# Patient Record
Sex: MALE | Race: WHITE | NOT HISPANIC OR LATINO | Employment: OTHER | URBAN - METROPOLITAN AREA
[De-identification: names, ages, dates, MRNs, and addresses within clinical notes are randomized per-mention and may not be internally consistent; named-entity substitution may affect disease eponyms.]

---

## 2021-05-25 ENCOUNTER — RECORDS - HEALTHEAST (OUTPATIENT)
Dept: LAB | Facility: CLINIC | Age: 81
End: 2021-05-25

## 2021-05-25 LAB
CHOLEST SERPL-MCNC: 146 MG/DL
FASTING STATUS PATIENT QL REPORTED: ABNORMAL
HDLC SERPL-MCNC: 38 MG/DL
LDLC SERPL CALC-MCNC: 77 MG/DL
TRIGL SERPL-MCNC: 157 MG/DL

## 2021-06-10 ENCOUNTER — RECORDS - HEALTHEAST (OUTPATIENT)
Dept: ADMINISTRATIVE | Facility: OTHER | Age: 81
End: 2021-06-10

## 2021-06-10 LAB
LAB AP CHARGES (HE HISTORICAL CONVERSION): NORMAL
PATH REPORT.COMMENTS IMP SPEC: NORMAL
PATH REPORT.FINAL DX SPEC: NORMAL
PATH REPORT.GROSS SPEC: NORMAL
PATH REPORT.MICROSCOPIC SPEC OTHER STN: NORMAL
PATH REPORT.RELEVANT HX SPEC: NORMAL
RESULT FLAG (HE HISTORICAL CONVERSION): NORMAL

## 2022-07-28 ENCOUNTER — LAB REQUISITION (OUTPATIENT)
Dept: LAB | Facility: CLINIC | Age: 82
End: 2022-07-28
Payer: MEDICARE

## 2022-07-28 DIAGNOSIS — U07.1 COVID-19: ICD-10-CM

## 2022-07-28 LAB
ANION GAP SERPL CALCULATED.3IONS-SCNC: 3 MMOL/L (ref 7–15)
BUN SERPL-MCNC: 16.9 MG/DL (ref 8–23)
CALCIUM SERPL-MCNC: 8.4 MG/DL (ref 8.8–10.2)
CHLORIDE SERPL-SCNC: 104 MMOL/L (ref 98–107)
CREAT SERPL-MCNC: 1.01 MG/DL (ref 0.67–1.17)
DEPRECATED HCO3 PLAS-SCNC: 28 MMOL/L (ref 22–29)
GFR SERPL CREATININE-BSD FRML MDRD: 74 ML/MIN/1.73M2
GLUCOSE SERPL-MCNC: 98 MG/DL (ref 70–99)
POTASSIUM SERPL-SCNC: 3.6 MMOL/L (ref 3.4–5.3)
SODIUM SERPL-SCNC: 135 MMOL/L (ref 136–145)

## 2022-07-28 PROCEDURE — 80048 BASIC METABOLIC PNL TOTAL CA: CPT | Mod: ORL | Performed by: FAMILY MEDICINE

## 2024-01-03 ENCOUNTER — LAB REQUISITION (OUTPATIENT)
Dept: LAB | Facility: CLINIC | Age: 84
End: 2024-01-03

## 2024-01-03 ENCOUNTER — TRANSFERRED RECORDS (OUTPATIENT)
Dept: HEALTH INFORMATION MANAGEMENT | Facility: CLINIC | Age: 84
End: 2024-01-03
Payer: COMMERCIAL

## 2024-01-03 DIAGNOSIS — Z00.00 ENCOUNTER FOR GENERAL ADULT MEDICAL EXAMINATION WITHOUT ABNORMAL FINDINGS: ICD-10-CM

## 2024-01-03 LAB
ALBUMIN SERPL BCG-MCNC: 3.9 G/DL (ref 3.5–5.2)
ALP SERPL-CCNC: 89 U/L (ref 40–150)
ALT SERPL W P-5'-P-CCNC: 11 U/L (ref 0–70)
ANION GAP SERPL CALCULATED.3IONS-SCNC: 8 MMOL/L (ref 7–15)
AST SERPL W P-5'-P-CCNC: 15 U/L (ref 0–45)
BILIRUB SERPL-MCNC: 1 MG/DL
BUN SERPL-MCNC: 13.6 MG/DL (ref 8–23)
CALCIUM SERPL-MCNC: 9.1 MG/DL (ref 8.8–10.2)
CHLORIDE SERPL-SCNC: 104 MMOL/L (ref 98–107)
CHOLEST SERPL-MCNC: 124 MG/DL
CREAT SERPL-MCNC: 1.08 MG/DL (ref 0.67–1.17)
DEPRECATED HCO3 PLAS-SCNC: 27 MMOL/L (ref 22–29)
EGFRCR SERPLBLD CKD-EPI 2021: 68 ML/MIN/1.73M2
FASTING STATUS PATIENT QL REPORTED: NORMAL
GLUCOSE SERPL-MCNC: 101 MG/DL (ref 70–99)
HDLC SERPL-MCNC: 44 MG/DL
LDLC SERPL CALC-MCNC: 67 MG/DL
NONHDLC SERPL-MCNC: 80 MG/DL
POTASSIUM SERPL-SCNC: 4.3 MMOL/L (ref 3.4–5.3)
PROT SERPL-MCNC: 6.8 G/DL (ref 6.4–8.3)
SODIUM SERPL-SCNC: 139 MMOL/L (ref 135–145)
TRIGL SERPL-MCNC: 66 MG/DL

## 2024-01-03 PROCEDURE — 80061 LIPID PANEL: CPT | Performed by: FAMILY MEDICINE

## 2024-01-03 PROCEDURE — 80053 COMPREHEN METABOLIC PANEL: CPT | Performed by: FAMILY MEDICINE

## 2024-01-08 ENCOUNTER — HOSPITAL ENCOUNTER (OUTPATIENT)
Dept: CARDIOLOGY | Facility: CLINIC | Age: 84
Discharge: HOME | End: 2024-01-08
Attending: FAMILY MEDICINE | Admitting: FAMILY MEDICINE
Payer: COMMERCIAL

## 2024-01-08 VITALS — HEART RATE: 110 BPM | SYSTOLIC BLOOD PRESSURE: 146 MMHG | DIASTOLIC BLOOD PRESSURE: 92 MMHG

## 2024-01-08 DIAGNOSIS — R06.09 DYSPNEA ON EXERTION: ICD-10-CM

## 2024-01-08 LAB — LVEF ECHO: NORMAL

## 2024-01-08 PROCEDURE — 93306 TTE W/DOPPLER COMPLETE: CPT | Mod: 26 | Performed by: INTERNAL MEDICINE

## 2024-01-08 PROCEDURE — 93306 TTE W/DOPPLER COMPLETE: CPT

## 2024-01-29 ENCOUNTER — TRANSFERRED RECORDS (OUTPATIENT)
Dept: HEALTH INFORMATION MANAGEMENT | Facility: CLINIC | Age: 84
End: 2024-01-29
Payer: COMMERCIAL

## 2024-01-29 ENCOUNTER — MEDICAL CORRESPONDENCE (OUTPATIENT)
Dept: HEALTH INFORMATION MANAGEMENT | Facility: CLINIC | Age: 84
End: 2024-01-29
Payer: COMMERCIAL

## 2024-03-03 ENCOUNTER — HEALTH MAINTENANCE LETTER (OUTPATIENT)
Age: 84
End: 2024-03-03

## 2024-03-06 ENCOUNTER — OFFICE VISIT (OUTPATIENT)
Dept: CARDIOLOGY | Facility: CLINIC | Age: 84
End: 2024-03-06
Payer: COMMERCIAL

## 2024-03-06 VITALS
DIASTOLIC BLOOD PRESSURE: 78 MMHG | HEART RATE: 68 BPM | HEIGHT: 72 IN | BODY MASS INDEX: 36.01 KG/M2 | SYSTOLIC BLOOD PRESSURE: 130 MMHG | RESPIRATION RATE: 16 BRPM | WEIGHT: 265.9 LBS | OXYGEN SATURATION: 98 %

## 2024-03-06 DIAGNOSIS — I50.21 ACUTE SYSTOLIC CONGESTIVE HEART FAILURE (H): ICD-10-CM

## 2024-03-06 DIAGNOSIS — R00.0 TACHYCARDIA INDUCED CARDIOMYOPATHY (H): ICD-10-CM

## 2024-03-06 DIAGNOSIS — E66.01 MORBID OBESITY (H): ICD-10-CM

## 2024-03-06 DIAGNOSIS — I43 TACHYCARDIA INDUCED CARDIOMYOPATHY (H): ICD-10-CM

## 2024-03-06 DIAGNOSIS — I48.19 PERSISTENT ATRIAL FIBRILLATION (H): Primary | ICD-10-CM

## 2024-03-06 PROBLEM — I42.8 TACHYCARDIA INDUCED CARDIOMYOPATHY (H): Status: ACTIVE | Noted: 2024-03-06

## 2024-03-06 PROCEDURE — 93000 ELECTROCARDIOGRAM COMPLETE: CPT | Performed by: INTERNAL MEDICINE

## 2024-03-06 PROCEDURE — 99417 PROLNG OP E/M EACH 15 MIN: CPT | Performed by: INTERNAL MEDICINE

## 2024-03-06 PROCEDURE — 99205 OFFICE O/P NEW HI 60 MIN: CPT | Performed by: INTERNAL MEDICINE

## 2024-03-06 RX ORDER — FUROSEMIDE 20 MG
10 TABLET ORAL DAILY
Qty: 30 TABLET | Refills: 11 | Status: SHIPPED | OUTPATIENT
Start: 2024-03-06 | End: 2024-05-08

## 2024-03-06 RX ORDER — SPIRONOLACTONE 25 MG/1
12.5 TABLET ORAL DAILY
Qty: 30 TABLET | Refills: 5 | Status: SHIPPED | OUTPATIENT
Start: 2024-03-06 | End: 2024-05-08

## 2024-03-06 RX ORDER — METOPROLOL SUCCINATE 25 MG/1
25 TABLET, EXTENDED RELEASE ORAL DAILY
Qty: 30 TABLET | Refills: 11 | Status: SHIPPED | OUTPATIENT
Start: 2024-03-06 | End: 2024-03-29

## 2024-03-06 RX ORDER — LISINOPRIL 5 MG/1
5 TABLET ORAL DAILY
Qty: 30 TABLET | Refills: 11 | Status: SHIPPED | OUTPATIENT
Start: 2024-03-06 | End: 2024-05-08

## 2024-03-06 NOTE — LETTER
3/6/2024    Ravin Dockery MD  [address] [place] [address]    RE: Ravin Swanson       Dear Colleague,     I had the pleasure of seeing Ravin Swanson in the [facility].      Click to link to [facility] HEART CARE NOTE    Thank you, Dr. Dockery, for asking me to see Ravin Castrot in consultation at [facility] to evaluate abnormal echo.      Assessment/Plan:   Newly diagnosed atrial fibrillation with RVR: Patient has no obvious palpitations.  He developed a shortness of breath on exertion since November after he had COVID infection.  The patient is overweight.  He never had a sleep study.  Most likely he has obstructive with sleep apnea.  His ECG showed atrial fibrillation with RVR.  Echocardiogram was reported normal left ventricular size with moderate LVH, LVEF 40 to 45%, global hypokinesis, moderate right ventricular systolic dysfunction, severely dilated left atrium.  Based on echo ECG findings, most likely the patient had atrial fibrillation for a while.  We discussed the evaluation and management.  After discussion, start metoprolol succinate 50 mg daily for rate control. His [id number] score 3 due to age, CHF.  We discussed anticoagulation.  After discussion Xarelto 20 mg is started at suppertime for prevention of stroke.  Cardiomyopathy, LVEF of 40 to 45%, acute systolic congestive heart failure: Most likely, his cardiomyopathy was induced by tachycardia.  Discussed the management of atrial fibrillation and CHF.  Started metoprolol succinate 50 mg daily as mentioned above, start lisinopril 5 mg daily, spironolactone 12.5 mg daily, start Lasix 10 mg daily.  Follow-up with heart failure clinic in 2 weeks with routine labs.  Morbid obesity: He has lost 20 pounds over last a year.  Continue lifestyle modification.  Refer the patient to sleep medicine for evaluation of obstructive sleep apnea.    The patient is scheduled to  follow-up with the heart failure clinic in 2 weeks to reevaluate the management of atrial fibrillation, congestive heart failure and have routine labs including BMP.      Total 75 minutes were spent in this visit for face to face visit, physical exam, review of current labs/imaging studies, plan for ongoing treatment with >50% spent on counseling and coordination of care as documented in the above mentioned note.      Thank you for the opportunity to be involved in the care of Ravin Swanson. If you have any questions, please feel free to contact me.  I will see the patient again in 3 months and as needed    Much or all of the text in this note was generated through the use of Dragon Dictate voice-to-text software. Errors in spelling or words which seem out of context are unintentional.   Sound alike errors, in particular, may have escaped editing.       History of Present Illness:   It is my pleasure to see Ravin Swanson at the [facility] clinic for evaluation of abnormal ECHO. Ravin Swanson is a 83 year old male with a medical history of morbid obesity, history of prostate cancer status post surgical treatment.    The patient states that he was doing well until November 2023.  She got the COVID infection at that time.  After COVID infection, he developed shortness of breath on exertion.  He developed very mild leg edema.  He denies chest pain, shortness of breath at rest.  No exertional chest pain.  He has occasional rapid heartbeat.  He had no dizziness, lightheadedness, orthopnea, PND.  He lost 20 pounds over last a year.  His blood pressure and heart rate in normal range this morning.    Twelve-lead ECG in clinic today showed    Echocardiogram on January 8, 2024 which was reported normal left ventricular size, moderate LVH, mild to moderate global hypokinesis with estimated left ventricle ejection fraction 40 to 45%, moderately decreased right ventricular systolic function, left atrium  severely enlarged, mildly elevated pulmonary artery systolic pressure.  Ascending aorta 4.0 cm.  The patient was in atrial fibrillation during echo scan.    Past Medical History:     Patient Active Problem List   Diagnosis    Herpes Zoster (Shingles)       Past Surgical History:   No past surgical history on file.    Family History:   Reviewed: His brother has some kind of heart disease.    Social History:    reports that he quit smoking about 38 years ago. His smoking use included cigarettes. He has never used smokeless tobacco. He reports current alcohol use. He reports that he does not use drugs.    Review of Systems:   12 systems are reviewed negative except for in HPI.    Meds:   No current outpatient medications on file.     Allergies:   Patient has no known allergies.    Objective:      Physical Exam  120.6 kg (265 lb 14.4 oz)  1.829 m (6')  Body mass index is 36.06 kg/m .  /78 (BP Location: Right arm, Patient Position: Sitting, Cuff Size: Adult Large)   Pulse 68   Resp 16   Ht 1.829 m (6')   Wt 120.6 kg (265 lb 14.4 oz)   SpO2 98%   BMI 36.06 kg/m      General Appearance:   Awake, Alert, No acute distress.   HEENT:  Pupil equal, reactive to light. No scleral icterus; the mucous membranes were moist. No oral ulcers or thrush.    Neck: No cervical bruits. No JVD. No thyromegaly. No lymph node enlargement or tenderness.   Chest: The spine was straight. The chest was symmetric.   Lungs:   Respirations unlabored. Lungs are clear to auscultation. No crackles. No wheezing.   Cardiovascular:   Irregularly irregular, tachycardiac, normal first and second heart sounds with no murmurs. No rubs or gallops.    Abdomen:  Obese. Soft. No tenderness. Non-distended. Bowels sounds are present   Extremities: Equal posterior tibial pulses. Trace leg edema.   Skin: No rashes or ulcers. Warm, Dry.   Musculoskeletal: No tenderness. No deformity.   Neurologic: Mood and affect are appropriate. No focal deficits.      "    EKG:  Personally reivewed  Atrial fibrillation with RVR  No previous ECG for comparison    Cardiac Imaging Studies  ECHO on 1-8-2024:  The left ventricle is normal in size.  There is moderate concentric left ventricular hypertrophy.  The visual ejection fraction is 40-45%.  There is mild-moderate global hypokinesia of the left ventricle.  The right ventricle is mildly dilated.  Moderately decreased right ventricular systolic function  The left atrium is severely dilated.  The right ventricular systolic pressure is approximated at 30mmHg plus the  right atrial pressure.  Ascending Aorta dilatation is present.  The rhythm was rapid atrial fibrillation.  There is no comparison study available.    Lab Review   Lab Results   Component Value Date     01/03/2024    CO2 27 01/03/2024    BUN 13.6 01/03/2024     No results found for: \"WBC\", \"HGB\", \"HCT\", \"MCV\", \"PLT\"  Lab Results   Component Value Date    CHOL 124 01/03/2024    TRIG 66 01/03/2024    HDL 44 01/03/2024    LDL 67 01/03/2024     LDL Cholesterol Calculated   Date Value Ref Range Status   01/03/2024 67 <=100 mg/dL Final             Thank you for allowing me to participate in the care of your patient.      Sincerely,     Hedy Landrum MD     [facility]  cc:   No referring provider defined for this encounter.      "

## 2024-03-06 NOTE — PATIENT INSTRUCTIONS
Ravin Swanson,    It is my pleasure to see you today at the [facility].    My recommendations for this visit are:    You have persistent atrial fibrillation, reduced heart systolic function and mild congestive heart failure.  Plan is:  Holter to evaluate atrial fibrillation   Start metoprolol XL 25 mg daily for atrial fibrillation.  Start Xarelto 20 mg at suppertime to prevent stroke.  Lisinopril 5 mg daily, spironolactone 12.5 mg daily and lasix 10 mg daily for treat congestive heart failure  Refer you to sleep medicine for evaluation of obstructive sleep apnea.  See nurse practitioner in 2 weeks for reevaluate your atrial fib and heart failure, also perform labs  Will see you again in 3 months        Hedy Landrum MD, PhD

## 2024-03-06 NOTE — PROGRESS NOTES
Click to link to [facility] HEART CARE NOTE    Thank you, Dr. Dockery, for asking me to see Ravin Swanson in consultation at [facility] to evaluate abnormal echo.      Assessment/Plan:   Newly diagnosed atrial fibrillation with RVR: Patient has no obvious palpitations.  He developed a shortness of breath on exertion since November after he had COVID infection.  The patient is overweight.  He never had a sleep study.  Most likely he has obstructive with sleep apnea.  His ECG showed atrial fibrillation with RVR.  Echocardiogram was reported normal left ventricular size with moderate LVH, LVEF 40 to 45%, global hypokinesis, moderate right ventricular systolic dysfunction, severely dilated left atrium.  Based on echo ECG findings, most likely the patient had atrial fibrillation for a while.  We discussed the evaluation and management.  After discussion, start metoprolol succinate 50 mg daily for rate control. His [id number] score 3 due to age, CHF.  We discussed anticoagulation.  After discussion Xarelto 20 mg is started at suppertime for prevention of stroke.  Cardiomyopathy, LVEF of 40 to 45%, acute systolic congestive heart failure: Most likely, his cardiomyopathy was induced by tachycardia.  Discussed the management of atrial fibrillation and CHF.  Started metoprolol succinate 50 mg daily as mentioned above, start lisinopril 5 mg daily, spironolactone 12.5 mg daily, start Lasix 10 mg daily.  Follow-up with heart failure clinic in 2 weeks with routine labs.  Morbid obesity: He has lost 20 pounds over last a year.  Continue lifestyle modification.  Refer the patient to sleep medicine for evaluation of obstructive sleep apnea.    The patient is scheduled to follow-up with the heart failure clinic in 2 weeks to reevaluate the management of atrial fibrillation, congestive heart failure and have routine labs including BMP.      Total 75 minutes were spent in this visit  for face to face visit, physical exam, review of current labs/imaging studies, plan for ongoing treatment with >50% spent on counseling and coordination of care as documented in the above mentioned note.      Thank you for the opportunity to be involved in the care of Ravin Swanson. If you have any questions, please feel free to contact me.  I will see the patient again in 3 months and as needed    Much or all of the text in this note was generated through the use of Dragon Dictate voice-to-text software. Errors in spelling or words which seem out of context are unintentional.   Sound alike errors, in particular, may have escaped editing.       History of Present Illness:   It is my pleasure to see Ravin Swanson at the [facility] clinic for evaluation of abnormal ECHO. Ravin Swanson is a 83 year old male with a medical history of morbid obesity, history of prostate cancer status post surgical treatment.    The patient states that he was doing well until November 2023.  She got the COVID infection at that time.  After COVID infection, he developed shortness of breath on exertion.  He developed very mild leg edema.  He denies chest pain, shortness of breath at rest.  No exertional chest pain.  He has occasional rapid heartbeat.  He had no dizziness, lightheadedness, orthopnea, PND.  He lost 20 pounds over last a year.  His blood pressure and heart rate in normal range this morning.    Twelve-lead ECG in clinic today showed    Echocardiogram on January 8, 2024 which was reported normal left ventricular size, moderate LVH, mild to moderate global hypokinesis with estimated left ventricle ejection fraction 40 to 45%, moderately decreased right ventricular systolic function, left atrium severely enlarged, mildly elevated pulmonary artery systolic pressure.  Ascending aorta 4.0 cm.  The patient was in atrial fibrillation during echo scan.    Past Medical History:     Patient Active Problem List    Diagnosis    Herpes Zoster (Shingles)       Past Surgical History:   No past surgical history on file.    Family History:   Reviewed: His brother has some kind of heart disease.    Social History:    reports that he quit smoking about 38 years ago. His smoking use included cigarettes. He has never used smokeless tobacco. He reports current alcohol use. He reports that he does not use drugs.    Review of Systems:   12 systems are reviewed negative except for in HPI.    Meds:   No current outpatient medications on file.     Allergies:   Patient has no known allergies.    Objective:      Physical Exam  120.6 kg (265 lb 14.4 oz)  1.829 m (6')  Body mass index is 36.06 kg/m .  /78 (BP Location: Right arm, Patient Position: Sitting, Cuff Size: Adult Large)   Pulse 68   Resp 16   Ht 1.829 m (6')   Wt 120.6 kg (265 lb 14.4 oz)   SpO2 98%   BMI 36.06 kg/m      General Appearance:   Awake, Alert, No acute distress.   HEENT:  Pupil equal, reactive to light. No scleral icterus; the mucous membranes were moist. No oral ulcers or thrush.    Neck: No cervical bruits. No JVD. No thyromegaly. No lymph node enlargement or tenderness.   Chest: The spine was straight. The chest was symmetric.   Lungs:   Respirations unlabored. Lungs are clear to auscultation. No crackles. No wheezing.   Cardiovascular:   Irregularly irregular, tachycardiac, normal first and second heart sounds with no murmurs. No rubs or gallops.    Abdomen:  Obese. Soft. No tenderness. Non-distended. Bowels sounds are present   Extremities: Equal posterior tibial pulses. Trace leg edema.   Skin: No rashes or ulcers. Warm, Dry.   Musculoskeletal: No tenderness. No deformity.   Neurologic: Mood and affect are appropriate. No focal deficits.         EKG:  Personally reivewed  Atrial fibrillation with RVR  No previous ECG for comparison    Cardiac Imaging Studies  ECHO on 1-8-2024:  The left ventricle is normal in size.  There is moderate concentric left  "ventricular hypertrophy.  The visual ejection fraction is 40-45%.  There is mild-moderate global hypokinesia of the left ventricle.  The right ventricle is mildly dilated.  Moderately decreased right ventricular systolic function  The left atrium is severely dilated.  The right ventricular systolic pressure is approximated at 30mmHg plus the  right atrial pressure.  Ascending Aorta dilatation is present.  The rhythm was rapid atrial fibrillation.  There is no comparison study available.    Lab Review   Lab Results   Component Value Date     01/03/2024    CO2 27 01/03/2024    BUN 13.6 01/03/2024     No results found for: \"WBC\", \"HGB\", \"HCT\", \"MCV\", \"PLT\"  Lab Results   Component Value Date    CHOL 124 01/03/2024    TRIG 66 01/03/2024    HDL 44 01/03/2024    LDL 67 01/03/2024     LDL Cholesterol Calculated   Date Value Ref Range Status   01/03/2024 67 <=100 mg/dL Final               "

## 2024-03-07 ENCOUNTER — HOSPITAL ENCOUNTER (OUTPATIENT)
Dept: CARDIOLOGY | Facility: CLINIC | Age: 84
Discharge: HOME | End: 2024-03-07
Attending: INTERNAL MEDICINE | Admitting: INTERNAL MEDICINE
Payer: COMMERCIAL

## 2024-03-07 DIAGNOSIS — I48.19 PERSISTENT ATRIAL FIBRILLATION (H): ICD-10-CM

## 2024-03-07 LAB
ATRIAL RATE - MUSE: 120 BPM
DIASTOLIC BLOOD PRESSURE - MUSE: NORMAL MMHG
INTERPRETATION ECG - MUSE: NORMAL
P AXIS - MUSE: NORMAL DEGREES
PR INTERVAL - MUSE: NORMAL MS
QRS DURATION - MUSE: 108 MS
QT - MUSE: 352 MS
QTC - MUSE: 469 MS
R AXIS - MUSE: -25 DEGREES
SYSTOLIC BLOOD PRESSURE - MUSE: NORMAL MMHG
T AXIS - MUSE: 8 DEGREES
VENTRICULAR RATE- MUSE: 107 BPM

## 2024-03-07 PROCEDURE — 93225 XTRNL ECG REC<48 HRS REC: CPT

## 2024-03-18 ENCOUNTER — TRANSFERRED RECORDS (OUTPATIENT)
Dept: HEALTH INFORMATION MANAGEMENT | Facility: CLINIC | Age: 84
End: 2024-03-18

## 2024-03-18 PROCEDURE — 93227 XTRNL ECG REC<48 HR R&I: CPT | Performed by: INTERNAL MEDICINE

## 2024-03-26 NOTE — PROGRESS NOTES
Assessment/Recommendations   Assessment:    1.  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  He reports his home weight was 268 pounds yesterday.    Noted crackles in right lower lobe.  Continues to have shortness of breath with exertion which is unchanged.  Denies shortness of breath at rest, PND orthopnea.      Heart failure regimen includes:    -Beta-blocker therapy with metoprolol succinate 25 mg daily    -ACE inhibitor with lisinopril 5 mg daily    -Aldosterone blocker therapy with spironolactone 12.5 mg daily    -Not on SGLT2 Inhibitor     -Diuretic therapy with furosemide 10 mg daily    We discussed and reviewed about heart failure, medication management, and lifestyle management including low sodium diet <2 g/day, daily weight, and staying physically active as tolerated. Patient met [facility] nurse clinician for heart failure education.    2.  Newly diagnosis atrial fibrillation with RVR: Heart rate today is 100.  Recent Holter study showed continuous A-fib with average heart rate at 100.  Reports drinking 5 to 6 cups of coffee a day.  Suspected more chronic based on echo result.  On metoprolol succinate for rate control.    On Xarelto for stroke prophylaxis.  He is asymptomatic except reports shortness of breath on exertion specially walking up incline    3.  Morbid obesity: Patient reported weight loss of 20 lbs in the last 1 year.    4.  Possible obstructive sleep apnea: Referred to sleep clinic for evaluation.    Plan/Recommendation:  -BMP/NT proBNP/magnesium pending.  -Will consider increasing metoprolol from 25 mg to 50 mg daily for cardiomyopathy and rate control.  -Low-sodium diet, daily weight, and adequate fluid intake per heart failure guideline  -Limit coffee to 2 cups a day.  - Follow up with sleep clinic as scheduled     Patient is anticipating colonoscopy in May.  He will contact Dr. Landrum's  "office regarding question for holding anticoagulation therapy.     Follow up with Dr. Landrum in July. Follow up with me in 4 weeks in HF clinic     The longitudinal plan of care for cardiomyopathy likely tachycardia mediated, acute systolic heart failure, atrial fibrillation with RVR, and morbid obesity was addressed during this visit.?Due to the added   complexity in care, I will continue to support Ravin Swanson in the subsequent management of this   condition(s) and with the ongoing continuity of care of this condition(s)\".     History of Present Illness/Subjective    Mr. Ravin Swanson is a 83 year old male with a past medical history of morbid obesity, COVID infection in November 2023, dyspnea on exertion, and recent diagnosis of tachycardia mediated cardiomyopathy, acute systolic heart failure, and persistent atrial fibrillation with RVR who is seen at [facility] for heart failure follow-up recommendation from Dr. Landrum.    Patient saw Dr. Landrum early this month for consultation.  Note reviewed.    Today, Ravin reports that he feels about the same since he saw Dr. Landrum.  He denies any adverse effect from heart failure medication that was started.  He continues to have shortness of breath with exertion especially walking up incline.  This is unchanged since he had a COVID infection in November 2023.He denies fatigue, lightheadedness, shortness of breath, orthopnea, PND, palpitations, chest pain, abdominal fullness/bloating, and lower extremity edema.  He denies any bleeding complications.    He is not on low-sodium diet.  He is not on fluid restriction.      ECHO from 1/8/24-Reviewed:   Interpretation Summary   The left ventricle is normal in size.  There is moderate concentric left ventricular hypertrophy.  The visual ejection fraction is 40-45%.  There is mild-moderate global hypokinesia of the left ventricle.  The right ventricle is mildly dilated.  Moderately decreased right " ventricular systolic function  The left atrium is severely dilated.  The right ventricular systolic pressure is approximated at 30mmHg plus the  right atrial pressure.  Ascending Aorta dilatation is present.  The rhythm was rapid atrial fibrillation.  There is no comparison study available.     Physical Examination Review of Systems   /67 (BP Location: Left arm, Patient Position: Sitting, Cuff Size: Adult Large)   Pulse 100   Resp 20   Ht 1.829 m (6')   Wt 118.9 kg (262 lb 1.6 oz)   BMI 35.55 kg/m    Body mass index is 35.55 kg/m .  Wt Readings from Last 3 Encounters:   03/27/24 118.9 kg (262 lb 1.6 oz)   03/06/24 120.6 kg (265 lb 14.4 oz)     General Appearance:   no distress, normal body habitus   ENT/Mouth: membranes moist, no oral lesions or bleeding gums.      EYES:  no scleral icterus, normal conjunctivae   Neck: no carotid bruits or thyromegaly   Chest/Lungs:   lungs are clear to auscultation, no rales or wheezing, equal chest wall expansion    Cardiovascular:   Irregularly irregular/tachycardic. Normal first and second heart sounds with no murmurs, rubs, or gallops; the carotid, radial and posterior tibial pulses are intact, JVP is difficult to assess due to the patient's obesity and body habitus   , no edema bilaterally    Abdomen:  no organomegaly, masses, bruits, or tenderness; bowel sounds are present   Extremities   no cyanosis or clubbing; CMS intact.   Skin: no xanthelasma, warm.    Neurologic: normal  bilateral, no tremors   Psychiatric: alert and oriented x3, calm                                                    Negative unless noted in HPI     Medical History  Surgical History Family History Social History   No past medical history on file. No past surgical history on file. No family history on file. Social History     Socioeconomic History    Marital status:      Spouse name: Not on file    Number of children: Not on file    Years of education: Not on file    Highest  "education level: Not on file   Occupational History    Not on file   Tobacco Use    Smoking status: Former     Types: Cigarettes     Quit date:      Years since quittin.2    Smokeless tobacco: Never   Vaping Use    Vaping Use: Never used   Substance and Sexual Activity    Alcohol use: Yes     Comment: minimal    Drug use: Never    Sexual activity: Not on file   Other Topics Concern    Not on file   Social History Narrative    Not on file     Social Determinants of Health     Financial Resource Strain: Not on file   Food Insecurity: Not on file   Transportation Needs: Not on file   Physical Activity: Not on file   Stress: Not on file   Social Connections: Not on file   Interpersonal Safety: Not on file   Housing Stability: Not on file          Medications  Allergies   Current Outpatient Medications   Medication Sig Dispense Refill    furosemide (LASIX) 20 MG tablet Take 0.5 tablets (10 mg) by mouth daily 30 tablet 11    lisinopril (ZESTRIL) 5 MG tablet Take 1 tablet (5 mg) by mouth daily 30 tablet 11    metoprolol succinate ER (TOPROL XL) 25 MG 24 hr tablet Take 1 tablet (25 mg) by mouth daily 30 tablet 11    rivaroxaban ANTICOAGULANT (XARELTO) 20 MG TABS tablet Take 1 tablet (20 mg) by mouth daily (with dinner) 30 tablet 11    spironolactone (ALDACTONE) 25 MG tablet Take 0.5 tablets (12.5 mg) by mouth daily 30 tablet 5    XHANCE 93 MCG/ACT nasal spray Spray into both nostrils 2 times daily      No Known Allergies      Lab Results    Chemistry/lipid CBC Cardiac Enzymes/BNP/TSH/INR   Lab Results   Component Value Date    CHOL 124 2024    HDL 44 2024    TRIG 66 2024    BUN 13.6 2024     2024    CO2 27 2024    No results found for: \"WBC\", \"HGB\", \"HCT\", \"MCV\", \"PLT\" No results found for: \"CKTOTAL\", \"CKMB\", \"TROPONINI\", \"BNP\", \"TSH\", \"INR\"     40  minutes spent on the date of encounter doing chart review, review of test results, interpretation with above tests, patient " visit, and documentation.        This note has been dictated using voice recognition software. Any grammatical, typographical, or context distortions are unintentional and inherent to the software

## 2024-03-27 ENCOUNTER — OFFICE VISIT (OUTPATIENT)
Dept: CARDIOLOGY | Facility: CLINIC | Age: 84
End: 2024-03-27
Attending: INTERNAL MEDICINE
Payer: COMMERCIAL

## 2024-03-27 VITALS
RESPIRATION RATE: 20 BRPM | SYSTOLIC BLOOD PRESSURE: 101 MMHG | DIASTOLIC BLOOD PRESSURE: 67 MMHG | HEART RATE: 100 BPM | BODY MASS INDEX: 35.5 KG/M2 | WEIGHT: 262.1 LBS | HEIGHT: 72 IN

## 2024-03-27 DIAGNOSIS — R00.0 TACHYCARDIA INDUCED CARDIOMYOPATHY (H): Primary | ICD-10-CM

## 2024-03-27 DIAGNOSIS — I50.21 ACUTE SYSTOLIC CONGESTIVE HEART FAILURE (H): ICD-10-CM

## 2024-03-27 DIAGNOSIS — G47.33 OSA (OBSTRUCTIVE SLEEP APNEA): ICD-10-CM

## 2024-03-27 DIAGNOSIS — E66.01 MORBID OBESITY (H): ICD-10-CM

## 2024-03-27 DIAGNOSIS — I48.19 PERSISTENT ATRIAL FIBRILLATION (H): ICD-10-CM

## 2024-03-27 DIAGNOSIS — I43 TACHYCARDIA INDUCED CARDIOMYOPATHY (H): Primary | ICD-10-CM

## 2024-03-27 LAB
ANION GAP SERPL CALCULATED.3IONS-SCNC: 9 MMOL/L (ref 7–15)
BUN SERPL-MCNC: 17.5 MG/DL (ref 8–23)
CALCIUM SERPL-MCNC: 9.7 MG/DL (ref 8.8–10.2)
CHLORIDE SERPL-SCNC: 100 MMOL/L (ref 98–107)
CREAT SERPL-MCNC: 1.06 MG/DL (ref 0.67–1.17)
DEPRECATED HCO3 PLAS-SCNC: 30 MMOL/L (ref 22–29)
EGFRCR SERPLBLD CKD-EPI 2021: 70 ML/MIN/1.73M2
GLUCOSE SERPL-MCNC: 81 MG/DL (ref 70–99)
MAGNESIUM SERPL-MCNC: 2.3 MG/DL (ref 1.7–2.3)
NT-PROBNP SERPL-MCNC: 742 PG/ML (ref 0–1800)
POTASSIUM SERPL-SCNC: 4.1 MMOL/L (ref 3.4–5.3)
SODIUM SERPL-SCNC: 139 MMOL/L (ref 135–145)

## 2024-03-27 PROCEDURE — 36415 COLL VENOUS BLD VENIPUNCTURE: CPT | Performed by: NURSE PRACTITIONER

## 2024-03-27 PROCEDURE — 99215 OFFICE O/P EST HI 40 MIN: CPT | Performed by: NURSE PRACTITIONER

## 2024-03-27 PROCEDURE — 80048 BASIC METABOLIC PNL TOTAL CA: CPT | Performed by: NURSE PRACTITIONER

## 2024-03-27 PROCEDURE — 83735 ASSAY OF MAGNESIUM: CPT | Performed by: NURSE PRACTITIONER

## 2024-03-27 PROCEDURE — G2211 COMPLEX E/M VISIT ADD ON: HCPCS | Performed by: NURSE PRACTITIONER

## 2024-03-27 PROCEDURE — 83880 ASSAY OF NATRIURETIC PEPTIDE: CPT | Performed by: NURSE PRACTITIONER

## 2024-03-27 RX ORDER — METOPROLOL SUCCINATE 25 MG/1
25 TABLET, EXTENDED RELEASE ORAL DAILY
Qty: 30 TABLET | Refills: 11 | Status: CANCELLED | OUTPATIENT
Start: 2024-03-27

## 2024-03-27 RX ORDER — LISINOPRIL 5 MG/1
5 TABLET ORAL DAILY
Qty: 30 TABLET | Refills: 11 | Status: CANCELLED | OUTPATIENT
Start: 2024-03-27

## 2024-03-27 RX ORDER — FLUTICASONE PROPIONATE 93 UG/1
SPRAY, METERED NASAL 2 TIMES DAILY
COMMUNITY
Start: 2024-03-18

## 2024-03-27 NOTE — LETTER
3/27/2024    Ravin Dockery MD  [address] [place] [address]    RE: Ravin Swanson       Dear Colleague,     I had the pleasure of seeing Ravin Swanson in the [facility].          Assessment/Recommendations   Assessment:    1.  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  He reports his home weight was 268 pounds yesterday.    Noted crackles in right lower lobe.  Continues to have shortness of breath with exertion which is unchanged.  Denies shortness of breath at rest, PND orthopnea.      Heart failure regimen includes:    -Beta-blocker therapy with metoprolol succinate 25 mg daily    -ACE inhibitor with lisinopril 5 mg daily    -Aldosterone blocker therapy with spironolactone 12.5 mg daily    -Not on SGLT2 Inhibitor     -Diuretic therapy with furosemide 10 mg daily    We discussed and reviewed about heart failure, medication management, and lifestyle management including low sodium diet <2 g/day, daily weight, and staying physically active as tolerated. Patient met Ma[facility] nurse clinician for heart failure education.    2.  Newly diagnosis atrial fibrillation with RVR: Heart rate today is 100.  Recent Holter study showed continuous A-fib with average heart rate at 100.  Reports drinking 5 to 6 cups of coffee a day.  Suspected more chronic based on echo result.  On metoprolol succinate for rate control.    On Xarelto for stroke prophylaxis.  He is asymptomatic except reports shortness of breath on exertion specially walking up incline    3.  Morbid obesity: Patient reported weight loss of 20 years in the last 1 year.    4.  Possible obstructive sleep apnea: Referred to sleep clinic for evaluation.    Plan/Recommendation:  -BMP/NT proBNP/magnesium pending.  -Will consider increasing metoprolol from 25 mg to 50 mg daily for cardiomyopathy and rate control.  -Low-sodium diet, daily  "weight, and adequate fluid intake per heart failure guideline  -Limit coffee to 2 cups a day.  - Follow up with sleep clinic as scheduled     Patient is anticipating colonoscopy in May.  He will contact Dr. Landrum's office regarding question for holding anticoagulation therapy.     Follow up with Dr. Landrum in July. Follow up with me in 4 weeks in HF clinic     The longitudinal plan of care for cardiomyopathy likely tachycardia mediated, acute systolic heart failure, atrial fibrillation with RVR, and morbid obesity was addressed during this visit.?Due to the added   complexity in care, I will continue to support Ravin Swanson in the subsequent management of this   condition(s) and with the ongoing continuity of care of this condition(s)\".     History of Present Illness/Subjective    Mr. Ravin Swanson is a 83 year old male with a past medical history of morbid obesity, COVID infection in November 2023, dyspnea on exertion, and recent diagnosis of tachycardia mediated cardiomyopathy, acute systolic heart failure, and persistent atrial fibrillation with RVR who is seen at [facility] for heart failure follow-up recommendation from Dr. Landrum.    Patient saw Dr Landrum early this month for consultation.  Note reviewed.    Today, Ravin reports that he feels about the same since he saw Dr. Landrum.  He denies any adverse effect from heart failure medication that was started.  He continues to have shortness of breath with exertion especially walking up incline.  This is unchanged since he had a COVID infection in November 2023.He denies fatigue, lightheadedness, shortness of breath, orthopnea, PND, palpitations, chest pain, abdominal fullness/bloating, and lower extremity edema.  He denies any bleeding complications.    He is not on low-sodium diet.  He is not on fluid restriction.      ECHO from 1/8/24-Reviewed:   Interpretation Summary   The left ventricle is normal in size.  There is moderate " concentric left ventricular hypertrophy.  The visual ejection fraction is 40-45%.  There is mild-moderate global hypokinesia of the left ventricle.  The right ventricle is mildly dilated.  Moderately decreased right ventricular systolic function  The left atrium is severely dilated.  The right ventricular systolic pressure is approximated at 30mmHg plus the  right atrial pressure.  Ascending Aorta dilatation is present.  The rhythm was rapid atrial fibrillation.  There is no comparison study available.     Physical Examination Review of Systems   /67 (BP Location: Left arm, Patient Position: Sitting, Cuff Size: Adult Large)   Pulse 100   Resp 20   Ht 1.829 m (6')   Wt 118.9 kg (262 lb 1.6 oz)   BMI 35.55 kg/m    Body mass index is 35.55 kg/m .  Wt Readings from Last 3 Encounters:   03/27/24 118.9 kg (262 lb 1.6 oz)   03/06/24 120.6 kg (265 lb 14.4 oz)     General Appearance:   no distress, normal body habitus   ENT/Mouth: membranes moist, no oral lesions or bleeding gums.      EYES:  no scleral icterus, normal conjunctivae   Neck: no carotid bruits or thyromegaly   Chest/Lungs:   lungs are clear to auscultation, no rales or wheezing, equal chest wall expansion    Cardiovascular:   Irregularly irregular/tachycardic. Normal first and second heart sounds with no murmurs, rubs, or gallops; the carotid, radial and posterior tibial pulses are intact, JVP is difficult to assess due to the patient's obesity and body habitus   , no edema bilaterally    Abdomen:  no organomegaly, masses, bruits, or tenderness; bowel sounds are present   Extremities   no cyanosis or clubbing; CMS intact.   Skin: no xanthelasma, warm.    Neurologic: normal  bilateral, no tremors   Psychiatric: alert and oriented x3, calm                                                    Negative unless noted in HPI     Medical History  Surgical History Family History Social History   No past medical history on file. No past surgical history on  "file. No family history on file. Social History     Socioeconomic History    Marital status:      Spouse name: Not on file    Number of children: Not on file    Years of education: Not on file    Highest education level: Not on file   Occupational History    Not on file   Tobacco Use    Smoking status: Former     Types: Cigarettes     Quit date:      Years since quittin.2    Smokeless tobacco: Never   Vaping Use    Vaping Use: Never used   Substance and Sexual Activity    Alcohol use: Yes     Comment: minimal    Drug use: Never    Sexual activity: Not on file   Other Topics Concern    Not on file   Social History Narrative    Not on file     Social Determinants of Health     Financial Resource Strain: Not on file   Food Insecurity: Not on file   Transportation Needs: Not on file   Physical Activity: Not on file   Stress: Not on file   Social Connections: Not on file   Interpersonal Safety: Not on file   Housing Stability: Not on file          Medications  Allergies   Current Outpatient Medications   Medication Sig Dispense Refill    furosemide (LASIX) 20 MG tablet Take 0.5 tablets (10 mg) by mouth daily 30 tablet 11    lisinopril (ZESTRIL) 5 MG tablet Take 1 tablet (5 mg) by mouth daily 30 tablet 11    metoprolol succinate ER (TOPROL XL) 25 MG 24 hr tablet Take 1 tablet (25 mg) by mouth daily 30 tablet 11    rivaroxaban ANTICOAGULANT (XARELTO) 20 MG TABS tablet Take 1 tablet (20 mg) by mouth daily (with dinner) 30 tablet 11    spironolactone (ALDACTONE) 25 MG tablet Take 0.5 tablets (12.5 mg) by mouth daily 30 tablet 5    XHANCE 93 MCG/ACT nasal spray Spray into both nostrils 2 times daily      No Known Allergies      Lab Results    Chemistry/lipid CBC Cardiac Enzymes/BNP/TSH/INR   Lab Results   Component Value Date    CHOL 124 2024    HDL 44 2024    TRIG 66 2024    BUN 13.6 2024     2024    CO2 27 2024    No results found for: \"WBC\", \"HGB\", \"HCT\", \"MCV\", " "\"PLT\" No results found for: \"CKTOTAL\", \"CKMB\", \"TROPONINI\", \"BNP\", \"TSH\", \"INR\"     40  minutes spent on the date of encounter doing chart review, review of test results, interpretation with above tests, patient visit, and documentation.        This note has been dictated using voice recognition software. Any grammatical, typographical, or context distortions are unintentional and inherent to the software          [facility]: Heart [facility] Care Coordination   Heart Failure Education    Situation/Background:      RN CC provided heart failure education to pt during clinic visit.    Assessment:      Living situation: home with family (son & dtr in law live with him)    Barriers to Heart Failure follow-up: none identified at this time     Medication management: independent    Pt has a scale at home, but doesn't weigh himself every day d/t the scale being in the bathroom that his son & dtr in law use in the morning when they are all getting ready. Discussed possibly bringing the scale to a hard, flat surface in the house where he could more easily access it. Also reviewed doing wt consistently w/ similar clothing, similar timing, & writing it down in the wt log to track and bring to clinic visits for provider review. Pt verbalized understanding. Sodium and fluid restrictions are new to pt, reviewed HF recommendations and rationale. Pt eats fairly small meals for breakfast and lunch, his dtr in law cooks dinner meals. Encouraged pt to share low sodium resources w/ her & start reviewing at nutrition labels.     Intervention/Plan:      CM/HF education topics reviewed:  Low sodium: 2000 mg or less daily, meal choices and label reading   Fluid Restriction: 50-60oz daily   Daily weight monitoring and logging   Overview of C.O.R.E. clinic   Overview of heart failure appointments and testing   Symptoms of HF to be reported to Core Team      Education materials provided:  Low sodium food and drink handout  Low sodium food " product examples  HF stoplight tool  C.O.R.E. information brochure     HF resources reviewed:  HRS      Patient to follow up as scheduled.  RN CC reviewed and reinforced fluid/dietary sodium restrictions; patient stated understanding.  Instructed patient to call RN line with new or worsening heart failure symptoms and/or rapid weight gain.     Patient expressed understanding of above education/instructions and denied further questions at this time.     Mauro Garcia C.O.R.E RN     Thank you for allowing me to participate in the care of your patient.      Sincerely,     DARLENE Vaughn [facility]  cc:   Hedy Landrum MD  [address]  [place] [address]

## 2024-03-27 NOTE — PROGRESS NOTES
[facility]: Heart Failure Care Coordination   Heart Failure Education    Situation/Background:      RN CC provided heart failure education to pt during clinic visit.    Assessment:      Living situation: home with family (son & dtr in law live with him)    Barriers to Heart Failure follow-up: none identified at this time     Medication management: independent    Pt has a scale at home, but doesn't weigh himself every day d/t the scale being in the bathroom that his son & dtr in law use in the morning when they are all getting ready. Discussed possibly bringing the scale to a hard, flat surface in the house where he could more easily access it. Also reviewed doing wt consistently w/ similar clothing, similar timing, & writing it down in the wt log to track and bring to clinic visits for provider review. Pt verbalized understanding. Sodium and fluid restrictions are new to pt, reviewed HF recommendations and rationale. Pt eats fairly small meals for breakfast and lunch, his dtr in law cooks dinner meals. Encouraged pt to share low sodium resources w/ her & start reviewing at nutrition labels.     Intervention/Plan:      CM/HF education topics reviewed:  Low sodium: 2000 mg or less daily, meal choices and label reading   Fluid Restriction: 50-60oz daily   Daily weight monitoring and logging   Overview of C.O.R.E. clinic   Overview of heart failure appointments and testing   Symptoms of HF to be reported to Core Team      Education materials provided:  Low sodium food and drink handout  Low sodium food product examples  HF stoplight tool  C.O.R.E. information brochure     HF resources reviewed:  HRS      Patient to follow up as scheduled.  RN CC reviewed and reinforced fluid/dietary sodium restrictions; patient stated understanding.  Instructed patient to call RN line with new or worsening heart failure symptoms and/or rapid weight gain.     Patient expressed understanding of above education/instructions and  denied further questions at this time.     SATNAM Acevedo RN

## 2024-03-27 NOTE — PATIENT INSTRUCTIONS
Ravin Swanson,    It was a pleasure to see you today at the [facility].     My recommendations after this visit include:    - No medications changes made today    - We will follow up with your lab result    -Limit caffeine intake to 2 cups a day    - Follow up with Shalom in 4 weeks    - Please call Heart Failure Nurse Line at [phone], if you have any questions or concerns    Shalom Chi CNP       What is the [facility] Failure [facility]?     The [facility] Failure Program is aheart failure specialty clinic within [facility].  You will work with your cardiologist, nurse practitioner, and nurses to carefully adjust medications and learn how to live with heart failure.  The Heart FailureProgram will help you:    Better understand your chronic heart condition  Feel better and avoid hospital stays    Monitoring for Symptoms      Call the Heart Failure Phone Line ([phone]) if you have any of these symptoms:   Increased shortness of breath/shortness ofbreath at rest  Waking up at night with difficulty breathing  Unable to lie down for sleep due to symptoms or needing to sit uprightfor sleep  Weight gain of 2 pounds a day for 2 days in a row OR 5 pounds in 1 week  Increased swelling in your ankles or legs  Dizziness or lightheadedness    Medications     Take your medications as prescribed  Bring all your medications in their original bottles to every appointment  Avoid non-steroidal anti-inflammatory medications (Advil,Aleve, Ibuprofen, Naprosyn, Naproxen, Celebrex)  Do not stop taking your medications or begin taking over-the-counter or herbal medications without first talking to your doctor ornurse practitioner    Diet and Lifestyle     Limit sodium/salt to 2000 mg daily   Read food labels for sodium content  Do not add salt when cooking or add salt at the table  Weigh yourself every day and record in your daily weight log   Call if you gain 2 pounds a day for 2  days in a row OR 5 pounds in 1 week  Bring daily weight log to every appointment  Stay active, pace yourself, listen to yourbody, and rest when tired  Elevate your legs if they are swollen. Ask about using compression/support stockings  Stop smoking  Lose weight if you are overweight  Avoid drinking alcohol or limit amount  Stay updated on your immunizations including flu and pneumonia vaccines

## 2024-03-29 ENCOUNTER — TELEPHONE (OUTPATIENT)
Dept: CARDIOLOGY | Facility: CLINIC | Age: 84
End: 2024-03-29
Payer: COMMERCIAL

## 2024-03-29 DIAGNOSIS — I50.21 ACUTE SYSTOLIC CONGESTIVE HEART FAILURE (H): ICD-10-CM

## 2024-03-29 DIAGNOSIS — I48.19 PERSISTENT ATRIAL FIBRILLATION (H): ICD-10-CM

## 2024-03-29 RX ORDER — METOPROLOL SUCCINATE 50 MG/1
50 TABLET, EXTENDED RELEASE ORAL DAILY
Qty: 90 TABLET | Refills: 3 | Status: SHIPPED | OUTPATIENT
Start: 2024-03-29

## 2024-03-29 NOTE — TELEPHONE ENCOUNTER
*Note below copied from lab results notes from 3/27/24.     Noted. Updated pt w/ recommendations from DARLENE Vaughn CNP. He verbalized understanding. Pt has [facility] phone number and will call w/ lightheadedness, dizziness, increased fatigue, wt gain, HF symptoms. Will send updated script for metoprolol succinate 50mg daily.    SATNAM Acevedo RN

## 2024-03-29 NOTE — TELEPHONE ENCOUNTER
----- Message from DARLENE Vaughn CNP sent at 3/29/2024 10:31 AM CDT -----  Please increase his metoprolol succinate from 25 mg to 50 mg daily.  Call us with any side effects or persistent weight gain with worsening HF symptoms.  Thank you!  CY

## 2024-04-26 ENCOUNTER — LAB REQUISITION (OUTPATIENT)
Dept: LAB | Facility: CLINIC | Age: 84
End: 2024-04-26

## 2024-04-26 ENCOUNTER — TRANSFERRED RECORDS (OUTPATIENT)
Dept: HEALTH INFORMATION MANAGEMENT | Facility: CLINIC | Age: 84
End: 2024-04-26
Payer: COMMERCIAL

## 2024-04-26 PROCEDURE — 80048 BASIC METABOLIC PNL TOTAL CA: CPT | Performed by: FAMILY MEDICINE

## 2024-04-27 LAB
ANION GAP SERPL CALCULATED.3IONS-SCNC: 11 MMOL/L (ref 7–15)
BUN SERPL-MCNC: 15.4 MG/DL (ref 8–23)
CALCIUM SERPL-MCNC: 8.8 MG/DL (ref 8.8–10.2)
CHLORIDE SERPL-SCNC: 103 MMOL/L (ref 98–107)
CREAT SERPL-MCNC: 1.07 MG/DL (ref 0.67–1.17)
DEPRECATED HCO3 PLAS-SCNC: 27 MMOL/L (ref 22–29)
EGFRCR SERPLBLD CKD-EPI 2021: 69 ML/MIN/1.73M2
GLUCOSE SERPL-MCNC: 73 MG/DL (ref 70–99)
POTASSIUM SERPL-SCNC: 4.3 MMOL/L (ref 3.4–5.3)
SODIUM SERPL-SCNC: 141 MMOL/L (ref 135–145)

## 2024-05-02 NOTE — OR NURSING
Pt stated that he called [facility] yesterday and was told my arrival time and procedure time were different. He appeared very upset and frustrated asking why it would change and that he made plans and now everything is different. He was reminded that [facility] doesn't give times until the day before and an apology was given.

## 2024-05-03 ENCOUNTER — ANESTHESIA EVENT (OUTPATIENT)
Dept: SURGERY | Facility: CLINIC | Age: 84
End: 2024-05-03
Payer: COMMERCIAL

## 2024-05-03 ENCOUNTER — HOSPITAL ENCOUNTER (OUTPATIENT)
Facility: CLINIC | Age: 84
Discharge: HOME | End: 2024-05-03
Attending: INTERNAL MEDICINE | Admitting: INTERNAL MEDICINE
Payer: COMMERCIAL

## 2024-05-03 ENCOUNTER — ANESTHESIA (OUTPATIENT)
Dept: SURGERY | Facility: CLINIC | Age: 84
End: 2024-05-03
Payer: COMMERCIAL

## 2024-05-03 VITALS
HEIGHT: 72 IN | TEMPERATURE: 97.3 F | RESPIRATION RATE: 16 BRPM | WEIGHT: 259 LBS | HEART RATE: 98 BPM | DIASTOLIC BLOOD PRESSURE: 83 MMHG | BODY MASS INDEX: 35.08 KG/M2 | OXYGEN SATURATION: 96 % | SYSTOLIC BLOOD PRESSURE: 134 MMHG

## 2024-05-03 LAB — COLONOSCOPY: NORMAL

## 2024-05-03 PROCEDURE — 370N000017 HC ANESTHESIA TECHNICAL FEE, PER MIN: Performed by: INTERNAL MEDICINE

## 2024-05-03 PROCEDURE — 710N000012 HC RECOVERY PHASE 2, PER MINUTE: Performed by: INTERNAL MEDICINE

## 2024-05-03 PROCEDURE — 360N000075 HC SURGERY LEVEL 2, PER MIN: Performed by: INTERNAL MEDICINE

## 2024-05-03 PROCEDURE — 258N000003 HC RX IP 258 OP 636: Performed by: ANESTHESIOLOGY

## 2024-05-03 PROCEDURE — 88305 TISSUE EXAM BY PATHOLOGIST: CPT | Mod: TC | Performed by: INTERNAL MEDICINE

## 2024-05-03 PROCEDURE — 272N000001 HC OR GENERAL SUPPLY STERILE: Performed by: INTERNAL MEDICINE

## 2024-05-03 PROCEDURE — 250N000009 HC RX 250: Performed by: NURSE ANESTHETIST, CERTIFIED REGISTERED

## 2024-05-03 PROCEDURE — 999N000141 HC STATISTIC PRE-PROCEDURE NURSING ASSESSMENT: Performed by: INTERNAL MEDICINE

## 2024-05-03 PROCEDURE — 250N000011 HC RX IP 250 OP 636: Performed by: NURSE ANESTHETIST, CERTIFIED REGISTERED

## 2024-05-03 RX ORDER — ONDANSETRON 4 MG/1
4 TABLET, ORALLY DISINTEGRATING ORAL EVERY 6 HOURS PRN
Status: DISCONTINUED | OUTPATIENT
Start: 2024-05-03 | End: 2024-05-03 | Stop reason: HOSPADM

## 2024-05-03 RX ORDER — LIDOCAINE HYDROCHLORIDE 10 MG/ML
INJECTION, SOLUTION INFILTRATION; PERINEURAL PRN
Status: DISCONTINUED | OUTPATIENT
Start: 2024-05-03 | End: 2024-05-03

## 2024-05-03 RX ORDER — NALOXONE HYDROCHLORIDE 0.4 MG/ML
0.4 INJECTION, SOLUTION INTRAMUSCULAR; INTRAVENOUS; SUBCUTANEOUS
Status: DISCONTINUED | OUTPATIENT
Start: 2024-05-03 | End: 2024-05-03 | Stop reason: HOSPADM

## 2024-05-03 RX ORDER — PROCHLORPERAZINE MALEATE 5 MG
5 TABLET ORAL EVERY 6 HOURS PRN
Status: DISCONTINUED | OUTPATIENT
Start: 2024-05-03 | End: 2024-05-03 | Stop reason: HOSPADM

## 2024-05-03 RX ORDER — LIDOCAINE 40 MG/G
CREAM TOPICAL
Status: DISCONTINUED | OUTPATIENT
Start: 2024-05-03 | End: 2024-05-03 | Stop reason: HOSPADM

## 2024-05-03 RX ORDER — NALOXONE HYDROCHLORIDE 0.4 MG/ML
0.2 INJECTION, SOLUTION INTRAMUSCULAR; INTRAVENOUS; SUBCUTANEOUS
Status: DISCONTINUED | OUTPATIENT
Start: 2024-05-03 | End: 2024-05-03 | Stop reason: HOSPADM

## 2024-05-03 RX ORDER — OXYCODONE HYDROCHLORIDE 5 MG/1
5 TABLET ORAL
Status: DISCONTINUED | OUTPATIENT
Start: 2024-05-03 | End: 2024-05-03 | Stop reason: HOSPADM

## 2024-05-03 RX ORDER — NALOXONE HYDROCHLORIDE 0.4 MG/ML
0.1 INJECTION, SOLUTION INTRAMUSCULAR; INTRAVENOUS; SUBCUTANEOUS
Status: DISCONTINUED | OUTPATIENT
Start: 2024-05-03 | End: 2024-05-03 | Stop reason: HOSPADM

## 2024-05-03 RX ORDER — DEXAMETHASONE SODIUM PHOSPHATE 10 MG/ML
4 INJECTION, SOLUTION INTRAMUSCULAR; INTRAVENOUS
Status: DISCONTINUED | OUTPATIENT
Start: 2024-05-03 | End: 2024-05-03 | Stop reason: HOSPADM

## 2024-05-03 RX ORDER — ONDANSETRON 2 MG/ML
4 INJECTION INTRAMUSCULAR; INTRAVENOUS EVERY 6 HOURS PRN
Status: DISCONTINUED | OUTPATIENT
Start: 2024-05-03 | End: 2024-05-03 | Stop reason: HOSPADM

## 2024-05-03 RX ORDER — OXYCODONE HYDROCHLORIDE 5 MG/1
10 TABLET ORAL
Status: DISCONTINUED | OUTPATIENT
Start: 2024-05-03 | End: 2024-05-03 | Stop reason: HOSPADM

## 2024-05-03 RX ORDER — ONDANSETRON 2 MG/ML
4 INJECTION INTRAMUSCULAR; INTRAVENOUS EVERY 30 MIN PRN
Status: DISCONTINUED | OUTPATIENT
Start: 2024-05-03 | End: 2024-05-03 | Stop reason: HOSPADM

## 2024-05-03 RX ORDER — SODIUM CHLORIDE, SODIUM LACTATE, POTASSIUM CHLORIDE, CALCIUM CHLORIDE 600; 310; 30; 20 MG/100ML; MG/100ML; MG/100ML; MG/100ML
INJECTION, SOLUTION INTRAVENOUS CONTINUOUS
Status: DISCONTINUED | OUTPATIENT
Start: 2024-05-03 | End: 2024-05-03 | Stop reason: HOSPADM

## 2024-05-03 RX ORDER — ONDANSETRON 4 MG/1
4 TABLET, ORALLY DISINTEGRATING ORAL EVERY 30 MIN PRN
Status: DISCONTINUED | OUTPATIENT
Start: 2024-05-03 | End: 2024-05-03 | Stop reason: HOSPADM

## 2024-05-03 RX ORDER — FLUMAZENIL 0.1 MG/ML
0.2 INJECTION, SOLUTION INTRAVENOUS
Status: DISCONTINUED | OUTPATIENT
Start: 2024-05-03 | End: 2024-05-03 | Stop reason: HOSPADM

## 2024-05-03 RX ORDER — PROPOFOL 10 MG/ML
INJECTION, EMULSION INTRAVENOUS PRN
Status: DISCONTINUED | OUTPATIENT
Start: 2024-05-03 | End: 2024-05-03

## 2024-05-03 RX ORDER — ONDANSETRON 2 MG/ML
4 INJECTION INTRAMUSCULAR; INTRAVENOUS
Status: DISCONTINUED | OUTPATIENT
Start: 2024-05-03 | End: 2024-05-03 | Stop reason: HOSPADM

## 2024-05-03 RX ORDER — PROPOFOL 10 MG/ML
INJECTION, EMULSION INTRAVENOUS CONTINUOUS PRN
Status: DISCONTINUED | OUTPATIENT
Start: 2024-05-03 | End: 2024-05-03

## 2024-05-03 RX ADMIN — PROPOFOL 20 MG: 10 INJECTION, EMULSION INTRAVENOUS at 08:08

## 2024-05-03 RX ADMIN — LIDOCAINE HYDROCHLORIDE 5 ML: 10 INJECTION, SOLUTION INFILTRATION; PERINEURAL at 08:05

## 2024-05-03 RX ADMIN — PROPOFOL 40 MG: 10 INJECTION, EMULSION INTRAVENOUS at 08:06

## 2024-05-03 RX ADMIN — PROPOFOL 150 MCG/KG/MIN: 10 INJECTION, EMULSION INTRAVENOUS at 08:05

## 2024-05-03 RX ADMIN — SODIUM CHLORIDE, POTASSIUM CHLORIDE, SODIUM LACTATE AND CALCIUM CHLORIDE: 600; 310; 30; 20 INJECTION, SOLUTION INTRAVENOUS at 07:22

## 2024-05-03 ASSESSMENT — ACTIVITIES OF DAILY LIVING (ADL)
ADLS_ACUITY_SCORE: 35

## 2024-05-03 ASSESSMENT — ENCOUNTER SYMPTOMS: DYSRHYTHMIAS: 1

## 2024-05-03 NOTE — INTERVAL H&P NOTE
I have reviewed the surgical (or preoperative) H&P that is linked to this encounter, and examined the patient. There are no significant changes    Clinical Conditions Present on Arrival:  Clinically Significant Risk Factors Present on Admission                # Drug Induced Coagulation Defect: home medication list includes an anticoagulant medication   # Obesity: Estimated body mass index is 35.13 kg/m  as calculated from the following:    Height as of this encounter: 1.829 m (6').    Weight as of this encounter: 117.5 kg (259 lb).        Oral temp 92.6 - angelia camacho initiated at bedside at this time

## 2024-05-03 NOTE — ANESTHESIA POSTPROCEDURE EVALUATION
Patient: Ravin Swanson    Procedure: Procedure(s):  COLONOSCOPY with POLYPECTOMY       Anesthesia Type:  MAC    Note:     Postop Pain Control: Uneventful            Sign Out: Well controlled pain   PONV: No   Neuro/Psych: Uneventful            Sign Out: Acceptable/Baseline neuro status   Airway/Respiratory: Uneventful            Sign Out: Acceptable/Baseline resp. status   CV/Hemodynamics: Uneventful            Sign Out: Acceptable CV status; No obvious hypovolemia; No obvious fluid overload   Other NRE: NONE   DID A NON-ROUTINE EVENT OCCUR? No           Last vitals:  Vitals Value Taken Time   /83 05/03/24 0850   Temp 36.3  C (97.3  F) 05/03/24 0850   Pulse 95 05/03/24 0851   Resp 16 05/03/24 0850   SpO2 96 % 05/03/24 0852   Vitals shown include unfiled device data.    Electronically Signed By: Asher Fang MD

## 2024-05-06 LAB
PATH REPORT.COMMENTS IMP SPEC: NORMAL
PATH REPORT.COMMENTS IMP SPEC: NORMAL
PATH REPORT.FINAL DX SPEC: NORMAL
PATH REPORT.GROSS SPEC: NORMAL
PATH REPORT.MICROSCOPIC SPEC OTHER STN: NORMAL
PATH REPORT.RELEVANT HX SPEC: NORMAL
PHOTO IMAGE: NORMAL

## 2024-05-06 PROCEDURE — 88305 TISSUE EXAM BY PATHOLOGIST: CPT | Mod: 26 | Performed by: PATHOLOGY

## 2024-05-07 NOTE — PROGRESS NOTES
"        Assessment/Recommendations   Assessment:    1.  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    Current home weight is stable at 257 to 262 pounds.    Patient is mild hypervolemic on exam.    PÉREZ and fatigue walking up incline.     Heart failure regimen includes:    -Beta-blocker therapy with metoprolol succinate 50 mg daily    -ACE inhibitor with lisinopril 5 mg daily    -Aldosterone blocker therapy with spironolactone 12.5 mg daily    -Not on SGLT2 Inhibitor     -Diuretic therapy with furosemide 10 mg daily    -BMP on 4/26/2024 showed sodium 141, potassium 4.3 and creatinine 1.17.    2.  Persistent Atrial fibrillation: Heart rate in 80's- improved HR since increasing metoprolol.  Limits his coffee to 2-3 cups a day  On Xarelto for stroke prophylaxis.    3.  Morbid obesity: Patient reported weight loss of 20 lbs in the last 1 year.    4.  Possible obstructive sleep apnea: Referred to sleep clinic for evaluation.    Plan/Recommendation:  -We discussed about increasing his diuretic therapy.  Patient is agreeable.  -Increase furosemide from 10 mg daily to 20 mg daily  -Increase spironolactone from 12.5 mg daily to 25 mg daily.  -Encouraged to call with any adverse effect  -BMP in 2 weeks    Patient is traveling out of town for a couple weeks in early June.  He will follow-up with Dr. Landrum in early July as scheduled. Follow up with me in 3 months in heart failure clinic or sooner if needed     The longitudinal plan of care for cardiomyopathy likely tachycardia mediated, acute systolic heart failure, persistent atrial fibrillation, and morbid obesity was addressed during this visit.?Due to the added   complexity in care, I will continue to support Ravin Sky in the subsequent management of this   condition(s) and with the ongoing continuity of care of this condition(s)\".     History of Present Illness/Subjective    Mr. Ravin WICK " Sky is a 83 year old male with a past medical history of morbid obesity, COVID infection in November 2023, dyspnea on exertion, and recent diagnosis of tachycardia mediated cardiomyopathy, acute systolic heart failure, and persistent atrial fibrillation with RVR who is seen at [facility] for continued heart failure follow-up.    Patient saw Dr Landrum early this month for consultation.  Note reviewed.    During last heart failure clinic visit, patient reported that he felt about the same since his Dr. Landrum.  He is tolerating his heart failure medications without any adverse effect.  He continues to have shortness of breath walking up incline which is unchanged.  He states this is unchanged since his COVID-19 infection in November 2023.    Today, Ravin reports that he has been stable from heart failure standpoint since last heart failure clinic visit.  He is tolerating increased dose of metoprolol with no adverse effect.  He continues to have some fatigue and shortness of breath walking up incline.  He denies lightheadedness, shortness of breath, orthopnea, PND, palpitations, chest pain, abdominal fullness/bloating, and lower extremity edema.  He denies bleeding complications.    He goes to the gym and exercises regularly for about 45 minutes at a time.    He is trying to watch his sodium intake.  He reports adequate fluid intake.  He has cut back on his caffeine.    ECHO from 1/8/24-Reviewed:   Interpretation Summary   The left ventricle is normal in size.  There is moderate concentric left ventricular hypertrophy.  The visual ejection fraction is 40-45%.  There is mild-moderate global hypokinesia of the left ventricle.  The right ventricle is mildly dilated.  Moderately decreased right ventricular systolic function  The left atrium is severely dilated.  The right ventricular systolic pressure is approximated at 30mmHg plus the  right atrial pressure.  Ascending Aorta dilatation is  present.  The rhythm was rapid atrial fibrillation.  There is no comparison study available.     Physical Examination Review of Systems   /72 (BP Location: Right arm, Patient Position: Sitting, Cuff Size: Adult Large)   Pulse 88   Resp 16   Ht 1.829 m (6')   Wt 120.2 kg (265 lb)   BMI 35.94 kg/m    Body mass index is 35.94 kg/m .  Wt Readings from Last 3 Encounters:   05/08/24 120.2 kg (265 lb)   05/03/24 117.5 kg (259 lb)   03/27/24 118.9 kg (262 lb 1.6 oz)     General Appearance:   no distress, normal body habitus   ENT/Mouth: membranes moist, no oral lesions or bleeding gums.      EYES:  no scleral icterus, normal conjunctivae   Neck: no carotid bruits or thyromegaly   Chest/Lungs:   lungs are clear to auscultation, no rales or wheezing, equal chest wall expansion    Cardiovascular:   Irregularly irregular; Normal first and second heart sounds with no murmurs, rubs, or gallops; the carotid, radial and posterior tibial pulses are intact, JVP is difficult to assess due to the patient's obesity and body habitus   , mild edema bilaterally    Abdomen:  Large but soft; organomegaly, masses, bruits, or tenderness; bowel sounds are present   Extremities   no cyanosis or clubbing; CMS intact   Skin: no xanthelasma, warm.    Neurologic: normal  bilateral, no tremors   Psychiatric: alert and oriented x3, calm                                                    Negative unless noted in HPI     Medical History  Surgical History Family History Social History   Past Medical History:   Diagnosis Date    Hypertension     Past Surgical History:   Procedure Laterality Date    COLONOSCOPY      COLONOSCOPY N/A 5/3/2024    Procedure: COLONOSCOPY with POLYPECTOMY;  Surgeon: Barry Hayes MD;  Location: [facility] OR    GENITOURINARY SURGERY      No family history on file. Social History     Socioeconomic History    Marital status:      Spouse name: Not on file    Number of children: Not on file     "Years of education: Not on file    Highest education level: Not on file   Occupational History    Not on file   Tobacco Use    Smoking status: Former     Current packs/day: 0.00     Types: Cigarettes     Quit date:      Years since quittin.3    Smokeless tobacco: Never   Vaping Use    Vaping status: Never Used   Substance and Sexual Activity    Alcohol use: Yes     Comment: minimal    Drug use: Never    Sexual activity: Not on file   Other Topics Concern    Not on file   Social History Narrative    Not on file     Social Determinants of Health     Financial Resource Strain: Not on file   Food Insecurity: Not on file   Transportation Needs: Not on file   Physical Activity: Not on file   Stress: Not on file   Social Connections: Not on file   Interpersonal Safety: Not on file   Housing Stability: Not on file          Medications  Allergies   Current Outpatient Medications   Medication Sig Dispense Refill    furosemide (LASIX) 20 MG tablet Take 1 tablet (20 mg) by mouth daily 3090 tablet 3    lisinopril (ZESTRIL) 5 MG tablet Take 1 tablet (5 mg) by mouth daily 90 tablet 3    metoprolol succinate ER (TOPROL XL) 50 MG 24 hr tablet Take 1 tablet (50 mg) by mouth daily 90 tablet 3    rivaroxaban ANTICOAGULANT (XARELTO) 20 MG TABS tablet Take 1 tablet (20 mg) by mouth daily (with dinner) 30 tablet 11    spironolactone (ALDACTONE) 25 MG tablet Take 1 tablet (25 mg) by mouth daily 90 tablet 3    XHANCE 93 MCG/ACT nasal spray Spray into both nostrils 2 times daily (Patient not taking: Reported on 2024)      No Known Allergies      Lab Results    Chemistry/lipid CBC Cardiac Enzymes/BNP/TSH/INR   Lab Results   Component Value Date    CHOL 124 2024    HDL 44 2024    TRIG 66 2024    BUN 15.4 2024     2024    CO2 27 2024    No results found for: \"WBC\", \"HGB\", \"HCT\", \"MCV\", \"PLT\" No results found for: \"CKTOTAL\", \"CKMB\", \"TROPONINI\", \"BNP\", \"TSH\", \"INR\"     34 minutes spent on " the date of encounter doing chart review, review of test results, interpretation with above tests, patient visit, and documentation.        This note has been dictated using voice recognition software. Any grammatical, typographical, or context distortions are unintentional and inherent to the software

## 2024-05-08 ENCOUNTER — OFFICE VISIT (OUTPATIENT)
Dept: CARDIOLOGY | Facility: CLINIC | Age: 84
End: 2024-05-08
Payer: COMMERCIAL

## 2024-05-08 VITALS
WEIGHT: 265 LBS | HEIGHT: 72 IN | DIASTOLIC BLOOD PRESSURE: 72 MMHG | HEART RATE: 88 BPM | BODY MASS INDEX: 35.89 KG/M2 | SYSTOLIC BLOOD PRESSURE: 114 MMHG | RESPIRATION RATE: 16 BRPM

## 2024-05-08 DIAGNOSIS — G47.33 OSA (OBSTRUCTIVE SLEEP APNEA): ICD-10-CM

## 2024-05-08 DIAGNOSIS — E66.01 MORBID OBESITY (H): ICD-10-CM

## 2024-05-08 DIAGNOSIS — I50.22 CHRONIC SYSTOLIC CONGESTIVE HEART FAILURE (H): Primary | ICD-10-CM

## 2024-05-08 DIAGNOSIS — I43 TACHYCARDIA INDUCED CARDIOMYOPATHY (H): ICD-10-CM

## 2024-05-08 DIAGNOSIS — I48.19 PERSISTENT ATRIAL FIBRILLATION (H): ICD-10-CM

## 2024-05-08 DIAGNOSIS — R00.0 TACHYCARDIA INDUCED CARDIOMYOPATHY (H): ICD-10-CM

## 2024-05-08 PROCEDURE — G2211 COMPLEX E/M VISIT ADD ON: HCPCS | Performed by: NURSE PRACTITIONER

## 2024-05-08 PROCEDURE — 99214 OFFICE O/P EST MOD 30 MIN: CPT | Performed by: NURSE PRACTITIONER

## 2024-05-08 RX ORDER — LISINOPRIL 5 MG/1
5 TABLET ORAL DAILY
Qty: 90 TABLET | Refills: 3 | Status: SHIPPED | OUTPATIENT
Start: 2024-05-08

## 2024-05-08 RX ORDER — FUROSEMIDE 20 MG
20 TABLET ORAL DAILY
Qty: 3090 TABLET | Refills: 3 | Status: SHIPPED | OUTPATIENT
Start: 2024-05-08 | End: 2024-08-09

## 2024-05-08 RX ORDER — SPIRONOLACTONE 25 MG/1
25 TABLET ORAL DAILY
Qty: 90 TABLET | Refills: 3 | Status: SHIPPED | OUTPATIENT
Start: 2024-05-08

## 2024-05-08 NOTE — LETTER
5/8/2024    Ravin Dockery MD  [address] [place] [address]    RE: Ravin Swanson       Dear Colleague,     I had the pleasure of seeing Ravin Swanson in the [facility].          Assessment/Recommendations   Assessment:    1.  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    Current home weight is stable at 257 to 262 pounds.    Patient is mild hypervolemic on exam.    PÉREZ and fatigue walking up incline.     Heart failure regimen includes:    -Beta-blocker therapy with metoprolol succinate 50 mg daily    -ACE inhibitor with lisinopril 5 mg daily    -Aldosterone blocker therapy with spironolactone 12.5 mg daily    -Not on SGLT2 Inhibitor     -Diuretic therapy with furosemide 10 mg daily    -BMP on 4/26/2024 showed sodium 141, potassium 4.3 and creatinine 1.17.    2.  Persistent Atrial fibrillation: Heart rate in 80's- improved HR since increasing metoprolol.  Limits his coffee to 2-3 cups a day  On Xarelto for stroke prophylaxis.    3.  Morbid obesity: Patient reported weight loss of 20 lbs in the last 1 year.    4.  Possible obstructive sleep apnea: Referred to sleep clinic for evaluation.    Plan/Recommendation:  -We discussed about increasing his diuretic therapy.  Patient is agreeable.  -Increase furosemide from 10 mg daily to 20 mg daily  -Increase spironolactone from 12.5 mg daily to 25 mg daily.  -Encouraged to call with any adverse effect  -BMP in 2 weeks    Patient is traveling out of town for a couple weeks in early June.  He will follow-up with Dr. Landrum in early July as scheduled. Follow up with me in 3 months in heart failure clinic or sooner if needed     The longitudinal plan of care for cardiomyopathy likely tachycardia mediated, acute systolic heart failure, persistent atrial fibrillation, and morbid obesity was addressed during this visit.?Due to the added   complexity in care, I will  "continue to support Ravin Swanson in the subsequent management of this   condition(s) and with the ongoing continuity of care of this condition(s)\".     History of Present Illness/Subjective    Mr. Ravin Swanson is a 83 year old male with a past medical history of morbid obesity, COVID infection in November 2023, dyspnea on exertion, and recent diagnosis of tachycardia mediated cardiomyopathy, acute systolic heart failure, and persistent atrial fibrillation with RVR who is seen at [facility] for continued heart failure follow-up.    Patient saw Dr Landrum early this month for consultation.  Note reviewed.    During last heart failure clinic visit, patient reported that he felt about the same since his Dr. Landrum.  He is tolerating his heart failure medications without any adverse effect.  He continues to have shortness of breath walking up incline which is unchanged.  He states this is unchanged since his COVID-19 infection in November 2023.    Today, Ravin reports that he has been stable from heart failure standpoint since last heart failure clinic visit.  He is tolerating increased dose of metoprolol with no adverse effect.  He continues to have some fatigue and shortness of breath walking up incline.  He denies lightheadedness, shortness of breath, orthopnea, PND, palpitations, chest pain, abdominal fullness/bloating, and lower extremity edema.  He denies bleeding complications.    He goes to the gym and exercises regularly for about 45 minutes at a time.    He is trying to watch his sodium intake.  He reports adequate fluid intake.  He has cut back on his caffeine.    ECHO from 1/8/24-Reviewed:   Interpretation Summary   The left ventricle is normal in size.  There is moderate concentric left ventricular hypertrophy.  The visual ejection fraction is 40-45%.  There is mild-moderate global hypokinesia of the left ventricle.  The right ventricle is mildly dilated.  Moderately " decreased right ventricular systolic function  The left atrium is severely dilated.  The right ventricular systolic pressure is approximated at 30mmHg plus the  right atrial pressure.  Ascending Aorta dilatation is present.  The rhythm was rapid atrial fibrillation.  There is no comparison study available.     Physical Examination Review of Systems   /72 (BP Location: Right arm, Patient Position: Sitting, Cuff Size: Adult Large)   Pulse 88   Resp 16   Ht 1.829 m (6')   Wt 120.2 kg (265 lb)   BMI 35.94 kg/m    Body mass index is 35.94 kg/m .  Wt Readings from Last 3 Encounters:   05/08/24 120.2 kg (265 lb)   05/03/24 117.5 kg (259 lb)   03/27/24 118.9 kg (262 lb 1.6 oz)     General Appearance:   no distress, normal body habitus   ENT/Mouth: membranes moist, no oral lesions or bleeding gums.      EYES:  no scleral icterus, normal conjunctivae   Neck: no carotid bruits or thyromegaly   Chest/Lungs:   lungs are clear to auscultation, no rales or wheezing, equal chest wall expansion    Cardiovascular:   Irregularly irregular; Normal first and second heart sounds with no murmurs, rubs, or gallops; the carotid, radial and posterior tibial pulses are intact, JVP is difficult to assess due to the patient's obesity and body habitus   , mild edema bilaterally    Abdomen:  Large but soft; organomegaly, masses, bruits, or tenderness; bowel sounds are present   Extremities   no cyanosis or clubbing; CMS intact   Skin: no xanthelasma, warm.    Neurologic: normal  bilateral, no tremors   Psychiatric: alert and oriented x3, calm                                                    Negative unless noted in HPI     Medical History  Surgical History Family History Social History   Past Medical History:   Diagnosis Date    Hypertension     Past Surgical History:   Procedure Laterality Date    COLONOSCOPY      COLONOSCOPY N/A 5/3/2024    Procedure: COLONOSCOPY with POLYPECTOMY;  Surgeon: Barry Hayes MD;  Location:  [facility] OR    GENITOURINARY SURGERY      No family history on file. Social History     Socioeconomic History    Marital status:      Spouse name: Not on file    Number of children: Not on file    Years of education: Not on file    Highest education level: Not on file   Occupational History    Not on file   Tobacco Use    Smoking status: Former     Current packs/day: 0.00     Types: Cigarettes     Quit date:      Years since quittin.3    Smokeless tobacco: Never   Vaping Use    Vaping status: Never Used   Substance and Sexual Activity    Alcohol use: Yes     Comment: minimal    Drug use: Never    Sexual activity: Not on file   Other Topics Concern    Not on file   Social History Narrative    Not on file     Social Determinants of Health     Financial Resource Strain: Not on file   Food Insecurity: Not on file   Transportation Needs: Not on file   Physical Activity: Not on file   Stress: Not on file   Social Connections: Not on file   Interpersonal Safety: Not on file   Housing Stability: Not on file          Medications  Allergies   Current Outpatient Medications   Medication Sig Dispense Refill    furosemide (LASIX) 20 MG tablet Take 1 tablet (20 mg) by mouth daily 3090 tablet 3    lisinopril (ZESTRIL) 5 MG tablet Take 1 tablet (5 mg) by mouth daily 90 tablet 3    metoprolol succinate ER (TOPROL XL) 50 MG 24 hr tablet Take 1 tablet (50 mg) by mouth daily 90 tablet 3    rivaroxaban ANTICOAGULANT (XARELTO) 20 MG TABS tablet Take 1 tablet (20 mg) by mouth daily (with dinner) 30 tablet 11    spironolactone (ALDACTONE) 25 MG tablet Take 1 tablet (25 mg) by mouth daily 90 tablet 3    XHANCE 93 MCG/ACT nasal spray Spray into both nostrils 2 times daily (Patient not taking: Reported on 2024)      No Known Allergies      Lab Results    Chemistry/lipid CBC Cardiac Enzymes/BNP/TSH/INR   Lab Results   Component Value Date    CHOL 124 2024    HDL 44 2024    TRIG 66 2024    BUN 15.4  "04/26/2024     04/26/2024    CO2 27 04/26/2024    No results found for: \"WBC\", \"HGB\", \"HCT\", \"MCV\", \"PLT\" No results found for: \"CKTOTAL\", \"CKMB\", \"TROPONINI\", \"BNP\", \"TSH\", \"INR\"     34 minutes spent on the date of encounter doing chart review, review of test results, interpretation with above tests, patient visit, and documentation.        This note has been dictated using voice recognition software. Any grammatical, typographical, or context distortions are unintentional and inherent to the software          Thank you for allowing me to participate in the care of your patient.      Sincerely,     DARLENE Vaughn CNP     [facility]  cc:   DARLENE Vaughn CNP  [address]  [place] [address]      "

## 2024-05-08 NOTE — PATIENT INSTRUCTIONS
Ravin Swanson,    It was a pleasure to see you today at the [facility].     My recommendations after this visit include:    - Increase Furosemide from 10 mg to 20 mg daily     - Increase Spironolactone from 12.5 mg to 25 mg daily    - Please call if you develop lightheaded, dizziness or any new side effects    - Schedule BMP in 2 weeks     - Follow up with Dr. Landrum as scheduled in July    - Follow up Shalom in 3 months in Heart Failure Clinic    -Limit salt intake to < 2000 mg/day, daily weight monitoring, and maintain adequate fluid intake at 50 to 60 ounces per day    -Please call if you experience persistent weight gain 2 to 3 pounds 2 days in a row or 5 pounds in a week with shortness of breath, abdominal bloating and leg swelling    - Please call Heart Failure Nurse Line at [phone], if you have any questions or concerns

## 2024-05-16 NOTE — ANESTHESIA CARE TRANSFER NOTE
Patient: Ravin Swanson    Procedure: Procedure(s):  COLONOSCOPY with POLYPECTOMY       Diagnosis: Screening for colon cancer [Z12.11]  Diagnosis Additional Information: No value filed.    Anesthesia Type:   MAC     Note:    Oropharynx: oropharynx clear of all foreign objects and spontaneously breathing  Level of Consciousness: awake  Oxygen Supplementation: room air    Independent Airway: airway patency satisfactory and stable  Dentition: dentition unchanged  Vital Signs Stable: post-procedure vital signs reviewed and stable  Report to RN Given: handoff report given  Patient transferred to: Phase II    Handoff Report: Identifed the Patient, Identified the Reponsible Provider, Reviewed the pertinent medical history, Discussed the surgical course, Reviewed Intra-OP anesthesia mangement and issues during anesthesia, Set expectations for post-procedure period and Allowed opportunity for questions and acknowledgement of understanding          Electronically Signed By: DARLENE SHARP [facility]  May 16, 2024  9:14 AM

## 2024-05-16 NOTE — ADDENDUM NOTE
Addendum  created 05/16/24 0915 by Leslie Monge APRN CRNA    Clinical Note Signed, Intraprocedure Event deleted, Intraprocedure Event edited

## 2024-05-21 ENCOUNTER — LAB (OUTPATIENT)
Dept: CARDIOLOGY | Facility: CLINIC | Age: 84
End: 2024-05-21
Payer: COMMERCIAL

## 2024-05-21 DIAGNOSIS — I50.22 CHRONIC SYSTOLIC CONGESTIVE HEART FAILURE (H): Primary | ICD-10-CM

## 2024-05-21 DIAGNOSIS — I43 TACHYCARDIA INDUCED CARDIOMYOPATHY (H): ICD-10-CM

## 2024-05-21 DIAGNOSIS — E66.01 MORBID OBESITY (H): ICD-10-CM

## 2024-05-21 DIAGNOSIS — I48.19 PERSISTENT ATRIAL FIBRILLATION (H): ICD-10-CM

## 2024-05-21 DIAGNOSIS — R00.0 TACHYCARDIA INDUCED CARDIOMYOPATHY (H): ICD-10-CM

## 2024-05-21 DIAGNOSIS — I50.22 CHRONIC SYSTOLIC CONGESTIVE HEART FAILURE (H): ICD-10-CM

## 2024-05-21 DIAGNOSIS — G47.33 OSA (OBSTRUCTIVE SLEEP APNEA): ICD-10-CM

## 2024-05-21 LAB
ANION GAP SERPL CALCULATED.3IONS-SCNC: 8 MMOL/L (ref 7–15)
BUN SERPL-MCNC: 21.3 MG/DL (ref 8–23)
CALCIUM SERPL-MCNC: 8.9 MG/DL (ref 8.8–10.2)
CHLORIDE SERPL-SCNC: 104 MMOL/L (ref 98–107)
CREAT SERPL-MCNC: 1.33 MG/DL (ref 0.67–1.17)
DEPRECATED HCO3 PLAS-SCNC: 30 MMOL/L (ref 22–29)
EGFRCR SERPLBLD CKD-EPI 2021: 53 ML/MIN/1.73M2
GLUCOSE SERPL-MCNC: 80 MG/DL (ref 70–99)
POTASSIUM SERPL-SCNC: 4.3 MMOL/L (ref 3.4–5.3)
SODIUM SERPL-SCNC: 142 MMOL/L (ref 135–145)

## 2024-05-21 PROCEDURE — 80048 BASIC METABOLIC PNL TOTAL CA: CPT

## 2024-05-21 PROCEDURE — 36415 COLL VENOUS BLD VENIPUNCTURE: CPT

## 2024-05-28 ENCOUNTER — TRANSFERRED RECORDS (OUTPATIENT)
Dept: HEALTH INFORMATION MANAGEMENT | Facility: CLINIC | Age: 84
End: 2024-05-28

## 2024-06-04 ENCOUNTER — LAB (OUTPATIENT)
Dept: CARDIOLOGY | Facility: CLINIC | Age: 84
End: 2024-06-04
Payer: COMMERCIAL

## 2024-06-04 DIAGNOSIS — I50.22 CHRONIC SYSTOLIC CONGESTIVE HEART FAILURE (H): ICD-10-CM

## 2024-06-04 LAB
ANION GAP SERPL CALCULATED.3IONS-SCNC: 11 MMOL/L (ref 7–15)
BUN SERPL-MCNC: 27.1 MG/DL (ref 8–23)
CALCIUM SERPL-MCNC: 8.7 MG/DL (ref 8.8–10.2)
CHLORIDE SERPL-SCNC: 102 MMOL/L (ref 98–107)
CREAT SERPL-MCNC: 1.25 MG/DL (ref 0.67–1.17)
DEPRECATED HCO3 PLAS-SCNC: 25 MMOL/L (ref 22–29)
EGFRCR SERPLBLD CKD-EPI 2021: 57 ML/MIN/1.73M2
GLUCOSE SERPL-MCNC: 113 MG/DL (ref 70–99)
POTASSIUM SERPL-SCNC: 4.7 MMOL/L (ref 3.4–5.3)
SODIUM SERPL-SCNC: 138 MMOL/L (ref 135–145)

## 2024-06-04 PROCEDURE — 36415 COLL VENOUS BLD VENIPUNCTURE: CPT

## 2024-06-04 PROCEDURE — 80048 BASIC METABOLIC PNL TOTAL CA: CPT

## 2024-07-09 ENCOUNTER — OFFICE VISIT (OUTPATIENT)
Dept: CARDIOLOGY | Facility: CLINIC | Age: 84
End: 2024-07-09
Attending: INTERNAL MEDICINE
Payer: COMMERCIAL

## 2024-07-09 VITALS
BODY MASS INDEX: 36.21 KG/M2 | DIASTOLIC BLOOD PRESSURE: 66 MMHG | HEART RATE: 74 BPM | OXYGEN SATURATION: 98 % | WEIGHT: 267 LBS | RESPIRATION RATE: 16 BRPM | SYSTOLIC BLOOD PRESSURE: 110 MMHG

## 2024-07-09 DIAGNOSIS — I48.19 PERSISTENT ATRIAL FIBRILLATION (H): ICD-10-CM

## 2024-07-09 DIAGNOSIS — E66.01 MORBID OBESITY (H): ICD-10-CM

## 2024-07-09 DIAGNOSIS — R06.09 DYSPNEA ON EXERTION: Primary | ICD-10-CM

## 2024-07-09 DIAGNOSIS — I42.9 CARDIOMYOPATHY, UNSPECIFIED TYPE (H): ICD-10-CM

## 2024-07-09 DIAGNOSIS — I50.21 ACUTE SYSTOLIC CONGESTIVE HEART FAILURE (H): ICD-10-CM

## 2024-07-09 PROCEDURE — 99214 OFFICE O/P EST MOD 30 MIN: CPT | Performed by: INTERNAL MEDICINE

## 2024-07-09 NOTE — PROGRESS NOTES
Click to link to [facility] HEART CARE NOTE       Assessment/Plan:   Persistent atrial fibrillation: His pulse is well-controlled with metoprolol XL 50 mg daily.  He has no palpitations.  Continue metoprolol XL 50 mg daily. His [id number] score 3 due to age, CHF.  Continue Xarelto 20 mg at suppertime for prevention of stroke.  No side effects so far.  Cardiomyopathy, LVEF of 40 to 45%, chronic systolic congestive heart failure: The patient's ventricular rate is better controlled.  He still complains of shortness of breath on exertion.  We discussed the evaluation and management.  After discussion, stress cardiac MRI is requested for evaluation of #1 myocardial ischemia, #2 heart function and structure, #3 other possible etiology of her cardiomyopathy.  Depend on stress cardiac MRI findings, and then decide the next step.  If he has no inducible myocardial ischemia, still reduced left ventricular ejection fraction, no out the etiology of her cardiomyopathy, and then consider ablation of atrial fibrillation.  The patient agreed with the plan.  Continue metoprolol succinate 50 mg daily, lisinopril 5 mg daily, spironolactone 12.5 mg daily, furosemide 20 mg daily.  Laboratory tests are reviewed, stable.  Morbid obesity: Continue lifestyle modification.  Referred the patient to sleep medicine for evaluation of obstructive sleep apnea.    We will follow-up his stress cardiac MRI report, discuss the findings with the patient.  Thank you for the opportunity to be involved in the care of Ravin Swanson. If you have any questions, please feel free to contact me.  I will see the patient again in 6 months and as needed    Much or all of the text in this note was generated through the use of Dragon Dictate voice-to-text software. Errors in spelling or words which seem out of context are unintentional.   Sound alike errors, in particular, may have escaped editing.       History of Present  Illness:   It is my pleasure to see Ravin Swanson at the [facility] for routine cardiology follow-up. Ravin Swanson is a 84 year old male with a medical history of morbid obesity, history of prostate cancer status post surgical treatment, persistent atrial fibrillation, cardiomyopathy with LVEF 40 to 45%.    The patient states that he still has shortness of breath on exertion, mild fatigue.  He denies chest pain, palpitations, dizziness, orthopnea, PND.  He has trace leg edema which has been stable.  His blood pressure and heart rate are controlled.  No side effects from current medications.  Laboratory test are reviewed, stable.    Past Medical History:     Patient Active Problem List   Diagnosis    Herpes Zoster (Shingles)    Morbid obesity (H)    Tachycardia induced cardiomyopathy (H)    Acute systolic congestive heart failure (H)    Persistent atrial fibrillation (H) with RVR    Possible WALTER (obstructive sleep apnea)       Past Surgical History:     Past Surgical History:   Procedure Laterality Date    COLONOSCOPY      COLONOSCOPY N/A 5/3/2024    Procedure: COLONOSCOPY with POLYPECTOMY;  Surgeon: Barry Hayes MD;  Location: [facility] OR    GENITOURINARY SURGERY         Family History:   Reviewed: His brother has some kind of heart disease.    Social History:    reports that he quit smoking about 38 years ago. His smoking use included cigarettes. He has never used smokeless tobacco. He reports current alcohol use. He reports that he does not use drugs.    Review of Systems:   12 systems are reviewed negative except for in HPI.    Meds:     Current Outpatient Medications:     furosemide (LASIX) 20 MG tablet, Take 1 tablet (20 mg) by mouth daily, Disp: 3090 tablet, Rfl: 3    lisinopril (ZESTRIL) 5 MG tablet, Take 1 tablet (5 mg) by mouth daily, Disp: 90 tablet, Rfl: 3    metoprolol succinate ER (TOPROL XL) 50 MG 24 hr tablet, Take 1 tablet (50 mg) by mouth daily, Disp: 90  tablet, Rfl: 3    rivaroxaban ANTICOAGULANT (XARELTO) 20 MG TABS tablet, Take 1 tablet (20 mg) by mouth daily (with dinner), Disp: 30 tablet, Rfl: 11    spironolactone (ALDACTONE) 25 MG tablet, Take 1 tablet (25 mg) by mouth daily, Disp: 90 tablet, Rfl: 3    XHANCE 93 MCG/ACT nasal spray, Spray into both nostrils 2 times daily, Disp: , Rfl:      Allergies:   Patient has no known allergies.    Objective:      Physical Exam  121.1 kg (267 lb)     Body mass index is 36.21 kg/m .  /66 (BP Location: Left arm, Patient Position: Sitting, Cuff Size: Adult Large)   Pulse 74   Resp 16   Wt 121.1 kg (267 lb)   SpO2 98%   BMI 36.21 kg/m      General Appearance:   Awake, Alert, No acute distress.   HEENT:  Pupil equal, reactive to light. No scleral icterus; the mucous membranes were moist. No oral ulcers or thrush.    Neck: No cervical bruits. No JVD. No thyromegaly. No lymph node enlargement or tenderness.   Chest: The spine was straight. The chest was symmetric.   Lungs:   Respirations unlabored. Lungs are clear to auscultation. No crackles. No wheezing.   Cardiovascular:   Irregularly irregular, normal rate, normal first and second heart sounds with no murmurs. No rubs or gallops.    Abdomen:  Obese. Soft. No tenderness. Non-distended. Bowels sounds are present   Extremities: Equal posterior tibial pulses. Trace leg edema.   Skin: No rashes or ulcers. Warm, Dry.   Musculoskeletal: No tenderness. No deformity.   Neurologic: Mood and affect are appropriate. No focal deficits.         EKG:  Personally reivewed  Atrial fibrillation with RVR  No previous ECG for comparison    Cardiac Imaging Studies  ECHO on 1-8-2024:  The left ventricle is normal in size.  There is moderate concentric left ventricular hypertrophy.  The visual ejection fraction is 40-45%.  There is mild-moderate global hypokinesia of the left ventricle.  The right ventricle is mildly dilated.  Moderately decreased right ventricular systolic  "function  The left atrium is severely dilated.  The right ventricular systolic pressure is approximated at 30mmHg plus the  right atrial pressure.  Ascending Aorta dilatation is present.  The rhythm was rapid atrial fibrillation.  There is no comparison study available.    Lab Review   Lab Results   Component Value Date     01/03/2024    CO2 27 01/03/2024    BUN 13.6 01/03/2024     No results found for: \"WBC\", \"HGB\", \"HCT\", \"MCV\", \"PLT\"  Lab Results   Component Value Date    CHOL 124 01/03/2024    TRIG 66 01/03/2024    HDL 44 01/03/2024    LDL 67 01/03/2024     LDL Cholesterol Calculated   Date Value Ref Range Status   01/03/2024 67 <=100 mg/dL Final               "

## 2024-07-09 NOTE — LETTER
7/9/2024    Ravin Dockery MD  [address] [place] [address]    RE: Ravin Swanson       Dear Colleague,     I had the pleasure of seeing Ravin Swanson in the [facility].      Click to link to [facility] HEART CARE NOTE       Assessment/Plan:   Persistent atrial fibrillation: His pulse is well-controlled with metoprolol XL 50 mg daily.  He has no palpitations.  Continue metoprolol XL 50 mg daily. His [id number] score 3 due to age, CHF.  Continue Xarelto 20 mg at suppertime for prevention of stroke.  No side effects so far.  Cardiomyopathy, LVEF of 40 to 45%, chronic systolic congestive heart failure: The patient's ventricular rate is better controlled.  He still complains of shortness of breath on exertion.  We discussed the evaluation and management.  After discussion, stress cardiac MRI is requested for evaluation of #1 myocardial ischemia, #2 heart function and structure, #3 other possible etiology of her cardiomyopathy.  Depend on stress cardiac MRI findings, and then decide the next step.  If he has no inducible myocardial ischemia, still reduced left ventricular ejection fraction, no out the etiology of her cardiomyopathy, and then consider ablation of atrial fibrillation.  The patient agreed with the plan.  Continue metoprolol succinate 50 mg daily, lisinopril 5 mg daily, spironolactone 12.5 mg daily, furosemide 20 mg daily.  Laboratory tests are reviewed, stable.  Morbid obesity: Continue lifestyle modification.  Referred the patient to sleep medicine for evaluation of obstructive sleep apnea.    We will follow-up his stress cardiac MRI report, discuss the findings with the patient.  Thank you for the opportunity to be involved in the care of Ravin Swanson. If you have any questions, please feel free to contact me.  I will see the patient again in 6 months and as needed    Much or all of the text in this note was generated through the use of  Dragon Dictate voice-to-text software. Errors in spelling or words which seem out of context are unintentional.   Sound alike errors, in particular, may have escaped editing.       History of Present Illness:   It is my pleasure to see Ravin Swanson at the [facility] clinic for routine cardiology follow-up. Ravin Swanson is a 84 year old male with a medical history of morbid obesity, history of prostate cancer status post surgical treatment, persistent atrial fibrillation, cardiomyopathy with LVEF 40 to 45%.    The patient states that he still has shortness of breath on exertion, mild fatigue.  He denies chest pain, palpitations, dizziness, orthopnea, PND.  He has trace leg edema which has been stable.  His blood pressure and heart rate are controlled.  No side effects from current medications.  Laboratory test are reviewed, stable.    Past Medical History:     Patient Active Problem List   Diagnosis    Herpes Zoster (Shingles)    Morbid obesity (H)    Tachycardia induced cardiomyopathy (H)    Acute systolic congestive heart failure (H)    Persistent atrial fibrillation (H) with RVR    Possible WALTER (obstructive sleep apnea)       Past Surgical History:     Past Surgical History:   Procedure Laterality Date    COLONOSCOPY      COLONOSCOPY N/A 5/3/2024    Procedure: COLONOSCOPY with POLYPECTOMY;  Surgeon: Barry Hayes MD;  Location: [facility] OR    GENITOURINARY SURGERY         Family History:   Reviewed: His brother has some kind of heart disease.    Social History:    reports that he quit smoking about 38 years ago. His smoking use included cigarettes. He has never used smokeless tobacco. He reports current alcohol use. He reports that he does not use drugs.    Review of Systems:   12 systems are reviewed negative except for in HPI.    Meds:     Current Outpatient Medications:     furosemide (LASIX) 20 MG tablet, Take 1 tablet (20 mg) by mouth daily, Disp: 3090 tablet, Rfl: 3     lisinopril (ZESTRIL) 5 MG tablet, Take 1 tablet (5 mg) by mouth daily, Disp: 90 tablet, Rfl: 3    metoprolol succinate ER (TOPROL XL) 50 MG 24 hr tablet, Take 1 tablet (50 mg) by mouth daily, Disp: 90 tablet, Rfl: 3    rivaroxaban ANTICOAGULANT (XARELTO) 20 MG TABS tablet, Take 1 tablet (20 mg) by mouth daily (with dinner), Disp: 30 tablet, Rfl: 11    spironolactone (ALDACTONE) 25 MG tablet, Take 1 tablet (25 mg) by mouth daily, Disp: 90 tablet, Rfl: 3    XHANCE 93 MCG/ACT nasal spray, Spray into both nostrils 2 times daily, Disp: , Rfl:      Allergies:   Patient has no known allergies.    Objective:      Physical Exam  121.1 kg (267 lb)     Body mass index is 36.21 kg/m .  /66 (BP Location: Left arm, Patient Position: Sitting, Cuff Size: Adult Large)   Pulse 74   Resp 16   Wt 121.1 kg (267 lb)   SpO2 98%   BMI 36.21 kg/m      General Appearance:   Awake, Alert, No acute distress.   HEENT:  Pupil equal, reactive to light. No scleral icterus; the mucous membranes were moist. No oral ulcers or thrush.    Neck: No cervical bruits. No JVD. No thyromegaly. No lymph node enlargement or tenderness.   Chest: The spine was straight. The chest was symmetric.   Lungs:   Respirations unlabored. Lungs are clear to auscultation. No crackles. No wheezing.   Cardiovascular:   Irregularly irregular, normal rate, normal first and second heart sounds with no murmurs. No rubs or gallops.    Abdomen:  Obese. Soft. No tenderness. Non-distended. Bowels sounds are present   Extremities: Equal posterior tibial pulses. Trace leg edema.   Skin: No rashes or ulcers. Warm, Dry.   Musculoskeletal: No tenderness. No deformity.   Neurologic: Mood and affect are appropriate. No focal deficits.         EKG:  Personally reivewed  Atrial fibrillation with RVR  No previous ECG for comparison    Cardiac Imaging Studies  ECHO on 1-8-2024:  The left ventricle is normal in size.  There is moderate concentric left ventricular hypertrophy.  The  "visual ejection fraction is 40-45%.  There is mild-moderate global hypokinesia of the left ventricle.  The right ventricle is mildly dilated.  Moderately decreased right ventricular systolic function  The left atrium is severely dilated.  The right ventricular systolic pressure is approximated at 30mmHg plus the  right atrial pressure.  Ascending Aorta dilatation is present.  The rhythm was rapid atrial fibrillation.  There is no comparison study available.    Lab Review   Lab Results   Component Value Date     01/03/2024    CO2 27 01/03/2024    BUN 13.6 01/03/2024     No results found for: \"WBC\", \"HGB\", \"HCT\", \"MCV\", \"PLT\"  Lab Results   Component Value Date    CHOL 124 01/03/2024    TRIG 66 01/03/2024    HDL 44 01/03/2024    LDL 67 01/03/2024     LDL Cholesterol Calculated   Date Value Ref Range Status   01/03/2024 67 <=100 mg/dL Final                   Thank you for allowing me to participate in the care of your patient.      Sincerely,     Hedy Landrum MD     [facility]  cc:   Hedy Landrum MD  [facility]  [place] [address]      "

## 2024-07-19 ENCOUNTER — OFFICE VISIT (OUTPATIENT)
Dept: SLEEP MEDICINE | Facility: CLINIC | Age: 84
End: 2024-07-19
Attending: INTERNAL MEDICINE
Payer: COMMERCIAL

## 2024-07-19 VITALS
SYSTOLIC BLOOD PRESSURE: 104 MMHG | BODY MASS INDEX: 36.31 KG/M2 | HEART RATE: 77 BPM | DIASTOLIC BLOOD PRESSURE: 69 MMHG | HEIGHT: 72 IN | WEIGHT: 268.1 LBS | OXYGEN SATURATION: 97 %

## 2024-07-19 DIAGNOSIS — I48.19 PERSISTENT ATRIAL FIBRILLATION (H): ICD-10-CM

## 2024-07-19 DIAGNOSIS — E66.01 MORBID OBESITY (H): ICD-10-CM

## 2024-07-19 DIAGNOSIS — I50.21 ACUTE SYSTOLIC CONGESTIVE HEART FAILURE (H): ICD-10-CM

## 2024-07-19 DIAGNOSIS — G47.33 OSA (OBSTRUCTIVE SLEEP APNEA): Primary | ICD-10-CM

## 2024-07-19 PROBLEM — R97.20 ABNORMAL PROSTATE SPECIFIC ANTIGEN (PSA): Status: ACTIVE | Noted: 2024-07-19

## 2024-07-19 PROBLEM — N39.3 MALE URINARY STRESS INCONTINENCE: Status: ACTIVE | Noted: 2024-07-19

## 2024-07-19 PROBLEM — Z92.3 HISTORY OF RADIATION THERAPY: Status: ACTIVE | Noted: 2024-07-19

## 2024-07-19 PROBLEM — Z63.4 DISAPPEARANCE AND DEATH OF FAMILY MEMBER: Status: ACTIVE | Noted: 2024-07-19

## 2024-07-19 PROCEDURE — 99204 OFFICE O/P NEW MOD 45 MIN: CPT | Performed by: NURSE PRACTITIONER

## 2024-07-19 RX ORDER — ZOLPIDEM TARTRATE 10 MG/1
TABLET ORAL
Qty: 1 TABLET | Refills: 0 | Status: SHIPPED | OUTPATIENT
Start: 2024-07-19

## 2024-07-19 ASSESSMENT — SLEEP AND FATIGUE QUESTIONNAIRES
HOW LIKELY ARE YOU TO NOD OFF OR FALL ASLEEP WHILE SITTING INACTIVE IN A PUBLIC PLACE: WOULD NEVER DOZE
HOW LIKELY ARE YOU TO NOD OFF OR FALL ASLEEP WHILE SITTING AND READING: WOULD NEVER DOZE
HOW LIKELY ARE YOU TO NOD OFF OR FALL ASLEEP WHILE SITTING AND TALKING TO SOMEONE: WOULD NEVER DOZE
HOW LIKELY ARE YOU TO NOD OFF OR FALL ASLEEP IN A CAR, WHILE STOPPED FOR A FEW MINUTES IN TRAFFIC: WOULD NEVER DOZE
HOW LIKELY ARE YOU TO NOD OFF OR FALL ASLEEP WHILE LYING DOWN TO REST IN THE AFTERNOON WHEN CIRCUMSTANCES PERMIT: SLIGHT CHANCE OF DOZING
HOW LIKELY ARE YOU TO NOD OFF OR FALL ASLEEP WHEN YOU ARE A PASSENGER IN A CAR FOR AN HOUR WITHOUT A BREAK: WOULD NEVER DOZE
HOW LIKELY ARE YOU TO NOD OFF OR FALL ASLEEP WHILE WATCHING TV: SLIGHT CHANCE OF DOZING
HOW LIKELY ARE YOU TO NOD OFF OR FALL ASLEEP WHILE SITTING QUIETLY AFTER LUNCH WITHOUT ALCOHOL: WOULD NEVER DOZE

## 2024-07-19 NOTE — PATIENT INSTRUCTIONS
"          MY TREATMENT INFORMATION FOR SLEEP APNEA-  aRvin Swanson    DOCTOR : DARLENE Shen CNP    Am I having a sleep study at a sleep center?  --->Due to normal delays, you will be contacted within 2-4 weeks to schedule    Am I having a home sleep study?  --->Watch the video for the device you are using:    -/drop off device-   https://www.[place].com/watch?v=yGGFBdELGhk    -Disposable device sent out require phone/computer application-   https://www.[place].com/watch?v=BCce_vbiwxE      Frequently asked questions:  1. What is Obstructive Sleep Apnea (WALTER)? WALTER is the most common type of sleep apnea. Apnea means, \"without breath.\"  Apnea is most often caused by narrowing or collapse of the upper airway as muscles relax during sleep.   Almost everyone has occasional apneas. Most people with sleep apnea have had brief interruptions at night frequently for many years.  The severity of sleep apnea is related to how frequent and severe the events are.   2. What are the consequences of WALTER? Symptoms include: feeling sleepy during the day, snoring loudly, gasping or stopping of breathing, trouble sleeping, and occasionally morning headaches or heartburn at night.  Sleepiness can be serious and even increase the risk of falling asleep while driving. Other health consequences may include development of high blood pressure and other cardiovascular disease in persons who are susceptible. Untreated WALTER  can contribute to heart disease, stroke and diabetes.   3. What are the treatment options? In most situations, sleep apnea is a lifelong disease that must be managed with daily therapy. Medications are not effective for sleep apnea and surgery is generally not considered until other therapies have been tried. Your treatment is your choice . Continuous Positive Airway (CPAP) works right away and is the therapy that is effective in nearly everyone. An oral device to hold your jaw forward is usually the next " most reliable option. Other options include postioning devices (to keep you off your back), weight loss, and surgery including a tongue pacing device. There is more detail about some of these options below.  4. Are my sleep studies covered by insurance? Although we will request verification of coverage, we advise you also check in advance of the study to ensure there is coverage.    Important tips for those choosing CPAP and similar devices  REMEMBER-IF YOU RECEIVE A CALL FROM  [phone]-->IT IS TO SETUP A DEVICE  For new devices, sign up for device JAIR to monitor your device for your followup visits  We encourage you to utilize the PPI jair or website ( https://Cellectis.DotBlu/ ) to monitor your therapy progress and share the data with your healthcare team when you discuss your sleep apnea.                                                    Know your equipment:  CPAP is continuous positive airway pressure that prevents obstructive sleep apnea by keeping the throat from collapsing while you are sleeping. In most cases, the device is  smart  and can slowly self-adjusts if your throat collapses and keeps a record every day of how well you are treated-this information is available to you and your care team.  BPAP is bilevel positive airway pressure that keeps your throat open and also assists each breath with a pressure boost to maintain adequate breathing.  Special kinds of BPAP are used in patients who have inadequate breathing from lung or heart disease. In most cases, the device is  smart  and can slowly self-adjusts to assist breathing. Like CPAP, the device keeps a record of how well you are treated.  Your mask is your connection to the device. You get to choose what feels most comfortable and the staff will help to make sure if fits. Here: are some examples of the different masks that are available: Magnetic mask aids may assist with use but there are safety issues that should be addressed when  considering with magnets* ( see end of discussion).       Key points to remember on your journey with sleep apnea:  Sleep study.  PAP devices often need to be adjusted during a sleep study to show that they are effective and adjusted right.  Good tips to remember: Try wearing just the mask during a quiet time during the day so your body adapts to wearing it. A humidifier is recommended for comfort in most cases to prevent drying of your nose and throat. Allergy medication from your provider may help you if you are having nasal congestion.  Getting settled-in. It takes more than one night for most of us to get used to wearing a mask. Try wearing just the mask during a quiet time during the day so your body adapts to wearing it. A humidifier is recommended for comfort in most cases. Our team will work with you carefully on the first day and will be in contact within 4 days and again at 2 and 4 weeks for advice and remote device adjustments. Your therapy is evaluated by the device each day.   Use it every night. The more you are able to sleep naturally for 7-8 hours, the more likely you will have good sleep and to prevent health risks or symptoms from sleep apnea. Even if you use it 4 hours it helps. Occasionally all of us are unable to use a medical therapy, in sleep apnea, it is not dangerous to miss one night.   Communicate. Call our skilled team on the number provided on the first day if your visit for problems that make it difficult to wear the device. Over 2 out of 3 patients can learn to wear the device long-term with help from our team. Remember to call our team or your sleep providers if you are unable to wear the device as we may have other solutions for those who cannot adapt to mask CPAP therapy. It is recommended that you sleep your sleep provider within the first 3 months and yearly after that if you are not having problems.   Use it for your health. We encourage use of CPAP masks during daytime quiet  periods to allow your face and brain to adapt to the sensation of CPAP so that it will be a more natural sensation to awaken to at night or during naps. This can be very useful during the first few weeks or months of adapting to CPAP though it does not help medically to wear CPAP during wakefulness and  should not be used as a strategy just to meet guidelines.  Take care of your equipment. Make sure you clean your mask and tubing using directions every day and that your filter and mask are replaced as recommended or if they are not working.     *Masks with magnets:  Updated Contraindications  Masks with magnetic components are contraindicated for use by patients where they, or anyone in close physical contact while using the mask, have the following:   Active medical implants that interact with magnets (i.e., pacemakers, implantable cardioverter defibrillators (ICD), neurostimulators, cerebrospinal fluid (CSF) shunts, insulin/infusion pumps)   Metallic implants/objects containing ferromagnetic material (i.e., aneurysm clips/flow disruption devices, embolic coils, stents, valves, electrodes, implants to restore hearing or balance with implanted magnets, ocular implants, metallic splinters in the eye)  Updated Warning  Keep the mask magnets at a safe distance of at least 6 inches (150 mm) away from implants or medical devices that may be adversely affected by magnetic interference. This warning applies to you or anyone in close physical contact with your mask. The magnets are in the frame and lower headgear clips, with a magnetic field strength of up to 400mT. When worn, they connect to secure the mask but may inadvertently detach while asleep.  Implants/medical devices, including those listed within contraindications, may be adversely affected if they change function under external magnetic fields or contain ferromagnetic materials that attract/repel to magnetic fields (some metallic implants, e.g., contact lenses  with metal, dental implants, metallic cranial plates, screws, lakesha hole covers, and bone substitute devices). Consult your physician and  of your implant / other medical device for information on the potential adverse effects of magnetic fields.    BESIDES CPAP, WHAT OTHER THERAPIES ARE THERE?    Positioning Device  Positioning devices are generally used when sleep apnea is mild and only occurs on your back.This example shows a pillow that straps around the waist. It may be appropriate for those whose sleep study shows milder sleep apnea that occurs primarily when lying flat on one's back. Preliminary studies have shown benefit but effectiveness at home may need to be verified by a home sleep test. These devices are generally not covered by medical insurance.  Examples of devices that maintain sleeping on the back to prevent snoring and mild sleep apnea.    Belt type body positioner  http://Vird[employer] Software/    Electronic reminder  http://nightshifttherapy.ShareMeister/            Oral Appliance  What is oral appliance therapy?  An oral appliance device fits on your teeth at night like a retainer used after having braces. The device is made by a specialized dentist and requires several visits over 1-2 months before a manufactured device is made to fit your teeth and is adjusted to prevent your sleep apnea. Once an oral device is working properly, snoring should be improved. A home sleep test may be recommended at that time if to determine whether the sleep apnea is adequately treated.       Some things to remember:  -Oral devices are often, but not always, covered by your medical insurance. Be sure to check with your insurance provider.   -If you are referred for oral therapy, you will be given a list of specialized dentists to consider or you may choose to visit the Web site of the American Academy of Dental Sleep Medicine  -Oral devices are less likely to work if you have severe sleep apnea or are extremely  overweight.     More detailed information  An oral appliance is a small acrylic device that fits over the upper and lower teeth  (similar to a retainer or a mouth guard). This device slightly moves jaw forward, which moves the base of the tongue forward, opens the airway, improves breathing for effective treat snoring and obstructive sleep apnea in perhaps 7 out of 10 people .  The best working devices are custom-made by a dental device  after a mold is made of the teeth 1, 2, 3.  When is an oral appliance indicated?  Oral appliance therapy is recommended as a first-line treatment for patients with primary snoring, mild sleep apnea, and for patients with moderate sleep apnea who prefer appliance therapy to use of CPAP4, 5. Severity of sleep apnea is determined by sleep testing and is based on the number of respiratory events per hour of sleep.   How successful is oral appliance therapy?  The success rate of oral appliance therapy in patients with mild sleep apnea is 75-80% while in patients with moderate sleep apnea it is 50-70%. The chance of success in patients with severe sleep apnea is 40-50%. The research also shows that oral appliances have a beneficial effect on the cardiovascular health of WALTER patients at the same magnitude as CPAP therapy7.  Oral appliances should be a second-line treatment in cases of severe sleep apnea, but if not completely successful then a combination therapy utilizing CPAP plus oral appliance therapy may be effective. Oral appliances tend to be effective in a broad range of patients although studies show that the patients who have the highest success are females, younger patients, those with milder disease, and less severe obesity. 3, 6.   Finding a dentist that practices dental sleep medicine  Specific training is available through the American Academy of Dental Sleep Medicine for dentists interested in working in the field of sleep. To find a dentist who is educated in  the field of sleep and the use of oral appliances, near you, visit the Web site of the American Academy of Dental Sleep Medicine.    References  1. Di, et al. Objectively measured vs self-reported compliance during oral appliance therapy for sleep-disordered breathing. Chest 2013; 144(5): 5215-1218.  2. Luis et al. Objective measurement of compliance during oral appliance therapy for sleep-disordered breathing. Thorax 2013; 68(1): 91-96.  3. Ayah et al. Mandibular advancement devices in 620 men and women with WALTER and snoring: tolerability and predictors of treatment success. Chest 2004; 125: 9633-9980.  4. Saleem, et al. Oral appliances for snoring and WALTER: a review. Sleep 2006; 29: 244-262.  5. Becky et al. Oral appliance treatment for WALTER: an update. J Clin Sleep Med 2014; 10(2): 215-227.  6. Arnold et al. Predictors of OSAH treatment outcome. J Dent Res 2007; 86: 7287-4494.      Weight Loss:   Your Body mass index is 36.35 kg/m .    Being overweight does not necessarily mean you will have health consequences.  Those who have BMI over 35 or over 27 with existing medical conditions carries greater risk.   Weight loss decreases severity of sleep apnea in most people with obesity. For those with mild obesity who have developed snoring with weight gain, even 15-30 pound weight loss can improve and occasionally milder eliminate sleep apnea.  Structured and life-long dietary and health habits are necessary to lose weight and keep healthier weight levels.     The Comprehensive Weight loss program offers all aspects of weight loss strategies including two Non-Surgical Weight Loss Programs: Medical Weight Management and our 24 Week Healthy Lifestyle Program:    Medical Weight Management: You will meet with a Medical Weight Management Provider, as well as a Registered Dietician. The program may include medication therapy, dietary education, recommended exercise and physical therapy programs,  monthly support group meetings, and possible psychological counseling. Follow up visits with the provider or dietician are scheduled based on your progress and needs.    24 Week Healthy Lifestyle Program: This unique program is designed to give you the support of weekly appointments and activities thru a 24-week period. It may include all of the components of the basic program (above), with the addition of 11 individual Health  Visits, 24-week access to the [facility] website for over 700 online classes, and monthly support group meetings. This program has an out-of-pocket expense of $499 to cover the items that can not be billed to insurance (health coaches and [facility] access), and is non-refundable/non-transferable (you may be able to use a Health Savings Account; ask your HSA provider). There may be an optional meal replacement plan prescribed as well.   Surgical management achieves meaningful long-term weight loss and improvement in health risks in most patients with more severe obesity.      Sleep Apnea Surgery:    Surgery for obstructive sleep apnea is considered generally only when other therapies fail to work. Surgery may be discussed with you if you are having a difficult time tolerating CPAP and or when there is an abnormal structure that requires surgical correction.  Nose and throat surgeries often enlarge the airway to prevent collapse.  Most of these surgeries create pain for 1-2 weeks and up to half of the most common surgeries are not effective throughout life.  You should carefully discuss the benefits and drawbacks to surgery with your sleep provider and surgeon to determine if it is the best solution for you.   More information  Surgery for WALTER is directed at areas that are responsible for narrowing or complete obstruction of the airway during sleep.  There are a wide range of procedures available to enlarge and/or stabilize the airway to prevent blockage of breathing in the three major  areas where it can occur: the palate, tongue, and nasal regions.  Successful surgical treatment depends on the accurate identification of the factors responsible for obstructive sleep apnea in each person.  A personalized approach is required because there is no single treatment that works well for everyone.  Because of anatomic variation, consultation with an examination by a sleep surgeon is a critical first step in determining what surgical options are best for each patient.  In some cases, examination during sedation may be recommended in order to guide the selection of procedures.  Patients will be counseled about risks and benefits as well as the typical recovery course after surgery. Surgery is typically not a cure for a person s WALTER.  However, surgery will often significantly improve one s WALTER severity (termed  success rate ).  Even in the absence of a cure, surgery will decrease the cardiovascular risk associated with OSA7; improve overall quality of life8 (sleepiness, functionality, sleep quality, etc).      Palate Procedures:  Patients with WALTER often have narrowing of their airway in the region of their tonsils and uvula.  The goals of palate procedures are to widen the airway in this region as well as to help the tissues resist collapse.  Modern palate procedure techniques focus on tissue conservation and soft tissue rearrangement, rather than tissue removal.  Often the uvula is preserved in this procedure. Residual sleep apnea is common in patient after pharyngoplasty with an average reduction in sleep apnea events of 33%2.      Tongue Procedures:  ExamWhile patients are awake, the muscles that surround the throat are active and keep this region open for breathing. These muscles relax during sleep, allowing the tongue and other structures to collapse and block breathing.  There are several different tongue procedures available.  Selection of a tongue base procedure depends on characteristics seen on  physical exam.  Generally, procedures are aimed at removing bulky tissues in this area or preventing the back of the tongue from falling back during sleep.  Success rates for tongue surgery range from 50-62%3.    Hypoglossal Nerve Stimulation:  Hypoglossal nerve stimulation has recently received approval from the [place] Food and Drug Administration for the treatment of obstructive sleep apnea.  This is based on research showing that the system was safe and effective in treating sleep apnea6.  Results showed that the median AHI score decreased 68%, from 29.3 to 9.0. This therapy uses an implant system that senses breathing patterns and delivers mild stimulation to airway muscles, which keeps the airway open during sleep.  The system consists of three fully implanted components: a small generator (similar in size to a pacemaker), a breathing sensor, and a stimulation lead.  Using a small handheld remote, a patient turns the therapy on before bed and off upon awakening.    Candidates for this device must be greater than 18 years of age, have moderate to severe obstructive sleep apnea with less than 25% central events  (AHI between 15-65), BMI less than 35, have tried CPAP/oral appliance for at least 8 weeks without success, and have appropriate upper airway anatomy (determined by a sleep endoscopy performed by Dr. Alirio Bobby or Dr. Randy Williamson).    Nasal Procedures:  Nasal obstruction can interfere with nasal breathing during the day and night.  Studies have shown that relief of nasal obstruction can improve the ability of some patients to tolerate positive airway pressure therapy for obstructive sleep apnea1.  Treatment options include medications such as nasal saline, topical corticosteroid and antihistamine sprays, and oral medications such as antihistamines or decongestants. Non-surgical treatments can include external nasal dilators for selected patients. If these are not successful by themselves,  surgery can improve the nasal airway either alone or in combination with these other options.        Combination Procedures:  Combination of surgical procedures and other treatments may be recommended, particularly if patients have more than one area of narrowing or persistent positional disease.  The success rate of combination surgery ranges from 66-80%2,3.    References  Marshall LOPEZ. The Role of the Nose in Snoring and Obstructive Sleep Apnoea: An Update.  Eur Arch Otorhinolaryngol. 2011; 268: 1365-73.   Peggy SM; Jorge JA; Omayra JR; Pallanch JF; Louis MB; Damon SG; Reji CARO. Surgical modifications of the upper airway for obstructive sleep apnea in adults: a systematic review and meta-analysis. SLEEP 2010;33(10):9921-5406. Mary WICK. Hypopharyngeal surgery in obstructive sleep apnea: an evidence-based medicine review.  Arch Otolaryngol Head Neck Surg. 2006 Feb;132(2):206-13.  Nicolas YH1, Florentin Y, Kip AMADA. The efficacy of anatomically based multilevel surgery for obstructive sleep apnea. Otolaryngol Head Neck Surg. 2003 Oct;129(4):327-35.  Mary WICK, Goldberg A. Hypopharyngeal Surgery in Obstructive Sleep Apnea: An Evidence-Based Medicine Review. Arch Otolaryngol Head Neck Surg. 2006 Feb;132(2):206-13.  Artis QUICK et al. Upper-Airway Stimulation for Obstructive Sleep Apnea.  N Engl J Med. 2014 Jan 9;370(2):139-49.  Annabel Y et al. Increased Incidence of Cardiovascular Disease in Middle-aged Men with Obstructive Sleep Apnea. Am J Respir Crit Care Med; 2002 166: 159-165  Hinton EM et al. Studying Life Effects and Effectiveness of Palatopharyngoplasty (SLEEP) study: Subjective Outcomes of Isolated Uvulopalatopharyngoplasty. Otolaryngol Head Neck Surg. 2011; 144: 623-631.        WHAT IF I ONLY HAVE SNORING?    Mandibular advancement devices, lateral sleep positioning, long-term weight loss and treatment of nasal allergies have been shown to improve snoring.  Exercising tongue muscles with a game  (https://Seesaw.Applied StemCell.[employer]/us/jair/soundly-reduce-snoring/vy4294582917) or stimulating the tongue during the day with a device (https://doi.org/10.3390/bnj54957105) have improved snoring in some individuals.  https://www.Knack.it.[employer]/  https://www.sleepfoundation.org/best-anti-snoring-mouthpieces-and-mouthguards    Remember to Drive Safe... Drive Alive     Sleep health profoundly affects your health, mood, and your safety.  Thirty three percent of the population (one in three of us) is not getting enough sleep and many have a sleep disorder. Not getting enough sleep or having an untreated / undertreated sleep condition may make us sleepy without even knowing it. In fact, our driving could be dramatically impaired due to our sleep health. As your provider, here are some things I would like you to know about driving:     Here are some warning signs for impairment and dangerous drowsy driving:              -Having been awake more than 16 hours               -Looking tired               -Eyelid drooping              -Head nodding (it could be too late at this point)              -Driving for more than 30 minutes     Some things you could do to make the driving safer if you are experiencing some drowsiness:              -Stop driving and rest              -Call for transportation              -Make sure your sleep disorder is adequately treated     Some things that have been shown NOT to work when experiencing drowsiness while driving:              -Turning on the radio              -Opening windows              -Eating any  distracting  /  entertaining  foods (e.g., sunflower seeds, candy, or any other)              -Talking on the phone      Your decision may not only impact your life, but also the life of others. Please, remember to drive safe for yourself and all of us.
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
## 2024-08-08 NOTE — PROGRESS NOTES
"        Assessment/Recommendations   Assessment:    1.  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    Current home weight is stable at 278 lbs- gradual weight gain in the last 1 month.  Weight is up approximately 16 pounds.  Patient thinks his weight gain is due to increased calorie intake.    Patient is hypervolemic on exam.  He continues to have shortness of breath walking up incline.     Heart failure regimen includes:    -Beta-blocker therapy with metoprolol succinate 50 mg daily    -ACE inhibitor with lisinopril 5 mg daily    -Aldosterone blocker therapy with spironolactone 25 mg daily    -Not on SGLT2 Inhibitor     -Diuretic therapy with furosemide 20 mg daily    Stable lab in June 2024 except creatinine was mildly elevated 1.24.  Previously 1.23.    2.  Persistent Atrial fibrillation: Heart rate in 80's  On Xarelto for stroke prophylaxis.    3.  Morbid obesity with BMI of 36.88: He exercises 5-6 times a week.    4.  Possible obstructive sleep apnea: Patient had a sleep evaluation consultation.  He has sleep study due in December.    Plan/Recommendation:  -BMP pending  -Will adjust diuretic therapy once lab results back  -Cardiac MRI as scheduled in August.    He will follow-up with Dr. Landrum in January 2024.  Follow-up with me in 2 months     The longitudinal plan of care for cardiomyopathy likely tachycardia mediated, acute systolic heart failure was addressed during this visit.?Due to the added   complexity in care, I will continue to support Ravin Swanson in the subsequent management of this   condition(s) and with the ongoing continuity of care of this condition(s)\".     History of Present Illness/Subjective    Mr. Ravin Swanson is a 84 year old male with a past medical history of morbid obesity, COVID infection in November 2023, dyspnea on exertion, and tachycardia mediated cardiomyopathy, persistent " atrial fibrillation and chronic systolic heart failure who is seen at [facility] for continued heart failure follow-up.    During last heart failure clinic visit, patient was found mild hypervolemic on exam.  His spironolactone and furosemide dosage were increased.    He saw Dr. Landrum in July 2024- note reviewed    Today, Ravin reports about 16 pounds weight gain more so in the last 1 month which he thinks is due to increased calorie intake as he has been eating more when his sister was in town.  He continues to have shortness of breath on exertion specially walking up incline which is unchanged from baseline.  He has fatigue.  He noticed some swelling in his legs.  He denies lightheadedness, shortness of breath, orthopnea, PND, palpitations, chest pain, and abdominal fullness/bloating.  He denies bleeding complications.    He continues to go to gym and workout 5-6 times a week for about 45 minutes at a time.    He thinks he is following low-sodium diet.  He reports adequate fluid intake.    ECHO from 1/8/24-Reviewed:   Interpretation Summary   The left ventricle is normal in size.  There is moderate concentric left ventricular hypertrophy.  The visual ejection fraction is 40-45%.  There is mild-moderate global hypokinesia of the left ventricle.  The right ventricle is mildly dilated.  Moderately decreased right ventricular systolic function  The left atrium is severely dilated.  The right ventricular systolic pressure is approximated at 30mmHg plus the  right atrial pressure.  Ascending Aorta dilatation is present.  The rhythm was rapid atrial fibrillation.  There is no comparison study available.     Physical Examination Review of Systems   /68 (BP Location: Left arm, Patient Position: Sitting, Cuff Size: Adult Large)   Pulse 88   Resp 16   Wt 123.4 kg (272 lb)   BMI 36.88 kg/m    Body mass index is 36.88 kg/m .  Wt Readings from Last 3 Encounters:   08/09/24 123.4 kg  (272 lb)   24 121.6 kg (268 lb 1.6 oz)   24 121.1 kg (267 lb)     General Appearance:   no distress, normal body habitus   ENT/Mouth: membranes moist, no oral lesions or bleeding gums.      EYES:  no scleral icterus, normal conjunctivae   Neck: no carotid bruits or thyromegaly   Chest/Lungs:   lungs are clear to auscultation, no rales or wheezing, equal chest wall expansion except noted crackles in bilateral bases   Cardiovascular:   Irregularly irregular; Normal first and second heart sounds with no murmurs, rubs, or gallops; the carotid, radial and posterior tibial pulses are intact, JVP is difficult to assess due to the patient's obesity and body habitus   , mild edema bilaterally    Abdomen:  Large but soft; organomegaly, masses, bruits, or tenderness; bowel sounds are present   Extremities   no cyanosis or clubbing; CMS intact   Skin: no xanthelasma, warm.    Neurologic: normal  bilateral, no tremors   Psychiatric: alert and oriented x3, calm                                                    Negative unless noted in HPI     Medical History  Surgical History Family History Social History   Past Medical History:   Diagnosis Date    Hypertension     Past Surgical History:   Procedure Laterality Date    COLONOSCOPY      COLONOSCOPY N/A 5/3/2024    Procedure: COLONOSCOPY with POLYPECTOMY;  Surgeon: Barry Hayes MD;  Location: [facility] OR    GENITOURINARY SURGERY      No family history on file. Social History     Socioeconomic History    Marital status:      Spouse name: Not on file    Number of children: Not on file    Years of education: Not on file    Highest education level: Not on file   Occupational History    Not on file   Tobacco Use    Smoking status: Former     Current packs/day: 0.00     Types: Cigarettes     Quit date:      Years since quittin.6    Smokeless tobacco: Never   Vaping Use    Vaping status: Never Used   Substance and Sexual Activity    Alcohol  "use: Yes     Comment: minimal    Drug use: Never    Sexual activity: Not on file   Other Topics Concern    Not on file   Social History Narrative    Not on file     Social Determinants of Health     Financial Resource Strain: Not on file   Food Insecurity: Not on file   Transportation Needs: Not on file   Physical Activity: Not on file   Stress: Not on file   Social Connections: Not on file   Interpersonal Safety: Not on file   Housing Stability: Not on file          Medications  Allergies   Current Outpatient Medications   Medication Sig Dispense Refill    furosemide (LASIX) 20 MG tablet Take 1 tablet (20 mg) by mouth daily 3090 tablet 3    lisinopril (ZESTRIL) 5 MG tablet Take 1 tablet (5 mg) by mouth daily 90 tablet 3    metoprolol succinate ER (TOPROL XL) 50 MG 24 hr tablet Take 1 tablet (50 mg) by mouth daily 90 tablet 3    rivaroxaban ANTICOAGULANT (XARELTO) 20 MG TABS tablet Take 1 tablet (20 mg) by mouth daily (with dinner) 30 tablet 11    spironolactone (ALDACTONE) 25 MG tablet Take 1 tablet (25 mg) by mouth daily 90 tablet 3    XHANCE 93 MCG/ACT nasal spray Spray into both nostrils 2 times daily      zolpidem (AMBIEN) 10 MG tablet Take tablet by mouth 15 minutes prior to sleep, for Sleep Study 1 tablet 0    No Known Allergies      Lab Results    Chemistry/lipid CBC Cardiac Enzymes/BNP/TSH/INR   Lab Results   Component Value Date    CHOL 124 01/03/2024    HDL 44 01/03/2024    TRIG 66 01/03/2024    BUN 27.1 (H) 06/04/2024     06/04/2024    CO2 25 06/04/2024    No results found for: \"WBC\", \"HGB\", \"HCT\", \"MCV\", \"PLT\" No results found for: \"CKTOTAL\", \"CKMB\", \"TROPONINI\", \"BNP\", \"TSH\", \"INR\"     30 minutes spent on the date of encounter doing chart review, review of test results, interpretation with above tests, patient visit, and documentation.        This note has been dictated using voice recognition software. Any grammatical, typographical, or context distortions are unintentional and inherent to the " software

## 2024-08-09 ENCOUNTER — OFFICE VISIT (OUTPATIENT)
Dept: CARDIOLOGY | Facility: CLINIC | Age: 84
End: 2024-08-09
Attending: NURSE PRACTITIONER
Payer: COMMERCIAL

## 2024-08-09 ENCOUNTER — TELEPHONE (OUTPATIENT)
Dept: CARDIOLOGY | Facility: CLINIC | Age: 84
End: 2024-08-09

## 2024-08-09 VITALS
HEART RATE: 88 BPM | RESPIRATION RATE: 16 BRPM | WEIGHT: 272 LBS | BODY MASS INDEX: 36.88 KG/M2 | DIASTOLIC BLOOD PRESSURE: 68 MMHG | SYSTOLIC BLOOD PRESSURE: 130 MMHG

## 2024-08-09 DIAGNOSIS — R00.0 TACHYCARDIA INDUCED CARDIOMYOPATHY (H): ICD-10-CM

## 2024-08-09 DIAGNOSIS — I50.21 ACUTE SYSTOLIC CONGESTIVE HEART FAILURE (H): Primary | ICD-10-CM

## 2024-08-09 DIAGNOSIS — G47.33 OSA (OBSTRUCTIVE SLEEP APNEA): ICD-10-CM

## 2024-08-09 DIAGNOSIS — I43 TACHYCARDIA INDUCED CARDIOMYOPATHY (H): ICD-10-CM

## 2024-08-09 DIAGNOSIS — I50.22 CHRONIC SYSTOLIC CONGESTIVE HEART FAILURE (H): Primary | ICD-10-CM

## 2024-08-09 DIAGNOSIS — E66.01 MORBID OBESITY (H): ICD-10-CM

## 2024-08-09 DIAGNOSIS — I48.19 PERSISTENT ATRIAL FIBRILLATION (H): ICD-10-CM

## 2024-08-09 LAB
ANION GAP SERPL CALCULATED.3IONS-SCNC: 11 MMOL/L (ref 7–15)
BUN SERPL-MCNC: 18.8 MG/DL (ref 8–23)
CALCIUM SERPL-MCNC: 8.7 MG/DL (ref 8.8–10.4)
CHLORIDE SERPL-SCNC: 103 MMOL/L (ref 98–107)
CREAT SERPL-MCNC: 1.08 MG/DL (ref 0.67–1.17)
EGFRCR SERPLBLD CKD-EPI 2021: 68 ML/MIN/1.73M2
GLUCOSE SERPL-MCNC: 88 MG/DL (ref 70–99)
HCO3 SERPL-SCNC: 25 MMOL/L (ref 22–29)
POTASSIUM SERPL-SCNC: 4.3 MMOL/L (ref 3.4–5.3)
SODIUM SERPL-SCNC: 139 MMOL/L (ref 135–145)

## 2024-08-09 PROCEDURE — G2211 COMPLEX E/M VISIT ADD ON: HCPCS | Performed by: NURSE PRACTITIONER

## 2024-08-09 PROCEDURE — 36415 COLL VENOUS BLD VENIPUNCTURE: CPT | Performed by: NURSE PRACTITIONER

## 2024-08-09 PROCEDURE — 99214 OFFICE O/P EST MOD 30 MIN: CPT | Performed by: NURSE PRACTITIONER

## 2024-08-09 PROCEDURE — 80048 BASIC METABOLIC PNL TOTAL CA: CPT | Performed by: NURSE PRACTITIONER

## 2024-08-09 RX ORDER — FUROSEMIDE 20 MG
40 TABLET ORAL DAILY
Qty: 180 TABLET | Refills: 1 | Status: SHIPPED | OUTPATIENT
Start: 2024-08-09

## 2024-08-09 NOTE — PATIENT INSTRUCTIONS
Ravin Swanson,    It was a pleasure to see you today at the [facility].     My recommendations after this visit include:    - No medications changes made today    - We will follow up with your lab result    - Schedule Cardiac MRI    - Low sodium diet < 2000 mg/day, daily weight monitoring, and maintain adequate fluid intake at 50 to 60 ounces per day    -Please call if you experience persistent weight gain 2 to 3 pounds 2 days in a row or 5 pounds in a week with shortness of breath, abdominal bloating and leg swelling    - Follow up with Shalom in 2 months     - Follow up with Dr. Landrum in January 2024    - Please call Heart Failure Nurse Line at [phone], if you have any questions or concerns

## 2024-08-09 NOTE — TELEPHONE ENCOUNTER
Spoke w/ pt and updated him w/ lab results and recommendations from Shalom. Pt verbalized understanding. Will put rec's in MyChart message. Pt will go to the OP lab at [facility] on Tues 8/13 for BMP. Orders updated.     SATNAM Acevedo RN

## 2024-08-09 NOTE — TELEPHONE ENCOUNTER
----- Message from Shalom Chi sent at 8/9/2024  2:18 PM CDT -----  Stable lab  Increase Furosemide from 20 mg daily to 40 mg daily.  Repeat BMP on 8/13/24.  Thank you!  CY

## 2024-08-09 NOTE — LETTER
"8/9/2024    Ravin Dockery MD  [address] [place] [address]    RE: Ravin Swanson       Dear Colleague,     I had the pleasure of seeing Ravin Swanson in the [facility].          Assessment/Recommendations   Assessment:    1.  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    Current home weight is stable at 278 lbs- gradual weight gain in the last 1 month.  Weight is up approximately 16 pounds.  Patient thinks his weight gain is due to increased calorie intake.    Patient is hypervolemic on exam.  He continues to have shortness of breath walking up incline.     Heart failure regimen includes:    -Beta-blocker therapy with metoprolol succinate 50 mg daily    -ACE inhibitor with lisinopril 5 mg daily    -Aldosterone blocker therapy with spironolactone 25 mg daily    -Not on SGLT2 Inhibitor     -Diuretic therapy with furosemide 20 mg daily    Stable lab in June 2024 except creatinine was mildly elevated 1.24.  Previously 1.23.    2.  Persistent Atrial fibrillation: Heart rate in 80's  On Xarelto for stroke prophylaxis.    3.  Morbid obesity with BMI of 36.88: He exercises 5-6 times a week.    4.  Possible obstructive sleep apnea: Patient had a sleep evaluation consultation.  He has sleep study due in December.    Plan/Recommendation:  -BMP pending  -Will adjust diuretic therapy once lab results back  -Cardiac MRI as scheduled in August.    He will follow-up with Dr. Landrum in January 2024.  Follow-up with me in 2 months     The longitudinal plan of care for cardiomyopathy likely tachycardia mediated, acute systolic heart failure was addressed during this visit.?Due to the added   complexity in care, I will continue to support Ravin Swanson in the subsequent management of this   condition(s) and with the ongoing continuity of care of this condition(s)\".     History of Present " Illness/Subjective    Mr. Ravin Swanson is a 84 year old male with a past medical history of morbid obesity, COVID infection in November 2023, dyspnea on exertion, and tachycardia mediated cardiomyopathy, persistent atrial fibrillation and chronic systolic heart failure who is seen at [facility] for continued heart failure follow-up.    During last heart failure clinic visit, patient was found mild hypervolemic on exam.  His spironolactone and furosemide dosage were increased.    He saw Dr. Landrum in July 2024- note reviewed    Today, Ravin reports about 16 pounds weight gain more so in the last 1 month which he thinks is due to increased calorie intake as he has been eating more when his sister was in town.  He continues to have shortness of breath on exertion specially walking up incline which is unchanged from baseline.  He has fatigue.  He noticed some swelling in his legs.  He denies lightheadedness, shortness of breath, orthopnea, PND, palpitations, chest pain, and abdominal fullness/bloating.  He denies bleeding complications.    He continues to go to gym and workout 5-6 times a week for about 45 minutes at a time.    He thinks he is following low-sodium diet.  He reports adequate fluid intake.    ECHO from 1/8/24-Reviewed:   Interpretation Summary   The left ventricle is normal in size.  There is moderate concentric left ventricular hypertrophy.  The visual ejection fraction is 40-45%.  There is mild-moderate global hypokinesia of the left ventricle.  The right ventricle is mildly dilated.  Moderately decreased right ventricular systolic function  The left atrium is severely dilated.  The right ventricular systolic pressure is approximated at 30mmHg plus the  right atrial pressure.  Ascending Aorta dilatation is present.  The rhythm was rapid atrial fibrillation.  There is no comparison study available.     Physical Examination Review of Systems   /68 (BP Location:  Left arm, Patient Position: Sitting, Cuff Size: Adult Large)   Pulse 88   Resp 16   Wt 123.4 kg (272 lb)   BMI 36.88 kg/m    Body mass index is 36.88 kg/m .  Wt Readings from Last 3 Encounters:   08/09/24 123.4 kg (272 lb)   07/19/24 121.6 kg (268 lb 1.6 oz)   07/09/24 121.1 kg (267 lb)     General Appearance:   no distress, normal body habitus   ENT/Mouth: membranes moist, no oral lesions or bleeding gums.      EYES:  no scleral icterus, normal conjunctivae   Neck: no carotid bruits or thyromegaly   Chest/Lungs:   lungs are clear to auscultation, no rales or wheezing, equal chest wall expansion except noted crackles in bilateral bases   Cardiovascular:   Irregularly irregular; Normal first and second heart sounds with no murmurs, rubs, or gallops; the carotid, radial and posterior tibial pulses are intact, JVP is difficult to assess due to the patient's obesity and body habitus   , mild edema bilaterally    Abdomen:  Large but soft; organomegaly, masses, bruits, or tenderness; bowel sounds are present   Extremities   no cyanosis or clubbing; CMS intact   Skin: no xanthelasma, warm.    Neurologic: normal  bilateral, no tremors   Psychiatric: alert and oriented x3, calm                                                    Negative unless noted in HPI     Medical History  Surgical History Family History Social History   Past Medical History:   Diagnosis Date     Hypertension     Past Surgical History:   Procedure Laterality Date     COLONOSCOPY       COLONOSCOPY N/A 5/3/2024    Procedure: COLONOSCOPY with POLYPECTOMY;  Surgeon: Barry Hayes MD;  Location: [facility] OR     GENITOURINARY SURGERY      No family history on file. Social History     Socioeconomic History     Marital status:      Spouse name: Not on file     Number of children: Not on file     Years of education: Not on file     Highest education level: Not on file   Occupational History     Not on file   Tobacco Use     Smoking  "status: Former     Current packs/day: 0.00     Types: Cigarettes     Quit date:      Years since quittin.6     Smokeless tobacco: Never   Vaping Use     Vaping status: Never Used   Substance and Sexual Activity     Alcohol use: Yes     Comment: minimal     Drug use: Never     Sexual activity: Not on file   Other Topics Concern     Not on file   Social History Narrative     Not on file     Social Determinants of Health     Financial Resource Strain: Not on file   Food Insecurity: Not on file   Transportation Needs: Not on file   Physical Activity: Not on file   Stress: Not on file   Social Connections: Not on file   Interpersonal Safety: Not on file   Housing Stability: Not on file          Medications  Allergies   Current Outpatient Medications   Medication Sig Dispense Refill     furosemide (LASIX) 20 MG tablet Take 1 tablet (20 mg) by mouth daily 3090 tablet 3     lisinopril (ZESTRIL) 5 MG tablet Take 1 tablet (5 mg) by mouth daily 90 tablet 3     metoprolol succinate ER (TOPROL XL) 50 MG 24 hr tablet Take 1 tablet (50 mg) by mouth daily 90 tablet 3     rivaroxaban ANTICOAGULANT (XARELTO) 20 MG TABS tablet Take 1 tablet (20 mg) by mouth daily (with dinner) 30 tablet 11     spironolactone (ALDACTONE) 25 MG tablet Take 1 tablet (25 mg) by mouth daily 90 tablet 3     XHANCE 93 MCG/ACT nasal spray Spray into both nostrils 2 times daily       zolpidem (AMBIEN) 10 MG tablet Take tablet by mouth 15 minutes prior to sleep, for Sleep Study 1 tablet 0    No Known Allergies      Lab Results    Chemistry/lipid CBC Cardiac Enzymes/BNP/TSH/INR   Lab Results   Component Value Date    CHOL 124 2024    HDL 44 2024    TRIG 66 2024    BUN 27.1 (H) 2024     2024    CO2 25 2024    No results found for: \"WBC\", \"HGB\", \"HCT\", \"MCV\", \"PLT\" No results found for: \"CKTOTAL\", \"CKMB\", \"TROPONINI\", \"BNP\", \"TSH\", \"INR\"     30 minutes spent on the date of encounter doing chart review, review of " test results, interpretation with above tests, patient visit, and documentation.        This note has been dictated using voice recognition software. Any grammatical, typographical, or context distortions are unintentional and inherent to the software          Thank you for allowing me to participate in the care of your patient.      Sincerely,     DARLENE Vaughn CNP     [facility]  cc:   DARLENE Vaughn CNP  [address]  [place] [id number]

## 2024-08-13 ENCOUNTER — LAB (OUTPATIENT)
Dept: LAB | Facility: CLINIC | Age: 84
End: 2024-08-13
Payer: COMMERCIAL

## 2024-08-13 DIAGNOSIS — I50.22 CHRONIC SYSTOLIC CONGESTIVE HEART FAILURE (H): ICD-10-CM

## 2024-08-13 LAB
ANION GAP SERPL CALCULATED.3IONS-SCNC: 5 MMOL/L (ref 7–15)
BUN SERPL-MCNC: 18.7 MG/DL (ref 8–23)
CALCIUM SERPL-MCNC: 8.8 MG/DL (ref 8.8–10.4)
CHLORIDE SERPL-SCNC: 105 MMOL/L (ref 98–107)
CREAT SERPL-MCNC: 1.15 MG/DL (ref 0.67–1.17)
EGFRCR SERPLBLD CKD-EPI 2021: 63 ML/MIN/1.73M2
GLUCOSE SERPL-MCNC: 88 MG/DL (ref 70–99)
HCO3 SERPL-SCNC: 28 MMOL/L (ref 22–29)
POTASSIUM SERPL-SCNC: 4.5 MMOL/L (ref 3.4–5.3)
SODIUM SERPL-SCNC: 138 MMOL/L (ref 135–145)

## 2024-08-13 PROCEDURE — 36415 COLL VENOUS BLD VENIPUNCTURE: CPT

## 2024-08-13 PROCEDURE — 82374 ASSAY BLOOD CARBON DIOXIDE: CPT

## 2024-08-20 ENCOUNTER — HOSPITAL ENCOUNTER (OUTPATIENT)
Dept: MRI IMAGING | Facility: HOSPITAL | Age: 84
Discharge: HOME | End: 2024-08-20
Attending: INTERNAL MEDICINE
Payer: COMMERCIAL

## 2024-08-20 VITALS — SYSTOLIC BLOOD PRESSURE: 99 MMHG | HEART RATE: 78 BPM | DIASTOLIC BLOOD PRESSURE: 64 MMHG | OXYGEN SATURATION: 100 %

## 2024-08-20 DIAGNOSIS — I42.9 CARDIOMYOPATHY, UNSPECIFIED TYPE (H): ICD-10-CM

## 2024-08-20 DIAGNOSIS — R06.09 DYSPNEA ON EXERTION: ICD-10-CM

## 2024-08-20 LAB
ATRIAL RATE - MUSE: 258 BPM
ATRIAL RATE - MUSE: 326 BPM
DIASTOLIC BLOOD PRESSURE - MUSE: NORMAL MMHG
DIASTOLIC BLOOD PRESSURE - MUSE: NORMAL MMHG
INTERPRETATION ECG - MUSE: NORMAL
INTERPRETATION ECG - MUSE: NORMAL
P AXIS - MUSE: NORMAL DEGREES
P AXIS - MUSE: NORMAL DEGREES
PR INTERVAL - MUSE: NORMAL MS
PR INTERVAL - MUSE: NORMAL MS
QRS DURATION - MUSE: 90 MS
QRS DURATION - MUSE: 90 MS
QT - MUSE: 380 MS
QT - MUSE: 392 MS
QTC - MUSE: 441 MS
QTC - MUSE: 460 MS
R AXIS - MUSE: -23 DEGREES
R AXIS - MUSE: -27 DEGREES
SYSTOLIC BLOOD PRESSURE - MUSE: NORMAL MMHG
SYSTOLIC BLOOD PRESSURE - MUSE: NORMAL MMHG
T AXIS - MUSE: -4 DEGREES
T AXIS - MUSE: -7 DEGREES
VENTRICULAR RATE- MUSE: 81 BPM
VENTRICULAR RATE- MUSE: 83 BPM

## 2024-08-20 PROCEDURE — 93018 CV STRESS TEST I&R ONLY: CPT | Performed by: INTERNAL MEDICINE

## 2024-08-20 PROCEDURE — 93005 ELECTROCARDIOGRAM TRACING: CPT

## 2024-08-20 PROCEDURE — 93010 ELECTROCARDIOGRAM REPORT: CPT | Performed by: STUDENT IN AN ORGANIZED HEALTH CARE EDUCATION/TRAINING PROGRAM

## 2024-08-20 PROCEDURE — 250N000011 HC RX IP 250 OP 636: Performed by: INTERNAL MEDICINE

## 2024-08-20 PROCEDURE — 93010 ELECTROCARDIOGRAM REPORT: CPT | Mod: 77 | Performed by: INTERNAL MEDICINE

## 2024-08-20 PROCEDURE — 255N000002 HC RX 255 OP 636: Performed by: INTERNAL MEDICINE

## 2024-08-20 PROCEDURE — 93016 CV STRESS TEST SUPVJ ONLY: CPT | Performed by: INTERNAL MEDICINE

## 2024-08-20 PROCEDURE — 75563 CARD MRI W/STRESS IMG & DYE: CPT

## 2024-08-20 PROCEDURE — 75563 CARD MRI W/STRESS IMG & DYE: CPT | Mod: 26 | Performed by: INTERNAL MEDICINE

## 2024-08-20 PROCEDURE — 999N000122 MR MYOCARDIUM  OVERREAD

## 2024-08-20 PROCEDURE — A9585 GADOBUTROL INJECTION: HCPCS | Performed by: INTERNAL MEDICINE

## 2024-08-20 RX ORDER — AMINOPHYLLINE 25 MG/ML
50-100 INJECTION, SOLUTION INTRAVENOUS
Status: DISCONTINUED | OUTPATIENT
Start: 2024-08-20 | End: 2024-08-21 | Stop reason: HOSPADM

## 2024-08-20 RX ORDER — GADOBUTROL 604.72 MG/ML
24 INJECTION INTRAVENOUS ONCE
Status: COMPLETED | OUTPATIENT
Start: 2024-08-20 | End: 2024-08-20

## 2024-08-20 RX ORDER — REGADENOSON 0.08 MG/ML
0.4 INJECTION, SOLUTION INTRAVENOUS ONCE
Status: COMPLETED | OUTPATIENT
Start: 2024-08-20 | End: 2024-08-20

## 2024-08-20 RX ADMIN — REGADENOSON 0.4 MG: 0.08 INJECTION, SOLUTION INTRAVENOUS at 09:33

## 2024-08-20 RX ADMIN — GADOBUTROL 24 ML: 604.72 INJECTION INTRAVENOUS at 09:34

## 2024-09-06 ENCOUNTER — TRANSFERRED RECORDS (OUTPATIENT)
Dept: HEALTH INFORMATION MANAGEMENT | Facility: CLINIC | Age: 84
End: 2024-09-06
Payer: COMMERCIAL

## 2024-09-10 ENCOUNTER — TRANSFERRED RECORDS (OUTPATIENT)
Dept: HEALTH INFORMATION MANAGEMENT | Facility: CLINIC | Age: 84
End: 2024-09-10
Payer: COMMERCIAL

## 2024-10-01 NOTE — H&P (VIEW-ONLY)
[facility]  Cardiac Electrophysiology  [address]  [place] [address]   Office: [phone]  Fax: [phone]     Patient: Ravin Swanson   : 1940       CHIEF COMPLAINT/REASON FOR VISIT  Persistent atrial fibrillation      Assessment/Recommendations     Stage 3B/Persistent vs Stage 3C/Longstanding persistent atrial fibrillation - symptomatic with dyspnea, associated with systolic heart failure, a component of which may be tachycardia-mediated  [id number]  We reviewed atrial fibrillation physiology, managing stroke risk, antiarrhythmic drug therapy, and catheter ablation.  We discussed atrial fibrillation ablation procedures, anticipated success rates, the potential need for re-do ablation vs addition of anti-arrhythmic drugs, procedural risks (including groin bleeding, vascular injury, tamponade, phrenic or esophageal injury, stroke, pulmonary vein stenosis) and recovery expectations.  He would prefer catheter ablation  - PVI, assessment for non-PV triggers, general anesthesia, continue rivaroxaban - anticipate high odds of AF recurrence within 3mo of ablation  - recommended a Daily Secreta device for intermittent heart rhythm checks  - continue metoprolol XL 50mg daily  - continue rivaroxaban 20mg daily  - we discussed the ongoing importance of lifestyle modification (maintaining a healthy weight, aerobic activity, sleep apnea diagnosis and management, alcohol avoidance) as part of a long term strategy for atrial fibrillation management    Systolic heart failure - LVEF 42.7% with small endomyocardial mid-distal inferolateral LV by 2024 cardiac MRI.  Cannot exclude a component of tachycardia-mediated cardiomyopathy  - rhythm control as above  - heart failure medical therapies  - reassess LV function after 2-3 months in sinus rhythm    Follow up: as above           History of Present Illness   Ravin Swanson is a 84 year old male with persistent atrial  fibrillation with severe bi-atrial dilation and fibrosis by 8/20/2024 cardiac MRI, systolic heart failure (LVEF 42.7% with small endomyocardial mid-distal inferolateral LV by 8/20/2024 cardiac MRI), HTN, obesity, WALTER referred by Dr. Landrum for consultation regarding atrial fibrillation.    Mr. Wades atrial fibrillation history is as summarized below:  Symptoms: dyspnea  Symptom onset date: around time of COVID infection 11/2023  Diagnosis date: 1/8/2024 by TTE (AF/RVR), LVEF 40-45%  Admissions/ER visits: none  Prior medical therapies: no prior AADs  Prior DCCVs: none  Prior ablations: none  Percutaneous left atrial appendage occlusion: none    He notes dyspnea with varying degrees of physical activity.  He exercises at the gym on most days.  He denies chest pain, syncope.       Physical Examination  Review of Systems   VITALS: /70 (BP Location: Right arm, Patient Position: Sitting, Cuff Size: Adult Large)   Pulse 91   Ht 1.829 m (6')   Wt 122.9 kg (271 lb)   SpO2 98%   BMI 36.75 kg/m    Wt Readings from Last 3 Encounters:   08/09/24 123.4 kg (272 lb)   07/19/24 121.6 kg (268 lb 1.6 oz)   07/09/24 121.1 kg (267 lb)     CONSTITUTIONAL: well nourished, comfortable, no distress  EYES:  Conjunctivae pink, sclerae clear.    E/N/T:  Oral mucosa pink  RESPIRATORY:  Respiratory effort is normal  CARDIOVASCULAR:  irregular, normal S1 and S2  GASTROINTESTINAL:  Abdomen without masses or tenderness  EXTREMITIES:  No clubbing or cyanosis.    MUSCULOSKELETAL:  Overall grossly normal muscle strength  SKIN:  Overall, skin warm and dry, no lesions.  NEURO/PSYCH:  Oriented x 3 with normal affect.   Constitutional:  No weight loss or loss of appetite    Eyes:  No difficulty with vision, no double vision, no dry eyes  ENT:  No sore throat, difficulty swallowing; changes in hearing or tinnitus  Cardiovascular: As detailed above  Respiratory:  No cough  Musculoskeletal  No joint pain, muscle aches  Neurologic:  No syncope,  lightheadedness, fainting spells   Hematologic: No easy bruising, excessive bleeding tendency   Gastrointestinal:  No jaundice, abdominal pain or abdominal bloating  Genitourinary: No changes in urinary habits, no trouble urinating    Psychiatric: No anxiety or depression      Medical History  Surgical History   Past Medical History:   Diagnosis Date    Hypertension     Past Surgical History:   Procedure Laterality Date    COLONOSCOPY      COLONOSCOPY N/A 5/3/2024    Procedure: COLONOSCOPY with POLYPECTOMY;  Surgeon: Barry Hayes MD;  Location: [facility] OR    GENITOURINARY SURGERY           Family History Social History   No family history on file.     Social History     Tobacco Use    Smoking status: Former     Current packs/day: 0.00     Types: Cigarettes     Quit date:      Years since quittin.7    Smokeless tobacco: Never   Vaping Use    Vaping status: Never Used   Substance Use Topics    Alcohol use: Yes     Comment: minimal    Drug use: Never         Medications  Allergies     Current Outpatient Medications:     furosemide (LASIX) 20 MG tablet, Take 2 tablets (40 mg) by mouth daily, Disp: 180 tablet, Rfl: 1    lisinopril (ZESTRIL) 5 MG tablet, Take 1 tablet (5 mg) by mouth daily, Disp: 90 tablet, Rfl: 3    metoprolol succinate ER (TOPROL XL) 50 MG 24 hr tablet, Take 1 tablet (50 mg) by mouth daily, Disp: 90 tablet, Rfl: 3    rivaroxaban ANTICOAGULANT (XARELTO) 20 MG TABS tablet, Take 1 tablet (20 mg) by mouth daily (with dinner), Disp: 30 tablet, Rfl: 11    spironolactone (ALDACTONE) 25 MG tablet, Take 1 tablet (25 mg) by mouth daily, Disp: 90 tablet, Rfl: 3    XHANCE 93 MCG/ACT nasal spray, Spray into both nostrils 2 times daily, Disp: , Rfl:     zolpidem (AMBIEN) 10 MG tablet, Take tablet by mouth 15 minutes prior to sleep, for Sleep Study, Disp: 1 tablet, Rfl: 0   No Known Allergies       Lab Results    Chemistry CBC Cardiac Enzymes/BNP/TSH/INR   Recent Labs   Lab Test  "08/13/24  1120      POTASSIUM 4.5   CHLORIDE 105   CO2 28   GLC 88   BUN 18.7   CR 1.15   GFRESTIMATED 63   CHANDA 8.8     Recent Labs   Lab Test 08/13/24  1120 08/09/24  1018 06/04/24  1048   CR 1.15 1.08 1.25*          No results for input(s): \"WBC\", \"HGB\", \"HCT\", \"MCV\", \"PLT\" in the last 87522 hours.  No results for input(s): \"HGB\" in the last 62876 hours. No results for input(s): \"TROPONINI\" in the last 17448 hours.  Recent Labs   Lab Test 03/27/24  1146   NTBNP 742     No results for input(s): \"TSH\" in the last 80381 hours.  No results for input(s): \"INR\" in the last 06077 hours.      Data Review    ECGs (tracings independently reviewed)  8/20/2024 - AF, 83bpm, inferior Qw  3/6/2024 - AF, 107bpm  2/10/2015 - SR 63bpm    3/7/2024 Holter monitoring (duration 24hrs) (independently reviewed)  Continuous atrial fibrillation, 75 to 136bpm, average 100bpm.  There were no pauses of greater than 3 seconds.  Rare premature ventricular contractions (<1%).  Symptom triggers - none.    8/20/2024 cardiac MRI  1.  Pharmacological Regadenoson stress cardiac MRI is negative for inducible myocardial ischemia.   2.  Pharmacological stress ECG is negative for inducible myocardial ischemia. Atrial fib with controlled  heart rated noted during stress testing.    3. Normal left ventricular size and moderately reduced global systolic function. The quantified left  ventricular ejection fraction is 42.7%.  Small area of endomyocardial fibrosis involving the mid to distal  inferolateral wall segments.  No consistent with non-coronary pattern such as prior myocarditis.      Pre-contrast T1: 993 ms (normal).  4.  Mildly enlarged right ventricular size with moderate to severe reduction in right ventricular systolic  function.  RVEF: 31.8%.    5.  No significant valvular abnormalities.  6.  Severe right and left atrial enlargement.  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   7.  Mild to moderate ascending aortic enlargement, 43x44 mm " (non-contrast enhanced imaging).      1/8/2024 TTE  The left ventricle is normal in size.  There is moderate concentric left ventricular hypertrophy.  The visual ejection fraction is 40-45%.  There is mild-moderate global hypokinesia of the left ventricle.  The right ventricle is mildly dilated.  Moderately decreased right ventricular systolic function  The left atrium is severely dilated.  The right ventricular systolic pressure is approximated at 30mmHg plus the  right atrial pressure.  Ascending Aorta dilatation is present.  The rhythm was rapid atrial fibrillation.  There is no comparison study available.       Cc: Hedy Landrum MD, Ravin Dockery MD Amila Dilusha William, MD  10/2/2024  9:59 AM

## 2024-10-01 NOTE — PROGRESS NOTES
[facility]  Cardiac Electrophysiology  [address]  [place] [address]   Office: [phone]  Fax: [phone]     Patient: Ravin Swanson   : 1940       CHIEF COMPLAINT/REASON FOR VISIT  Persistent atrial fibrillation      Assessment/Recommendations     Stage 3B/Persistent vs Stage 3C/Longstanding persistent atrial fibrillation - symptomatic with dyspnea, associated with systolic heart failure, a component of which may be tachycardia-mediated  [id number]  We reviewed atrial fibrillation physiology, managing stroke risk, antiarrhythmic drug therapy, and catheter ablation.  We discussed atrial fibrillation ablation procedures, anticipated success rates, the potential need for re-do ablation vs addition of anti-arrhythmic drugs, procedural risks (including groin bleeding, vascular injury, tamponade, phrenic or esophageal injury, stroke, pulmonary vein stenosis) and recovery expectations.  He would prefer catheter ablation  - PVI, assessment for non-PV triggers, general anesthesia, continue rivaroxaban - anticipate high odds of AF recurrence within 3mo of ablation  - recommended a ActiveReplaya device for intermittent heart rhythm checks  - continue metoprolol XL 50mg daily  - continue rivaroxaban 20mg daily  - we discussed the ongoing importance of lifestyle modification (maintaining a healthy weight, aerobic activity, sleep apnea diagnosis and management, alcohol avoidance) as part of a long term strategy for atrial fibrillation management    Systolic heart failure - LVEF 42.7% with small endomyocardial mid-distal inferolateral LV by 2024 cardiac MRI.  Cannot exclude a component of tachycardia-mediated cardiomyopathy  - rhythm control as above  - heart failure medical therapies  - reassess LV function after 2-3 months in sinus rhythm    Follow up: as above           History of Present Illness   Ravin Swanson is a 84 year old male with persistent atrial  fibrillation with severe bi-atrial dilation and fibrosis by 8/20/2024 cardiac MRI, systolic heart failure (LVEF 42.7% with small endomyocardial mid-distal inferolateral LV by 8/20/2024 cardiac MRI), HTN, obesity, WALTER referred by Dr. Landrum for consultation regarding atrial fibrillation.    Mr. Wades atrial fibrillation history is as summarized below:  Symptoms: dyspnea  Symptom onset date: around time of COVID infection 11/2023  Diagnosis date: 1/8/2024 by TTE (AF/RVR), LVEF 40-45%  Admissions/ER visits: none  Prior medical therapies: no prior AADs  Prior DCCVs: none  Prior ablations: none  Percutaneous left atrial appendage occlusion: none    He notes dyspnea with varying degrees of physical activity.  He exercises at the gym on most days.  He denies chest pain, syncope.       Physical Examination  Review of Systems   VITALS: /70 (BP Location: Right arm, Patient Position: Sitting, Cuff Size: Adult Large)   Pulse 91   Ht 1.829 m (6')   Wt 122.9 kg (271 lb)   SpO2 98%   BMI 36.75 kg/m    Wt Readings from Last 3 Encounters:   08/09/24 123.4 kg (272 lb)   07/19/24 121.6 kg (268 lb 1.6 oz)   07/09/24 121.1 kg (267 lb)     CONSTITUTIONAL: well nourished, comfortable, no distress  EYES:  Conjunctivae pink, sclerae clear.    E/N/T:  Oral mucosa pink  RESPIRATORY:  Respiratory effort is normal  CARDIOVASCULAR:  irregular, normal S1 and S2  GASTROINTESTINAL:  Abdomen without masses or tenderness  EXTREMITIES:  No clubbing or cyanosis.    MUSCULOSKELETAL:  Overall grossly normal muscle strength  SKIN:  Overall, skin warm and dry, no lesions.  NEURO/PSYCH:  Oriented x 3 with normal affect.   Constitutional:  No weight loss or loss of appetite    Eyes:  No difficulty with vision, no double vision, no dry eyes  ENT:  No sore throat, difficulty swallowing; changes in hearing or tinnitus  Cardiovascular: As detailed above  Respiratory:  No cough  Musculoskeletal  No joint pain, muscle aches  Neurologic:  No syncope,  lightheadedness, fainting spells   Hematologic: No easy bruising, excessive bleeding tendency   Gastrointestinal:  No jaundice, abdominal pain or abdominal bloating  Genitourinary: No changes in urinary habits, no trouble urinating    Psychiatric: No anxiety or depression      Medical History  Surgical History   Past Medical History:   Diagnosis Date    Hypertension     Past Surgical History:   Procedure Laterality Date    COLONOSCOPY      COLONOSCOPY N/A 5/3/2024    Procedure: COLONOSCOPY with POLYPECTOMY;  Surgeon: Barry Hayes MD;  Location: [facility] Main OR    GENITOURINARY SURGERY           Family History Social History   No family history on file.     Social History     Tobacco Use    Smoking status: Former     Current packs/day: 0.00     Types: Cigarettes     Quit date:      Years since quittin.7    Smokeless tobacco: Never   Vaping Use    Vaping status: Never Used   Substance Use Topics    Alcohol use: Yes     Comment: minimal    Drug use: Never         Medications  Allergies     Current Outpatient Medications:     furosemide (LASIX) 20 MG tablet, Take 2 tablets (40 mg) by mouth daily, Disp: 180 tablet, Rfl: 1    lisinopril (ZESTRIL) 5 MG tablet, Take 1 tablet (5 mg) by mouth daily, Disp: 90 tablet, Rfl: 3    metoprolol succinate ER (TOPROL XL) 50 MG 24 hr tablet, Take 1 tablet (50 mg) by mouth daily, Disp: 90 tablet, Rfl: 3    rivaroxaban ANTICOAGULANT (XARELTO) 20 MG TABS tablet, Take 1 tablet (20 mg) by mouth daily (with dinner), Disp: 30 tablet, Rfl: 11    spironolactone (ALDACTONE) 25 MG tablet, Take 1 tablet (25 mg) by mouth daily, Disp: 90 tablet, Rfl: 3    XHANCE 93 MCG/ACT nasal spray, Spray into both nostrils 2 times daily, Disp: , Rfl:     zolpidem (AMBIEN) 10 MG tablet, Take tablet by mouth 15 minutes prior to sleep, for Sleep Study, Disp: 1 tablet, Rfl: 0   No Known Allergies       Lab Results    Chemistry CBC Cardiac Enzymes/BNP/TSH/INR   Recent Labs   Lab Test  "08/13/24  1120      POTASSIUM 4.5   CHLORIDE 105   CO2 28   GLC 88   BUN 18.7   CR 1.15   GFRESTIMATED 63   CHANDA 8.8     Recent Labs   Lab Test 08/13/24  1120 08/09/24  1018 06/04/24  1048   CR 1.15 1.08 1.25*          No results for input(s): \"WBC\", \"HGB\", \"HCT\", \"MCV\", \"PLT\" in the last 14709 hours.  No results for input(s): \"HGB\" in the last 52829 hours. No results for input(s): \"TROPONINI\" in the last 51270 hours.  Recent Labs   Lab Test 03/27/24  1146   NTBNP 742     No results for input(s): \"TSH\" in the last 92632 hours.  No results for input(s): \"INR\" in the last 96659 hours.      Data Review    ECGs (tracings independently reviewed)  8/20/2024 - AF, 83bpm, inferior Qw  3/6/2024 - AF, 107bpm  2/10/2015 - SR 63bpm    3/7/2024 Holter monitoring (duration 24hrs) (independently reviewed)  Continuous atrial fibrillation, 75 to 136bpm, average 100bpm.  There were no pauses of greater than 3 seconds.  Rare premature ventricular contractions (<1%).  Symptom triggers - none.    8/20/2024 cardiac MRI  1.  Pharmacological Regadenoson stress cardiac MRI is negative for inducible myocardial ischemia.   2.  Pharmacological stress ECG is negative for inducible myocardial ischemia. Atrial fib with controlled  heart rated noted during stress testing.    3. Normal left ventricular size and moderately reduced global systolic function. The quantified left  ventricular ejection fraction is 42.7%.  Small area of endomyocardial fibrosis involving the mid to distal  inferolateral wall segments.  No consistent with non-coronary pattern such as prior myocarditis.      Pre-contrast T1: 993 ms (normal).  4.  Mildly enlarged right ventricular size with moderate to severe reduction in right ventricular systolic  function.  RVEF: 31.8%.    5.  No significant valvular abnormalities.  6.  Severe right and left atrial enlargement.  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   7.  Mild to moderate ascending aortic enlargement, 43x44 mm " (non-contrast enhanced imaging).      1/8/2024 TTE  The left ventricle is normal in size.  There is moderate concentric left ventricular hypertrophy.  The visual ejection fraction is 40-45%.  There is mild-moderate global hypokinesia of the left ventricle.  The right ventricle is mildly dilated.  Moderately decreased right ventricular systolic function  The left atrium is severely dilated.  The right ventricular systolic pressure is approximated at 30mmHg plus the  right atrial pressure.  Ascending Aorta dilatation is present.  The rhythm was rapid atrial fibrillation.  There is no comparison study available.       Cc: Hedy Landrum MD, Ravin Dockery MD Amila Dilusha William, MD  10/2/2024  9:59 AM

## 2024-10-02 ENCOUNTER — PREP FOR PROCEDURE (OUTPATIENT)
Dept: CARDIOLOGY | Facility: CLINIC | Age: 84
End: 2024-10-02

## 2024-10-02 ENCOUNTER — OFFICE VISIT (OUTPATIENT)
Dept: CARDIOLOGY | Facility: CLINIC | Age: 84
End: 2024-10-02
Payer: COMMERCIAL

## 2024-10-02 ENCOUNTER — DOCUMENTATION ONLY (OUTPATIENT)
Dept: CARDIOLOGY | Facility: CLINIC | Age: 84
End: 2024-10-02

## 2024-10-02 VITALS
SYSTOLIC BLOOD PRESSURE: 102 MMHG | HEIGHT: 72 IN | DIASTOLIC BLOOD PRESSURE: 70 MMHG | BODY MASS INDEX: 36.7 KG/M2 | HEART RATE: 91 BPM | WEIGHT: 271 LBS | OXYGEN SATURATION: 98 %

## 2024-10-02 DIAGNOSIS — I48.19 PERSISTENT ATRIAL FIBRILLATION (H): Primary | ICD-10-CM

## 2024-10-02 PROCEDURE — 99205 OFFICE O/P NEW HI 60 MIN: CPT | Performed by: INTERNAL MEDICINE

## 2024-10-02 RX ORDER — SODIUM CHLORIDE 9 MG/ML
100 INJECTION, SOLUTION INTRAVENOUS CONTINUOUS
Status: CANCELLED | OUTPATIENT
Start: 2024-10-28

## 2024-10-02 RX ORDER — LIDOCAINE 40 MG/G
CREAM TOPICAL
Status: CANCELLED | OUTPATIENT
Start: 2024-10-02

## 2024-10-02 RX ORDER — FENTANYL CITRATE 50 UG/ML
25 INJECTION, SOLUTION INTRAMUSCULAR; INTRAVENOUS
Status: CANCELLED | OUTPATIENT
Start: 2024-10-28

## 2024-10-02 NOTE — LETTER
10/2/2024    Ravin Dockery MD  [address] [place] [address]    RE: Ravin WICK Sky       Dear Colleague,     I had the pleasure of seeing Ravin Swanson in the [facility].     [facility]  Cardiac Electrophysiology  [address]  [place] [address]   Office: [phone]  Fax: [phone]     Patient: Ravin Swanson   : 1940       CHIEF COMPLAINT/REASON FOR VISIT  Persistent atrial fibrillation      Assessment/Recommendations     Stage 3B/Persistent vs Stage 3C/Longstanding persistent atrial fibrillation - symptomatic with dyspnea, associated with systolic heart failure, a component of which may be tachycardia-mediated  [id number]  We reviewed atrial fibrillation physiology, managing stroke risk, antiarrhythmic drug therapy, and catheter ablation.  We discussed atrial fibrillation ablation procedures, anticipated success rates, the potential need for re-do ablation vs addition of anti-arrhythmic drugs, procedural risks (including groin bleeding, vascular injury, tamponade, phrenic or esophageal injury, stroke, pulmonary vein stenosis) and recovery expectations.  He would prefer catheter ablation  - PVI, assessment for non-PV triggers, general anesthesia, continue rivaroxaban - anticipate high odds of AF recurrence within 3mo of ablation  - recommended a Signifyddia device for intermittent heart rhythm checks  - continue metoprolol XL 50mg daily  - continue rivaroxaban 20mg daily  - we discussed the ongoing importance of lifestyle modification (maintaining a healthy weight, aerobic activity, sleep apnea diagnosis and management, alcohol avoidance) as part of a long term strategy for atrial fibrillation management    Systolic heart failure - LVEF 42.7% with small endomyocardial mid-distal inferolateral LV by 2024 cardiac MRI.  Cannot exclude a component of tachycardia-mediated cardiomyopathy  - rhythm control as above  - heart  failure medical therapies  - reassess LV function after 2-3 months in sinus rhythm    Follow up: as above           History of Present Illness   Ravin Swanson is a 84 year old male with persistent atrial fibrillation with severe bi-atrial dilation and fibrosis by 8/20/2024 cardiac MRI, systolic heart failure (LVEF 42.7% with small endomyocardial mid-distal inferolateral LV by 8/20/2024 cardiac MRI), HTN, obesity, WALTER referred by Dr. Landrum for consultation regarding atrial fibrillation.    Mr. Swanson's atrial fibrillation history is as summarized below:  Symptoms: dyspnea  Symptom onset date: around time of COVID infection 11/2023  Diagnosis date: 1/8/2024 by TTE (AF/RVR), LVEF 40-45%  Admissions/ER visits: none  Prior medical therapies: no prior AADs  Prior DCCVs: none  Prior ablations: none  Percutaneous left atrial appendage occlusion: none    He notes dyspnea with varying degrees of physical activity.  He exercises at the gym on most days.  He denies chest pain, syncope.       Physical Examination  Review of Systems   VITALS: /70 (BP Location: Right arm, Patient Position: Sitting, Cuff Size: Adult Large)   Pulse 91   Ht 1.829 m (6')   Wt 122.9 kg (271 lb)   SpO2 98%   BMI 36.75 kg/m    Wt Readings from Last 3 Encounters:   08/09/24 123.4 kg (272 lb)   07/19/24 121.6 kg (268 lb 1.6 oz)   07/09/24 121.1 kg (267 lb)     CONSTITUTIONAL: well nourished, comfortable, no distress  EYES:  Conjunctivae pink, sclerae clear.    E/N/T:  Oral mucosa pink  RESPIRATORY:  Respiratory effort is normal  CARDIOVASCULAR:  irregular, normal S1 and S2  GASTROINTESTINAL:  Abdomen without masses or tenderness  EXTREMITIES:  No clubbing or cyanosis.    MUSCULOSKELETAL:  Overall grossly normal muscle strength  SKIN:  Overall, skin warm and dry, no lesions.  NEURO/PSYCH:  Oriented x 3 with normal affect.   Constitutional:  No weight loss or loss of appetite    Eyes:  No difficulty with vision, no double vision, no dry eyes  ENT:   No sore throat, difficulty swallowing; changes in hearing or tinnitus  Cardiovascular: As detailed above  Respiratory:  No cough  Musculoskeletal  No joint pain, muscle aches  Neurologic:  No syncope, lightheadedness, fainting spells   Hematologic: No easy bruising, excessive bleeding tendency   Gastrointestinal:  No jaundice, abdominal pain or abdominal bloating  Genitourinary: No changes in urinary habits, no trouble urinating    Psychiatric: No anxiety or depression      Medical History  Surgical History   Past Medical History:   Diagnosis Date     Hypertension     Past Surgical History:   Procedure Laterality Date     COLONOSCOPY       COLONOSCOPY N/A 5/3/2024    Procedure: COLONOSCOPY with POLYPECTOMY;  Surgeon: Barry Hayes MD;  Location: [facility] OR     GENITOURINARY SURGERY           Family History Social History   No family history on file.     Social History     Tobacco Use     Smoking status: Former     Current packs/day: 0.00     Types: Cigarettes     Quit date:      Years since quittin.7     Smokeless tobacco: Never   Vaping Use     Vaping status: Never Used   Substance Use Topics     Alcohol use: Yes     Comment: minimal     Drug use: Never         Medications  Allergies     Current Outpatient Medications:      furosemide (LASIX) 20 MG tablet, Take 2 tablets (40 mg) by mouth daily, Disp: 180 tablet, Rfl: 1     lisinopril (ZESTRIL) 5 MG tablet, Take 1 tablet (5 mg) by mouth daily, Disp: 90 tablet, Rfl: 3     metoprolol succinate ER (TOPROL XL) 50 MG 24 hr tablet, Take 1 tablet (50 mg) by mouth daily, Disp: 90 tablet, Rfl: 3     rivaroxaban ANTICOAGULANT (XARELTO) 20 MG TABS tablet, Take 1 tablet (20 mg) by mouth daily (with dinner), Disp: 30 tablet, Rfl: 11     spironolactone (ALDACTONE) 25 MG tablet, Take 1 tablet (25 mg) by mouth daily, Disp: 90 tablet, Rfl: 3     XHANCE 93 MCG/ACT nasal spray, Spray into both nostrils 2 times daily, Disp: , Rfl:      zolpidem (AMBIEN) 10 MG  "tablet, Take tablet by mouth 15 minutes prior to sleep, for Sleep Study, Disp: 1 tablet, Rfl: 0   No Known Allergies       Lab Results    Chemistry CBC Cardiac Enzymes/BNP/TSH/INR   Recent Labs   Lab Test 08/13/24  1120      POTASSIUM 4.5   CHLORIDE 105   CO2 28   GLC 88   BUN 18.7   CR 1.15   GFRESTIMATED 63   CHANDA 8.8     Recent Labs   Lab Test 08/13/24  1120 08/09/24  1018 06/04/24  1048   CR 1.15 1.08 1.25*          No results for input(s): \"WBC\", \"HGB\", \"HCT\", \"MCV\", \"PLT\" in the last 61276 hours.  No results for input(s): \"HGB\" in the last 11775 hours. No results for input(s): \"TROPONINI\" in the last 40905 hours.  Recent Labs   Lab Test 03/27/24  1146   NTBNP 742     No results for input(s): \"TSH\" in the last 83313 hours.  No results for input(s): \"INR\" in the last 91476 hours.      Data Review    ECGs (tracings independently reviewed)  8/20/2024 - AF, 83bpm, inferior Qw  3/6/2024 - AF, 107bpm  2/10/2015 - SR 63bpm    3/7/2024 Holter monitoring (duration 24hrs) (independently reviewed)  Continuous atrial fibrillation, 75 to 136bpm, average 100bpm.  There were no pauses of greater than 3 seconds.  Rare premature ventricular contractions (<1%).  Symptom triggers - none.    8/20/2024 cardiac MRI  1.  Pharmacological Regadenoson stress cardiac MRI is negative for inducible myocardial ischemia.   2.  Pharmacological stress ECG is negative for inducible myocardial ischemia. Atrial fib with controlled  heart rated noted during stress testing.    3. Normal left ventricular size and moderately reduced global systolic function. The quantified left  ventricular ejection fraction is 42.7%.  Small area of endomyocardial fibrosis involving the mid to distal  inferolateral wall segments.  No consistent with non-coronary pattern such as prior myocarditis.      Pre-contrast T1: 993 ms (normal).  4.  Mildly enlarged right ventricular size with moderate to severe reduction in right ventricular systolic  function.  RVEF: " 31.8%.    5.  No significant valvular abnormalities.  6.  Severe right and left atrial enlargement.  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   7.  Mild to moderate ascending aortic enlargement, 43x44 mm (non-contrast enhanced imaging).      1/8/2024 TTE  The left ventricle is normal in size.  There is moderate concentric left ventricular hypertrophy.  The visual ejection fraction is 40-45%.  There is mild-moderate global hypokinesia of the left ventricle.  The right ventricle is mildly dilated.  Moderately decreased right ventricular systolic function  The left atrium is severely dilated.  The right ventricular systolic pressure is approximated at 30mmHg plus the  right atrial pressure.  Ascending Aorta dilatation is present.  The rhythm was rapid atrial fibrillation.  There is no comparison study available.       Cc: Hedy Landrum MD, Ravin Dockery MD Amila Dilusha William, MD  10/2/2024  9:59 AM        Thank you for allowing me to participate in the care of your patient.      Sincerely,     Isak Alicia MD     [facility]  cc:   No referring provider defined for this encounter.

## 2024-10-02 NOTE — PATIENT INSTRUCTIONS
[facility]  Cardiac Electrophysiology  [address]  [place] [address]   Office: [phone]  Fax: [phone]     Thank you for seeing us in clinic today - it is a pleasure to be a part of your care team.  Below is a summary of our plan from today's visit.       You have atrial fibrillation and somewhat reduced heart function with left ventricular ejection fraction (LVEF) 43% (normal 55-65%).  We reviewed atrial fibrillation, treatment options (ablation vs antiarrhythmic drug therapy), and long term stroke risk prevention (blood thinner).    We will plan for the following:  - our office will work with you to coordinate an atrial fibrillation ablation  - look into acquiring a [employer] device for intermittent heart rhythm checks at home  - continue metoprolol XL 50mg daily  - continue rivaroxaban 20mg daily     Please do not hesitate to be in touch with our office at [phone] with any questions that may arise.       Thank you for trusting us with your care,    Isak Alicia MD  Clinical Cardiac Electrophysiology  [facility]  [address]  [place] [address]   Office: [phone]  Fax: [phone]        ATRIAL FIBRILLATION: Patient Information    What is atrial fibrillation?  Atrial fibrillation (AF, A-fib) is a common heart rhythm problem (arrhythmia) occurring within the upper chambers of the heart (the atria).  In normal rhythm, the upper and lower chambers of the heart are electrically driven to contract in a coordinated sequence.  In atrial fibrillation, the atria lose their ability to contract because of rapid and chaotic electrical activity.  The lower chambers of the heart (the ventricles) continue to pump blood throughout the body, though with irregular and often faster rate due to the chaotic activity within the atria.        How do I know if I have atrial fibrillation?   Some people may feel their heart beating  faster, harder, or irregularly while in atrial fibrillation.  Others may be lightheaded, fatigued, feel weak or tired or become more short of breath especially with activities.  Some patients have no symptoms at all.  Atrial fibrillation may be found due to an irregular pulse or on an electrocardiogram (ECG). Atrial fibrillation can start and stop on its own, and episodes can last from seconds to several months.      How common is atrial fibrillation?   An estimated 3-6 million people in the [place] have atrial fibrillation.  Atrial fibrillation is a common heart rhythm problem for older persons, affecting as estimated 12-15% of people over the age of 65 years of age.    What causes atrial fibrillation?   Age is the most important risk factor for atrial fibrillation.  Atrial fibrillation is more common in people with other heart disease, high blood pressure, diabetes, obesity, sleep apnea and in people who regularly consume alcohol.  Surgery, lung disease, or thyroid problems can lead to atrial fibrillation.  Atrial fibrillation has multiple possible causes, and in most cases a single cause cannot be found.  Atrial fibrillation is a progressive condition, usually starting with at an early stage with short and infrequent episodes.  In later stages of disease, more frequent and longer lasting episodes of atrial fibrillation occur, ultimately culminating in episodes which do not spontaneously terminate.  Generally, more enlargement and scarring within the upper chambers of the heart is observed as atrial fibrillation progresses from early to late-stage disease.    How is atrial fibrillation diagnosed and evaluated?    Because of its start-stop nature, atrial fibrillation can be challenging to diagnose.  Atrial fibrillation is most commonly diagnosed via cardiac rhythm recordings - either an ECG or wearable cardiac rhythm monitor.  For patients with pacemakers, defibrillators or implantable loop recorders, atrial  fibrillation may be recorded via these devices.  Recently, commercially available devices (eg. Apple Watch, Heavy device, certain FitBit devices, others) can allow patients to take 30 second cardiac rhythm recordings which may document atrial fibrillation.  Once atrial fibrillation is diagnosed, additional tests include blood tests and an echocardiogram.  The echocardiogram uses ultrasound to look at your heart to assess your cardiac function and evaluate for other heart disease.  Additional evaluation may include CT or MRI studies.    Is atrial fibrillation dangerous?   Atrial fibrillation is not usually a life-threatening arrhythmia.  The most serious consequences of atrial fibrillation including stroke and worsening of overall cardiac function.  While in atrial fibrillation, the upper cardiac chambers do not contract normally, resulting in slower blood flow and increased risk of clot formation.  If this blood clot becomes detached from the heart a stroke can occur.  Unfortunately, stroke can be the first sign of atrial fibrillation for some people.  With a stroke, you may notice abnormal sensation, weakness on one side of the body or face, changes in your vision or speech.  If you have any of these signs, you should contact EMS and be evaluated in an emergency room as soon as possible.      How is atrial fibrillation treated?     Several treatment options exist for suppressing atrial fibrillation - however, it is not an easily curable arrhythmia.  The first goal in managing atrial fibrillation is to minimize stroke risk.  The second goal is to improve symptoms associated with atrial fibrillation.  Finally, in patients with reduced cardiac function, maintaining normal rhythm can help improve cardiac function.      Blood thinners are used to reduce the risk of stroke in patients with high estimated stroke risk related to atrial fibrillation.  For patients at higher risk of bleeding related to blood thinner use,  implantable devices may be an option to reduce stroke risk without the need for long term blood thinner use.      Atrial fibrillation can be managed via two strategies: rate control and rhythm control.  In a rate control strategy, continued atrial fibrillation is accepted and medications (eg. beta-blockers or calcium channel blockers) are used to control the lower chamber rate.  In a rhythm control strategy, anti-arrhythmic medications or catheter ablation are used to maintain normal cardiac rhythm and slow disease progression by suppressing atrial fibrillation.  A procedure called a cardioversion, in which an electric shock is delivered through patches placed on the chest wall while under deep sedation, can be performed to temporarily restore normal cardiac rhythm, though does not address the chance of atrial fibrillation recurrence.  Treatments are more effective for earlier rather than later stage atrial fibrillation.  Lifestyle modifications (maintaining a healthy weight, aerobic exercise, diagnosing and treating sleep apnea, and minimizing alcohol intake) are important elements of atrial fibrillation rhythm control.     What is catheter ablation for atrial fibrillation?  Cardiac catheter ablation is a commonly performed, minimally invasive procedure performed by a cardiac electrophysiologist to treat many different cardiac rhythm abnormalities.  During catheter ablation, long, thin, flexible tubes are advanced into the heart via small sheaths inserted into the femoral veins and thermal energy (either heating or cooling) is applied within the heart to disrupt abnormal electrical activity.  Atrial fibrillation ablation is performed under general anesthesia, with procedures generally taking approximately 2-3 hours.  Patients are typically observed for 3-5 hours after the ablation, and in most cases can be discharged home the same day.  Atrial fibrillation ablation is associated with better outcomes (mortality,  cardiovascular hospitalizations, atrial arrhythmia recurrences) compared to antiarrhythmic drug therapy.  However, atrial fibrillation recurrences are not uncommon, and repeat catheter ablation may be offered.  Your electrophysiology team can review atrial fibrillation ablation, anticipated success rates, risks, and recovery expectations with you.    What are anti-arrhythmic medications?  Anti-arrhythmic medications are specialized drugs which alter cardiac electrical functioning to suppress arrhythmias.  There are several anti-arrhythmic medications available, each with its own success rate and side effects.  Some anti-arrhythmic medications are less effective though safer to use, others are more effective though have serious potential toxicities.  Atrial fibrillation recurrences are common and may require dose adjustment or change in antiarrhythmic therapy.  Your electrophysiology team will carefully consider which medication would be the best and safest for your particular case.      Can I live a normal life?    The goal of atrial fibrillation management is for patients to live normal lives without being limited by symptoms related to atrial fibrillation.    Are any additional educational resources available?  There are a number of excellent atrial fibrillation education resources available to you online.  A few options you may wish to review include:  hrsonline.org/guide-atrial-fibrillation  afibmatters.org  getsmartaboutafib.com  stopaf.com    What comes next?    Consider your management options and let us know how we can help in your decision process.  Please take medications as they have been prescribed.  You should also get any tests that may have been ordered for you.      When to Call Your Doctor or seek emergency care:  Call your doctor or seek emergency care if you have any significant changes with the following:  Weakness  Dizziness  Fainting  Fatigue  Shortness of breath  Chest pain with increased  activity  If you are concerned that your heart rate is too fast or too slow  Bleeding that does not stop in 10 minutes  Coughing or throwing up blood  Bloody diarrhea or bleeding hemorrhoids  Dark-colored urine or black stool  Allergic reactions:  Rash  Itching  Swelling  Trouble breathing or swallowing      Please call the [facility] at [phone] if you have concerns about your symptoms, your medicines, or your follow-up appointments.

## 2024-10-02 NOTE — PROGRESS NOTES
H&P:  Card OV  Date:  EP MICKIE [] PVI  Order Case Req Y  Order Set Y Lab/EKG   Orders [] Imaging Order None     AC Xarelto-CONTINUE   AAD Metoprolol-Continue   PPI/H2 Blocker Start Protonix 40mg Daily 3 days prior, 6wk post   Diuretics spironolactone   DM/GLP-1 DM Meds- None  GLP-1- None   Hi,     Can we please set MR. Swanson up for PVI, general anesthesia, continue rivaroxaban - anticipate high odds of AF recurrence within 3mo of ablation.     Thanks,   Vignesh   Ravin Swanson, 1940, [id number]  Home:[phone] (home) Cell:[phone] (mobile)  Emergency Contact: *No Contact Specified*   PCP: Ravin Dockery, [phone]    Important patient information for CSC/Cath Lab staff : None    [facility] EP Cath Lab Procedure Order   Ablation Type:  Atrial Fibrillation (Persistent)  Ordering Provider: Dr Ravin Simon Ordered and Prepped: 10/2/2024 Sommer Fang RN    Scheduling Information:  Anticipated Case Duration:  Standard ( Case per day SA 2:1, DW 5:1, KA 3:1)   Scheduling Timeframe:  Next Available  Scheduling Restrictions: None  Scheduling Contact: Please contact pt to schedule, if you are unable to schedule date within the next 24 hours please contact pt to update on scheduling process  EP RN Follow Up Apt: Schedule EP RN PC visit 3-4 days s/p PVI  MD Preference: Scheduling with ordering provider  Current Device/Device Co Needed for Procedure: None NoneNone  Pre-Procedural Testing needed: None  Mapping System Required:  Carto (Dr Alicia and Dr Knutson)  ICE Needed:  Yes  Anesthesia:General Anesthesia    [facility] EP Cath Lab Prep   H&P:  Compled by cardiology on 10-2-24 if scheduled within 30 days, pt will need RN education and Labs apt within 3 days of procedure. If pts PVI scheduled outside of 30 days, pt to schedule H&P with EP MICKIE, RN Teach, and Labs within 30 days of PVI  Pre-op Labs: CBC, BMP, Beta HcG if appropriate, and INR if on Warfarin will be ordered AM of procedure, if not completed at pre-op H&P  within 7 days of procedure.  T&S Pre-Procedure Review: Does not need for PVI procedures  Medical Records Pertinent for Procedure:  None  Iodinated Contrast Dye Allergies (Does not include Shellfish, Egg, and/or Iodine Allergy): NA  GLP-1 Protocol: If on Dulaglutide (Trulicity) (weekly)- Injection hold 7 days prior to procedure  , Exenatide extended release (Bydureon bcise) (weekly)- Injection hold 7 days prior to procedure, Exenatide (Byetta) (twice daily)- Oral Tablet hold day prior and morning of procedure and for Injection hold 7 days prior to procedure, Semaglutide (Ozempic) (weekly)- Injection and Oral hold 7 days prior to procedure, Liraglutide (Victoza, Saxenda) (daily)- Injection hold day prior and morning of procedure  Follow Up S/P: EP RN 3-4 PC Visit and EP MICKIE 6wk (To be scheduled at time of case scheduling)    No Known Allergies    Current Outpatient Medications:     furosemide (LASIX) 20 MG tablet, Take 2 tablets (40 mg) by mouth daily, Disp: 180 tablet, Rfl: 1    lisinopril (ZESTRIL) 5 MG tablet, Take 1 tablet (5 mg) by mouth daily, Disp: 90 tablet, Rfl: 3    metoprolol succinate ER (TOPROL XL) 50 MG 24 hr tablet, Take 1 tablet (50 mg) by mouth daily, Disp: 90 tablet, Rfl: 3    rivaroxaban ANTICOAGULANT (XARELTO) 20 MG TABS tablet, Take 1 tablet (20 mg) by mouth daily (with dinner), Disp: 30 tablet, Rfl: 11    spironolactone (ALDACTONE) 25 MG tablet, Take 1 tablet (25 mg) by mouth daily, Disp: 90 tablet, Rfl: 3    XHANCE 93 MCG/ACT nasal spray, Spray into both nostrils 2 times daily, Disp: , Rfl:     zolpidem (AMBIEN) 10 MG tablet, Take tablet by mouth 15 minutes prior to sleep, for Sleep Study, Disp: 1 tablet, Rfl: 0    Documentation Date:10/2/2024 11:09 AM  Sommer Fang RN

## 2024-10-04 DIAGNOSIS — I48.19 PERSISTENT ATRIAL FIBRILLATION (H): Primary | ICD-10-CM

## 2024-10-22 NOTE — PROGRESS NOTES
"        Assessment/Recommendations   Assessment:    1.  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    Current home weight is 266 lbs. Weight is down 12 pounds with improvement in heart failure status.    Shortness of breath on exertion is unchanged from baseline.     Heart failure regimen includes:    -Beta-blocker therapy with metoprolol succinate 50 mg daily    -ACE inhibitor with lisinopril 5 mg daily    -Aldosterone blocker therapy with spironolactone 25 mg daily    -Not on SGLT2 Inhibitor     -Diuretic therapy with furosemide 40 mg daily    Stable lab in June 2024 except creatinine was mildly elevated 1.24.  Previously 1.23.    2.  Persistent Atrial fibrillation: Mild RVR heart rate 103 bpm per EKG.  Patient is scheduled for  PVI ablation on 10/20/2024.    3.  Morbid obesity with BMI of 37.76    4.  Possible obstructive sleep apnea: Patient had a sleep evaluation consultation.  Patient scheduled for sleep study in December.  He is thinking about canceling it.  We discussed in length about the importance of having further sleep evaluation.  We also discussed about the correlation between untreated sleep apnea, atrial fibrillation, obesity, and heart failure.    Plan/Recommendation:  -Labs per EP for upcoming PVI ablation  -Continue current heart failure medication  -Continue heart failure diet, daily weight monitoring, and adequate fluid intake    He will follow-up with Dr. Landrum in January 2024.  Follow-up with me in 4-5 months     The longitudinal plan of care for cardiomyopathy likely tachycardia mediated, heart failure with reduced ejection fraction was addressed during this visit.?Due to the added complexity in care, I will continue to support Ravin Swanson in the subsequent management of this condition(s) and with the ongoing continuity of care of this condition(s)\".     History of Present " Illness/Subjective    Mr. Ravin Swanson is a 84 year old male with a past medical history of morbid obesity, COVID infection in November 2023, dyspnea on exertion, and tachycardia mediated cardiomyopathy, persistent atrial fibrillation and chronic systolic heart failure who is seen at [facility] for continued heart failure follow-up.    During last heart failure clinic visit, patient weight is up 16 pounds.  He was found hypervolemic on exam.  His diuretic dose was increased.      Today, Bill reports 10 pounds weight loss.  However, he continues to have shortness of breath on exertion especially walking up incline which is unchanged from baseline.  He does have some fatigue.  His lower extremity edema has resolved.  He denies lightheadedness, shortness of breath, orthopnea, PND, palpitations, chest pain, abdominal fullness/bloating, and lower extremity edema.      He has been experiencing some runny nose for a while.  He denies fever or chills.    He continues to go to gym and workout 5-6 times a week for about 45 minutes at a time.    He reports following low-sodium diet.  He reports adequate fluid intake    ECHO from 1/8/24-Reviewed:   Interpretation Summary   The left ventricle is normal in size.  There is moderate concentric left ventricular hypertrophy.  The visual ejection fraction is 40-45%.  There is mild-moderate global hypokinesia of the left ventricle.  The right ventricle is mildly dilated.  Moderately decreased right ventricular systolic function  The left atrium is severely dilated.  The right ventricular systolic pressure is approximated at 30mmHg plus the  right atrial pressure.  Ascending Aorta dilatation is present.  The rhythm was rapid atrial fibrillation.  There is no comparison study available.     Physical Examination Review of Systems   /76 (BP Location: Right arm, Patient Position: Sitting, Cuff Size: Adult Large)   Pulse 103   Resp 20   Wt  123.9 kg (273 lb 1.6 oz)   SpO2 96%   BMI 37.04 kg/m    Body mass index is 37.04 kg/m .  Wt Readings from Last 3 Encounters:   10/23/24 123.9 kg (273 lb 1.6 oz)   10/02/24 122.9 kg (271 lb)   08/09/24 123.4 kg (272 lb)     General Appearance:   no distress, normal body habitus   ENT/Mouth: membranes moist, no oral lesions or bleeding gums.      EYES:  no scleral icterus, normal conjunctivae   Neck: no carotid bruits or thyromegaly   Chest/Lungs:   lungs are clear to auscultation, no rales or wheezing, equal chest wall expansion except noted crackles in bilateral bases   Cardiovascular:   Irregularly irregular; Normal first and second heart sounds with no murmurs, rubs, or gallops; the carotid, radial and posterior tibial pulses are intact, JVP is difficult to assess due to the patient's obesity and body habitus   , mild edema bilaterally    Abdomen:  Large but soft; organomegaly, masses, bruits, or tenderness; bowel sounds are present   Extremities   no cyanosis or clubbing; CMS intact   Skin: no xanthelasma, warm.    Neurologic: normal  bilateral, no tremors   Psychiatric: alert and oriented x3, calm                                                    Negative unless noted in HPI     Medical History  Surgical History Family History Social History   Past Medical History:   Diagnosis Date    Hypertension     Past Surgical History:   Procedure Laterality Date    COLONOSCOPY      COLONOSCOPY N/A 5/3/2024    Procedure: COLONOSCOPY with POLYPECTOMY;  Surgeon: Barry Hayes MD;  Location: [facility] OR    GENITOURINARY SURGERY      No family history on file. Social History     Socioeconomic History    Marital status:      Spouse name: Not on file    Number of children: Not on file    Years of education: Not on file    Highest education level: Not on file   Occupational History    Not on file   Tobacco Use    Smoking status: Former     Current packs/day: 0.00     Types: Cigarettes     Quit date:  "1986     Years since quittin.8    Smokeless tobacco: Never   Vaping Use    Vaping status: Never Used   Substance and Sexual Activity    Alcohol use: Yes     Comment: minimal    Drug use: Never    Sexual activity: Not on file   Other Topics Concern    Not on file   Social History Narrative    Not on file     Social Drivers of Health     Financial Resource Strain: Not on file   Food Insecurity: Not on file   Transportation Needs: Not on file   Physical Activity: Not on file   Stress: Not on file   Social Connections: Not on file   Interpersonal Safety: Not on file   Housing Stability: Not on file          Medications  Allergies   Current Outpatient Medications   Medication Sig Dispense Refill    furosemide (LASIX) 20 MG tablet Take 2 tablets (40 mg) by mouth daily 180 tablet 1    lisinopril (ZESTRIL) 5 MG tablet Take 1 tablet (5 mg) by mouth daily 90 tablet 3    metoprolol succinate ER (TOPROL XL) 50 MG 24 hr tablet Take 1 tablet (50 mg) by mouth daily 90 tablet 3    rivaroxaban ANTICOAGULANT (XARELTO) 20 MG TABS tablet Take 1 tablet (20 mg) by mouth daily (with dinner) 30 tablet 11    spironolactone (ALDACTONE) 25 MG tablet Take 1 tablet (25 mg) by mouth daily 90 tablet 3    zolpidem (AMBIEN) 10 MG tablet Take tablet by mouth 15 minutes prior to sleep, for Sleep Study (Patient not taking: Reported on 10/23/2024) 1 tablet 0    No Known Allergies      Lab Results    Chemistry/lipid CBC Cardiac Enzymes/BNP/TSH/INR   Lab Results   Component Value Date    CHOL 124 2024    HDL 44 2024    TRIG 66 2024    BUN 18.7 2024     2024    CO2 28 2024    No results found for: \"WBC\", \"HGB\", \"HCT\", \"MCV\", \"PLT\" No results found for: \"CKTOTAL\", \"CKMB\", \"TROPONINI\", \"BNP\", \"TSH\", \"INR\"     25 minutes spent on the date of encounter doing chart review, review of test results, interpretation with above tests, patient visit, and documentation.        This note has been dictated using voice " recognition software. Any grammatical, typographical, or context distortions are unintentional and inherent to the software

## 2024-10-22 NOTE — H&P (VIEW-ONLY)
"        Assessment/Recommendations   Assessment:    1.  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    Current home weight is 266 lbs. Weight is down 12 pounds with improvement in heart failure status.    Shortness of breath on exertion is unchanged from baseline.     Heart failure regimen includes:    -Beta-blocker therapy with metoprolol succinate 50 mg daily    -ACE inhibitor with lisinopril 5 mg daily    -Aldosterone blocker therapy with spironolactone 25 mg daily    -Not on SGLT2 Inhibitor     -Diuretic therapy with furosemide 40 mg daily    Stable lab in June 2024 except creatinine was mildly elevated 1.24.  Previously 1.23.    2.  Persistent Atrial fibrillation: Mild RVR heart rate 103 bpm per EKG.  Patient is scheduled for  PVI ablation on 10/20/2024.    3.  Morbid obesity with BMI of 37.76    4.  Possible obstructive sleep apnea: Patient had a sleep evaluation consultation.  Patient scheduled for sleep study in December.  He is thinking about canceling it.  We discussed in length about the importance of having further sleep evaluation.  We also discussed about the correlation between untreated sleep apnea, atrial fibrillation, obesity, and heart failure.    Plan/Recommendation:  -Labs per EP for upcoming PVI ablation  -Continue current heart failure medication  -Continue heart failure diet, daily weight monitoring, and adequate fluid intake    He will follow-up with Dr. Landrum in January 2024.  Follow-up with me in 4-5 months     The longitudinal plan of care for cardiomyopathy likely tachycardia mediated, heart failure with reduced ejection fraction was addressed during this visit.?Due to the added complexity in care, I will continue to support Ravin Swanson in the subsequent management of this condition(s) and with the ongoing continuity of care of this condition(s)\".     History of Present " Illness/Subjective    Mr. Ravin Swanson is a 84 year old male with a past medical history of morbid obesity, COVID infection in November 2023, dyspnea on exertion, and tachycardia mediated cardiomyopathy, persistent atrial fibrillation and chronic systolic heart failure who is seen at [facility] for continued heart failure follow-up.    During last heart failure clinic visit, patient weight is up 16 pounds.  He was found hypervolemic on exam.  His diuretic dose was increased.      Today, Bill reports 10 pounds weight loss.  However, he continues to have shortness of breath on exertion especially walking up incline which is unchanged from baseline.  He does have some fatigue.  His lower extremity edema has resolved.  He denies lightheadedness, shortness of breath, orthopnea, PND, palpitations, chest pain, abdominal fullness/bloating, and lower extremity edema.      He has been experiencing some runny nose for a while.  He denies fever or chills.    He continues to go to gym and workout 5-6 times a week for about 45 minutes at a time.    He reports following low-sodium diet.  He reports adequate fluid intake    ECHO from 1/8/24-Reviewed:   Interpretation Summary   The left ventricle is normal in size.  There is moderate concentric left ventricular hypertrophy.  The visual ejection fraction is 40-45%.  There is mild-moderate global hypokinesia of the left ventricle.  The right ventricle is mildly dilated.  Moderately decreased right ventricular systolic function  The left atrium is severely dilated.  The right ventricular systolic pressure is approximated at 30mmHg plus the  right atrial pressure.  Ascending Aorta dilatation is present.  The rhythm was rapid atrial fibrillation.  There is no comparison study available.     Physical Examination Review of Systems   /76 (BP Location: Right arm, Patient Position: Sitting, Cuff Size: Adult Large)   Pulse 103   Resp 20   Wt  123.9 kg (273 lb 1.6 oz)   SpO2 96%   BMI 37.04 kg/m    Body mass index is 37.04 kg/m .  Wt Readings from Last 3 Encounters:   10/23/24 123.9 kg (273 lb 1.6 oz)   10/02/24 122.9 kg (271 lb)   08/09/24 123.4 kg (272 lb)     General Appearance:   no distress, normal body habitus   ENT/Mouth: membranes moist, no oral lesions or bleeding gums.      EYES:  no scleral icterus, normal conjunctivae   Neck: no carotid bruits or thyromegaly   Chest/Lungs:   lungs are clear to auscultation, no rales or wheezing, equal chest wall expansion except noted crackles in bilateral bases   Cardiovascular:   Irregularly irregular; Normal first and second heart sounds with no murmurs, rubs, or gallops; the carotid, radial and posterior tibial pulses are intact, JVP is difficult to assess due to the patient's obesity and body habitus   , mild edema bilaterally    Abdomen:  Large but soft; organomegaly, masses, bruits, or tenderness; bowel sounds are present   Extremities   no cyanosis or clubbing; CMS intact   Skin: no xanthelasma, warm.    Neurologic: normal  bilateral, no tremors   Psychiatric: alert and oriented x3, calm                                                    Negative unless noted in HPI     Medical History  Surgical History Family History Social History   Past Medical History:   Diagnosis Date    Hypertension     Past Surgical History:   Procedure Laterality Date    COLONOSCOPY      COLONOSCOPY N/A 5/3/2024    Procedure: COLONOSCOPY with POLYPECTOMY;  Surgeon: Barry Hayes MD;  Location: [facility] OR    GENITOURINARY SURGERY      No family history on file. Social History     Socioeconomic History    Marital status:      Spouse name: Not on file    Number of children: Not on file    Years of education: Not on file    Highest education level: Not on file   Occupational History    Not on file   Tobacco Use    Smoking status: Former     Current packs/day: 0.00     Types: Cigarettes     Quit date:  "1986     Years since quittin.8    Smokeless tobacco: Never   Vaping Use    Vaping status: Never Used   Substance and Sexual Activity    Alcohol use: Yes     Comment: minimal    Drug use: Never    Sexual activity: Not on file   Other Topics Concern    Not on file   Social History Narrative    Not on file     Social Drivers of Health     Financial Resource Strain: Not on file   Food Insecurity: Not on file   Transportation Needs: Not on file   Physical Activity: Not on file   Stress: Not on file   Social Connections: Not on file   Interpersonal Safety: Not on file   Housing Stability: Not on file          Medications  Allergies   Current Outpatient Medications   Medication Sig Dispense Refill    furosemide (LASIX) 20 MG tablet Take 2 tablets (40 mg) by mouth daily 180 tablet 1    lisinopril (ZESTRIL) 5 MG tablet Take 1 tablet (5 mg) by mouth daily 90 tablet 3    metoprolol succinate ER (TOPROL XL) 50 MG 24 hr tablet Take 1 tablet (50 mg) by mouth daily 90 tablet 3    rivaroxaban ANTICOAGULANT (XARELTO) 20 MG TABS tablet Take 1 tablet (20 mg) by mouth daily (with dinner) 30 tablet 11    spironolactone (ALDACTONE) 25 MG tablet Take 1 tablet (25 mg) by mouth daily 90 tablet 3    zolpidem (AMBIEN) 10 MG tablet Take tablet by mouth 15 minutes prior to sleep, for Sleep Study (Patient not taking: Reported on 10/23/2024) 1 tablet 0    No Known Allergies      Lab Results    Chemistry/lipid CBC Cardiac Enzymes/BNP/TSH/INR   Lab Results   Component Value Date    CHOL 124 2024    HDL 44 2024    TRIG 66 2024    BUN 18.7 2024     2024    CO2 28 2024    No results found for: \"WBC\", \"HGB\", \"HCT\", \"MCV\", \"PLT\" No results found for: \"CKTOTAL\", \"CKMB\", \"TROPONINI\", \"BNP\", \"TSH\", \"INR\"     25 minutes spent on the date of encounter doing chart review, review of test results, interpretation with above tests, patient visit, and documentation.        This note has been dictated using voice " recognition software. Any grammatical, typographical, or context distortions are unintentional and inherent to the software

## 2024-10-23 ENCOUNTER — VIRTUAL VISIT (OUTPATIENT)
Dept: CARDIOLOGY | Facility: CLINIC | Age: 84
End: 2024-10-23
Payer: COMMERCIAL

## 2024-10-23 ENCOUNTER — PREP FOR PROCEDURE (OUTPATIENT)
Dept: CARDIOLOGY | Facility: CLINIC | Age: 84
End: 2024-10-23

## 2024-10-23 ENCOUNTER — LAB (OUTPATIENT)
Dept: CARDIOLOGY | Facility: CLINIC | Age: 84
End: 2024-10-23
Payer: COMMERCIAL

## 2024-10-23 ENCOUNTER — OFFICE VISIT (OUTPATIENT)
Dept: CARDIOLOGY | Facility: CLINIC | Age: 84
End: 2024-10-23
Payer: COMMERCIAL

## 2024-10-23 VITALS
RESPIRATION RATE: 20 BRPM | DIASTOLIC BLOOD PRESSURE: 76 MMHG | SYSTOLIC BLOOD PRESSURE: 108 MMHG | BODY MASS INDEX: 37.04 KG/M2 | HEART RATE: 103 BPM | WEIGHT: 273.1 LBS | OXYGEN SATURATION: 96 %

## 2024-10-23 DIAGNOSIS — I43 TACHYCARDIA INDUCED CARDIOMYOPATHY (H): ICD-10-CM

## 2024-10-23 DIAGNOSIS — I48.19 PERSISTENT ATRIAL FIBRILLATION (H): Primary | ICD-10-CM

## 2024-10-23 DIAGNOSIS — R00.0 TACHYCARDIA INDUCED CARDIOMYOPATHY (H): ICD-10-CM

## 2024-10-23 DIAGNOSIS — I48.19 PERSISTENT ATRIAL FIBRILLATION (H): ICD-10-CM

## 2024-10-23 DIAGNOSIS — E66.01 MORBID OBESITY (H): ICD-10-CM

## 2024-10-23 DIAGNOSIS — G47.33 OSA (OBSTRUCTIVE SLEEP APNEA): ICD-10-CM

## 2024-10-23 DIAGNOSIS — I50.20 HFREF (HEART FAILURE WITH REDUCED EJECTION FRACTION) (H): Primary | ICD-10-CM

## 2024-10-23 LAB
ANION GAP SERPL CALCULATED.3IONS-SCNC: 11 MMOL/L (ref 7–15)
BUN SERPL-MCNC: 21.5 MG/DL (ref 8–23)
CALCIUM SERPL-MCNC: 8.8 MG/DL (ref 8.8–10.4)
CHLORIDE SERPL-SCNC: 100 MMOL/L (ref 98–107)
CREAT SERPL-MCNC: 1.16 MG/DL (ref 0.67–1.17)
EGFRCR SERPLBLD CKD-EPI 2021: 62 ML/MIN/1.73M2
ERYTHROCYTE [DISTWIDTH] IN BLOOD BY AUTOMATED COUNT: 12.8 % (ref 10–15)
GLUCOSE SERPL-MCNC: 88 MG/DL (ref 70–99)
HCO3 SERPL-SCNC: 27 MMOL/L (ref 22–29)
HCT VFR BLD AUTO: 46.3 % (ref 40–53)
HGB BLD-MCNC: 15.5 G/DL (ref 13.3–17.7)
MCH RBC QN AUTO: 31.4 PG (ref 26.5–33)
MCHC RBC AUTO-ENTMCNC: 33.5 G/DL (ref 31.5–36.5)
MCV RBC AUTO: 94 FL (ref 78–100)
PLATELET # BLD AUTO: 286 10E3/UL (ref 150–450)
POTASSIUM SERPL-SCNC: 4.5 MMOL/L (ref 3.4–5.3)
RBC # BLD AUTO: 4.93 10E6/UL (ref 4.4–5.9)
SODIUM SERPL-SCNC: 138 MMOL/L (ref 135–145)
WBC # BLD AUTO: 6.5 10E3/UL (ref 4–11)

## 2024-10-23 PROCEDURE — 93000 ELECTROCARDIOGRAM COMPLETE: CPT | Performed by: STUDENT IN AN ORGANIZED HEALTH CARE EDUCATION/TRAINING PROGRAM

## 2024-10-23 PROCEDURE — G2211 COMPLEX E/M VISIT ADD ON: HCPCS | Performed by: NURSE PRACTITIONER

## 2024-10-23 PROCEDURE — 99207 PR NO CHARGE NURSE ONLY: CPT | Mod: 93

## 2024-10-23 PROCEDURE — 85027 COMPLETE CBC AUTOMATED: CPT

## 2024-10-23 PROCEDURE — 80048 BASIC METABOLIC PNL TOTAL CA: CPT

## 2024-10-23 PROCEDURE — 36415 COLL VENOUS BLD VENIPUNCTURE: CPT

## 2024-10-23 PROCEDURE — 99213 OFFICE O/P EST LOW 20 MIN: CPT | Performed by: NURSE PRACTITIONER

## 2024-10-23 RX ORDER — LIDOCAINE 40 MG/G
CREAM TOPICAL
OUTPATIENT
Start: 2024-10-23

## 2024-10-23 RX ORDER — PANTOPRAZOLE SODIUM 40 MG/1
40 TABLET, DELAYED RELEASE ORAL DAILY
Qty: 45 TABLET | Refills: 0 | Status: SHIPPED | OUTPATIENT
Start: 2024-10-23

## 2024-10-23 NOTE — PATIENT INSTRUCTIONS
IMPORTANT INFORMATION REGARDING YOUR      PULMONARY VEIN ISOLATION ABLATION     Pulmonary Vein Isolation Ablation : 10-28-24 with Dr. Vignesh Alicia    Covid testing is no longer required prior to your procedure.  If you have signs and symptoms of Covid-19 please take a home test 1-2 days prior and contact our clinic if results are positive at the number listed below.  Please arrive at 10:00 for registration and prep.  Have nothing to eat or drink after midnight the night prior to your ablation.  Please take your morning medications, INCLUDING Xarelto with a small sip of water the morning of your ablation.  Please do not take Spironolactone, Lasix, vitamins, minerals or supplements.  If you use a CPAP, please bring this with you to the hospital.  The hospital staff have asked that you arrange for your family/health  to be available to pick you up in the afternoon on the day of your procedure if you are able to be discharged that same day, or if you are required to spend the night in the hospital at 9:00am the following morning.    Medication Changes :    Please start Protonix 40 mg once daily on Friday 10-25-24;  this is to prevent possible irritation to your esophagus from the procedure, you will need to continue for 6 weeks after the procedure.    It is important to remain on your anticoagulation medication (Xarelto) uninterrupted before and after your ablation, PLEASE DO NOT STOP THIS MEDICATION or skip any doses.  Please take this medication daily with a full meal.      Postoperative Information :    Please plan on taking 5-7 days after the procedure for recuperation. You may experience fatigue, shortness of breath with activity or mild chest discomfort after the procedure.  Please notify our team if you are having uncomfortable amounts of any of these.  We ask that you do not drive until you have had your telephone post op call.  This is scheduled on 10-31-24.  Detailed information regarding discharge  instructions will be given to you when you leave the hospital.      Contact Information :     Please call Dr. Alicia's nursing team during office hours M-F 8:00 to 4:00, with any questions or concerns regarding the procedure at :  [phone].  Scheduling questions or concerns:  [phone]  .  After hours include evenings after 5:00 pm, weekends and holidays, please call this number to reach an on call Cardiologist:  [phone]                        Thank you for choosing [facility].

## 2024-10-23 NOTE — LETTER
10/23/2024    Ravin Dockery MD  [facility] [address] [place] [address]    RE: Ravin Swanson       Dear Colleague,     I had the pleasure of seeing Ravin Swanson in the [facility].          Assessment/Recommendations   Assessment:    1.  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    Current home weight is 266 lbs. Weight is down 12 pounds with improvement in heart failure status.    Shortness of breath on exertion is unchanged from baseline.     Heart failure regimen includes:    -Beta-blocker therapy with metoprolol succinate 50 mg daily    -ACE inhibitor with lisinopril 5 mg daily    -Aldosterone blocker therapy with spironolactone 25 mg daily    -Not on SGLT2 Inhibitor     -Diuretic therapy with furosemide 40 mg daily    Stable lab in June 2024 except creatinine was mildly elevated 1.24.  Previously 1.23.    2.  Persistent Atrial fibrillation: Mild RVR heart rate 103 bpm per EKG.  Patient is scheduled for  PVI ablation on 10/20/2024.    3.  Morbid obesity with BMI of 37.76    4.  Possible obstructive sleep apnea: Patient had a sleep evaluation consultation.  Patient scheduled for sleep study in December.  He is thinking about canceling it.  We discussed in length about the importance of having further sleep evaluation.  We also discussed about the correlation between untreated sleep apnea, atrial fibrillation, obesity, and heart failure.    Plan/Recommendation:  -Labs per EP for upcoming PVI ablation  -Continue current heart failure medication  -Continue heart failure diet, daily weight monitoring, and adequate fluid intake    He will follow-up with Dr. Landrum in January 2024.  Follow-up with me in 4-5 months     The longitudinal plan of care for cardiomyopathy likely tachycardia mediated, heart failure with reduced ejection fraction was addressed during this  "visit.?Due to the added complexity in care, I will continue to support Ravin Swanson in the subsequent management of this condition(s) and with the ongoing continuity of care of this condition(s)\".     History of Present Illness/Subjective    Mr. Ravin Swanson is a 84 year old male with a past medical history of morbid obesity, COVID infection in November 2023, dyspnea on exertion, and tachycardia mediated cardiomyopathy, persistent atrial fibrillation and chronic systolic heart failure who is seen at [facility] for continued heart failure follow-up.    During last heart failure clinic visit, patient weight is up 16 pounds.  He was found hypervolemic on exam.  His diuretic dose was increased.      Today, Bill reports 10 pounds weight loss.  However, he continues to have shortness of breath on exertion especially walking up incline which is unchanged from baseline.  He does have some fatigue.  His lower extremity edema has resolved.  He denies lightheadedness, shortness of breath, orthopnea, PND, palpitations, chest pain, abdominal fullness/bloating, and lower extremity edema.      He has been experiencing some runny nose for a while.  He denies fever or chills.    He continues to go to gym and workout 5-6 times a week for about 45 minutes at a time.    He reports following low-sodium diet.  He reports adequate fluid intake    ECHO from 1/8/24-Reviewed:   Interpretation Summary   The left ventricle is normal in size.  There is moderate concentric left ventricular hypertrophy.  The visual ejection fraction is 40-45%.  There is mild-moderate global hypokinesia of the left ventricle.  The right ventricle is mildly dilated.  Moderately decreased right ventricular systolic function  The left atrium is severely dilated.  The right ventricular systolic pressure is approximated at 30mmHg plus the  right atrial pressure.  Ascending Aorta dilatation is present.  The rhythm was rapid " atrial fibrillation.  There is no comparison study available.     Physical Examination Review of Systems   /76 (BP Location: Right arm, Patient Position: Sitting, Cuff Size: Adult Large)   Pulse 103   Resp 20   Wt 123.9 kg (273 lb 1.6 oz)   SpO2 96%   BMI 37.04 kg/m    Body mass index is 37.04 kg/m .  Wt Readings from Last 3 Encounters:   10/23/24 123.9 kg (273 lb 1.6 oz)   10/02/24 122.9 kg (271 lb)   08/09/24 123.4 kg (272 lb)     General Appearance:   no distress, normal body habitus   ENT/Mouth: membranes moist, no oral lesions or bleeding gums.      EYES:  no scleral icterus, normal conjunctivae   Neck: no carotid bruits or thyromegaly   Chest/Lungs:   lungs are clear to auscultation, no rales or wheezing, equal chest wall expansion except noted crackles in bilateral bases   Cardiovascular:   Irregularly irregular; Normal first and second heart sounds with no murmurs, rubs, or gallops; the carotid, radial and posterior tibial pulses are intact, JVP is difficult to assess due to the patient's obesity and body habitus   , mild edema bilaterally    Abdomen:  Large but soft; organomegaly, masses, bruits, or tenderness; bowel sounds are present   Extremities   no cyanosis or clubbing; CMS intact   Skin: no xanthelasma, warm.    Neurologic: normal  bilateral, no tremors   Psychiatric: alert and oriented x3, calm                                                    Negative unless noted in HPI     Medical History  Surgical History Family History Social History   Past Medical History:   Diagnosis Date     Hypertension     Past Surgical History:   Procedure Laterality Date     COLONOSCOPY       COLONOSCOPY N/A 5/3/2024    Procedure: COLONOSCOPY with POLYPECTOMY;  Surgeon: Barry Hayes MD;  Location: [facility] OR     GENITOURINARY SURGERY      No family history on file. Social History     Socioeconomic History     Marital status:      Spouse name: Not on file     Number of children: Not  "on file     Years of education: Not on file     Highest education level: Not on file   Occupational History     Not on file   Tobacco Use     Smoking status: Former     Current packs/day: 0.00     Types: Cigarettes     Quit date:      Years since quittin.8     Smokeless tobacco: Never   Vaping Use     Vaping status: Never Used   Substance and Sexual Activity     Alcohol use: Yes     Comment: minimal     Drug use: Never     Sexual activity: Not on file   Other Topics Concern     Not on file   Social History Narrative     Not on file     Social Drivers of Health     Financial Resource Strain: Not on file   Food Insecurity: Not on file   Transportation Needs: Not on file   Physical Activity: Not on file   Stress: Not on file   Social Connections: Not on file   Interpersonal Safety: Not on file   Housing Stability: Not on file          Medications  Allergies   Current Outpatient Medications   Medication Sig Dispense Refill     furosemide (LASIX) 20 MG tablet Take 2 tablets (40 mg) by mouth daily 180 tablet 1     lisinopril (ZESTRIL) 5 MG tablet Take 1 tablet (5 mg) by mouth daily 90 tablet 3     metoprolol succinate ER (TOPROL XL) 50 MG 24 hr tablet Take 1 tablet (50 mg) by mouth daily 90 tablet 3     rivaroxaban ANTICOAGULANT (XARELTO) 20 MG TABS tablet Take 1 tablet (20 mg) by mouth daily (with dinner) 30 tablet 11     spironolactone (ALDACTONE) 25 MG tablet Take 1 tablet (25 mg) by mouth daily 90 tablet 3     zolpidem (AMBIEN) 10 MG tablet Take tablet by mouth 15 minutes prior to sleep, for Sleep Study (Patient not taking: Reported on 10/23/2024) 1 tablet 0    No Known Allergies      Lab Results    Chemistry/lipid CBC Cardiac Enzymes/BNP/TSH/INR   Lab Results   Component Value Date    CHOL 124 2024    HDL 44 2024    TRIG 66 2024    BUN 18.7 2024     2024    CO2 28 2024    No results found for: \"WBC\", \"HGB\", \"HCT\", \"MCV\", \"PLT\" No results found for: \"CKTOTAL\", \"CKMB\", " "\"TROPONINI\", \"BNP\", \"TSH\", \"INR\"     25 minutes spent on the date of encounter doing chart review, review of test results, interpretation with above tests, patient visit, and documentation.        This note has been dictated using voice recognition software. Any grammatical, typographical, or context distortions are unintentional and inherent to the software          Thank you for allowing me to participate in the care of your patient.      Sincerely,     DARLENE Vaughn CNP     [facility]  cc:   DARLENE Vaughn CNP  [address]  [place] [address]      "

## 2024-10-23 NOTE — PROGRESS NOTES
Reviewed with Dr. Alicia:  Recommend that we plan for [place] [facility] [place].

## 2024-10-23 NOTE — PATIENT INSTRUCTIONS
Ravin Swanson,    It was a pleasure to see you today at the [facility].     My recommendations after this visit include:    - No medications changes made today    - Low sodium diet < 2000 mg/day, daily weight monitoring, and maintain adequate fluid intake at 50 to 60 ounces per day    -Please call if you experience persistent weight gain 2 to 3 pounds 2 days in a row or 5 pounds in a week with shortness of breath, abdominal bloating and leg swelling    - Follow up with Dr. Landrum in 3 months  as recommended    - Follow up with Shalom Chi CNP in 5-6 months     - Please call Heart Failure Nurse Line at [phone], if you have any questions or concerns

## 2024-10-23 NOTE — PROGRESS NOTES
You  Isak Alicia MD1 hour ago (2:49 PM)     CJ  Dr. Alicia,  Pt has not been taking Xarelto with a full meal.  CHADSVASc 4 for age >75, HF.  He appears to be persistently in afib.  Scheduled for PVI on Monday 10-28-24.  Would you like any additional testing prior?  Thank you!  Sommer

## 2024-10-23 NOTE — PROGRESS NOTES
Pre-Procedure Pulmonary Vein Ablation (AF) Education    Procedure: PVI with Dr Alicia on 10-28-24 with arrival time 10:00 am    COVID: Pt denies COVID like symptoms, and is aware if he/she develops COVID like symptoms they would need to complete an at home with a rapid antigen COVID test 1-2 days prior to your procedure date. If COVID + pt is aware the procedure will need to be rescheduled, and to contact CV scheduling as soon as possible    Type & Screen: Is not required for PVI Ablation    Pre-Op H&P: Completed on 10-2-24 in Epic    Education:   Reviewed via phone with pt  Pre-Procedure Instruction: NPO after midnight pre procedure, Defined NPO, Remove all jewelry and leave all valuables at home, Shower prior to arrival, Anesthesia and intubation plan/orders, Intra-procedure PVI process, Post- PVI procedure expectations/recovery, Transportation requirements and arrangements post procedure, Post-procedure follow up process, Letter sent to pt via [employer] and mail with written instructions (Refer to letters tab), Lab results would be called to pt if abnormal  Risks:   Atrial Fibrillation Ablation/Left Atrial Ablation  Cardiac Ablation  <1% Hypotension, Hemorrhage, Thrombophlebitis, Systemic or pulmonic emboli, Cardiac perforation (tamponade), Infection, Pneumothorax, Arrhythmias, Proarrhythmic effects of drugs, Radiation exposure, Catheter entrapment  <1 % Vascular injury including perforation of vein, artery or heart  1-2% Tamponade and Aortic puncture with left sided transeptal approach  1% CVA   <1% MI  <0.1% death  If external defibrillation or CV is needed, 25% risk for superficial burn  Risks associated with general anesthesia will be addressed by the Anesthesiology Department  Radiofrequency Risks:  In addition to standard risks for Radiofrequency Ablation, there is:  <2% Significant pulmonary vein stenosis  <2% Embolic events  <1% Esophageal fistula  <1% Phrenic nerve paralysis   Cryoablation Risks:  In  addition to standard risks for Cryoablation Ablation, there is:  <1% Phrenic nerve paralysis  <1% Pulmonary vein stenosis  <1% Esophageal fistula    Medication:   Instructions regarding anticoagulants: Xarelto- Continue anticoagulation uninterrupted through their procedure, do not miss any doses of AC prior to procedure, importance of taking AC for stroke prevention, taking AC as prescribed, to call prior to PVI if missed a dose of AC, and if upon arrival pt reports missing a dose of AC PVI will potentially be cnx/postponed.  Pt has not been taking Xarelto with food, has been taking on an empty stomach each morning well before breakfast, message sent to Dr. Alicia.  Instructions given to pt regarding antiarrhythmic medication:  Metoprolol - continue medication prior to procedure as prescribed  Instructions given to pt regarding PPI medication: Start Protonix 40mg Daily 3 days prior, 6wk post  Instructions given to pt regarding diuretics medication: Hold oral diuretics AM of procedure- spironolactone and Lasix  Instructions given to pt regarding DM/GLP-1 medication:   DM- None  GLP-1- None  Instructions for medication, other than anticoagulants and antiarrhythmics listed above, given to pt: Take all medication AM of procedure with small sips of water     Important patient information for staff: None    10/23/2024 2:10 PM  Sommer Fang RN

## 2024-10-24 LAB
ATRIAL RATE - MUSE: 69 BPM
DIASTOLIC BLOOD PRESSURE - MUSE: NORMAL MMHG
INTERPRETATION ECG - MUSE: NORMAL
P AXIS - MUSE: NORMAL DEGREES
PR INTERVAL - MUSE: NORMAL MS
QRS DURATION - MUSE: 104 MS
QT - MUSE: 364 MS
QTC - MUSE: 476 MS
R AXIS - MUSE: -32 DEGREES
SYSTOLIC BLOOD PRESSURE - MUSE: NORMAL MMHG
T AXIS - MUSE: -3 DEGREES
VENTRICULAR RATE- MUSE: 103 BPM

## 2024-10-24 NOTE — PROGRESS NOTES
Arlette Rosa; Sammi Veliz, RN1 hour ago (3:06 PM)     Sammi Le!    This works for [facility] as well. I appreciate your help with this.    Sommer - pt arrival time will not have to change per Ashley.    Sammi Mcdonnell, SUNNY Campbell; Arlette Rosa S3 hours ago (12:30 PM)       Magdy Chong,    Yes, we should be able to do the ELY in [facility] at 1130 MD time if he is admitting at 10AM.  Please confirm that this will work and I will have Porsha get it on the schedule.    Sammi Wright Angelica S7 hours ago (9:18 AM)       Thank you so much!!!      Arlette Rosa; Sammi Veliz, RN8 hours ago (7:55 AM)     BEAR Novoa,    I have cc'd Sammi to see if we are able.    Sammi - pt arrival time is is 10AM (4th case of the day) for a procedure time of 1PM. Please see message below.    Thank Arlette campbell  [facility] Procedure  [place] - [facility] (4:07 PM)     [facility] there!  Any way to add a ELY to this patients PVI schedule on Monday 10-28?  Thank you!!!!  Pt is not yet aware that this is needed, wanting to review with you first!  Sommer

## 2024-10-24 NOTE — PROGRESS NOTES
Noted.  Phone call to patient to discuss the addition of ELY to his procedure, due to taking Xarelto on an empty stomach.  No change in arrival time, order and order sets placed.    No answer and will await return call from patient.

## 2024-10-25 NOTE — PROGRESS NOTES
Spoke with patient this morning and discussed reasoning behind needing ELY.  Patient also inquired about medications to hold a.m. of procedure.  Confirmed that patient will hold his Lasix and spironolactone.  Patient verbalized understanding of conversation and has no further questions at this time.    Ava Rae RN

## 2024-10-28 ENCOUNTER — HOSPITAL ENCOUNTER (OUTPATIENT)
Facility: HOSPITAL | Age: 84
Discharge: HOME | End: 2024-10-28
Attending: INTERNAL MEDICINE | Admitting: INTERNAL MEDICINE
Payer: COMMERCIAL

## 2024-10-28 ENCOUNTER — ANESTHESIA EVENT (OUTPATIENT)
Dept: CARDIOLOGY | Facility: HOSPITAL | Age: 84
End: 2024-10-28
Payer: COMMERCIAL

## 2024-10-28 ENCOUNTER — HOSPITAL ENCOUNTER (OUTPATIENT)
Dept: CARDIOLOGY | Facility: HOSPITAL | Age: 84
Discharge: HOME | End: 2024-10-28
Attending: INTERNAL MEDICINE | Admitting: INTERNAL MEDICINE
Payer: COMMERCIAL

## 2024-10-28 ENCOUNTER — ANESTHESIA (OUTPATIENT)
Dept: CARDIOLOGY | Facility: HOSPITAL | Age: 84
End: 2024-10-28
Payer: COMMERCIAL

## 2024-10-28 VITALS
WEIGHT: 273 LBS | TEMPERATURE: 98 F | RESPIRATION RATE: 16 BRPM | OXYGEN SATURATION: 94 % | SYSTOLIC BLOOD PRESSURE: 112 MMHG | BODY MASS INDEX: 36.98 KG/M2 | HEIGHT: 72 IN | DIASTOLIC BLOOD PRESSURE: 71 MMHG | HEART RATE: 86 BPM

## 2024-10-28 DIAGNOSIS — I48.19 PERSISTENT ATRIAL FIBRILLATION (H): ICD-10-CM

## 2024-10-28 LAB
ACT BLD: 463 SECONDS (ref 74–150)
ACT BLD: 513 SECONDS (ref 74–150)
ANION GAP SERPL CALCULATED.3IONS-SCNC: 9 MMOL/L (ref 7–15)
ATRIAL RATE - MUSE: 79 BPM
BUN SERPL-MCNC: 18.1 MG/DL (ref 8–23)
CALCIUM SERPL-MCNC: 8.8 MG/DL (ref 8.8–10.4)
CHLORIDE SERPL-SCNC: 103 MMOL/L (ref 98–107)
CREAT SERPL-MCNC: 1.24 MG/DL (ref 0.67–1.17)
DIASTOLIC BLOOD PRESSURE - MUSE: NORMAL MMHG
EGFRCR SERPLBLD CKD-EPI 2021: 57 ML/MIN/1.73M2
ERYTHROCYTE [DISTWIDTH] IN BLOOD BY AUTOMATED COUNT: 12.8 % (ref 10–15)
GLUCOSE SERPL-MCNC: 103 MG/DL (ref 70–99)
HCO3 SERPL-SCNC: 27 MMOL/L (ref 22–29)
HCT VFR BLD AUTO: 49 % (ref 40–53)
HGB BLD-MCNC: 15.9 G/DL (ref 13.3–17.7)
INTERPRETATION ECG - MUSE: NORMAL
LVEF ECHO: NORMAL
MCH RBC QN AUTO: 30.9 PG (ref 26.5–33)
MCHC RBC AUTO-ENTMCNC: 32.4 G/DL (ref 31.5–36.5)
MCV RBC AUTO: 95 FL (ref 78–100)
P AXIS - MUSE: 55 DEGREES
PLATELET # BLD AUTO: 285 10E3/UL (ref 150–450)
POTASSIUM SERPL-SCNC: 4.9 MMOL/L (ref 3.4–5.3)
PR INTERVAL - MUSE: 206 MS
QRS DURATION - MUSE: 106 MS
QT - MUSE: 422 MS
QTC - MUSE: 483 MS
R AXIS - MUSE: -24 DEGREES
RBC # BLD AUTO: 5.15 10E6/UL (ref 4.4–5.9)
SODIUM SERPL-SCNC: 139 MMOL/L (ref 135–145)
SYSTOLIC BLOOD PRESSURE - MUSE: NORMAL MMHG
T AXIS - MUSE: -6 DEGREES
VENTRICULAR RATE- MUSE: 79 BPM
WBC # BLD AUTO: 6.1 10E3/UL (ref 4–11)

## 2024-10-28 PROCEDURE — 93615 ESOPHAGEAL RECORDING: CPT | Performed by: INTERNAL MEDICINE

## 2024-10-28 PROCEDURE — 36415 COLL VENOUS BLD VENIPUNCTURE: CPT | Performed by: INTERNAL MEDICINE

## 2024-10-28 PROCEDURE — 93325 DOPPLER ECHO COLOR FLOW MAPG: CPT

## 2024-10-28 PROCEDURE — 80048 BASIC METABOLIC PNL TOTAL CA: CPT | Performed by: INTERNAL MEDICINE

## 2024-10-28 PROCEDURE — 99152 MOD SED SAME PHYS/QHP 5/>YRS: CPT | Performed by: INTERNAL MEDICINE

## 2024-10-28 PROCEDURE — C1887 CATHETER, GUIDING: HCPCS | Performed by: INTERNAL MEDICINE

## 2024-10-28 PROCEDURE — 999N000054 HC STATISTIC EKG NON-CHARGEABLE

## 2024-10-28 PROCEDURE — 710N000010 HC RECOVERY PHASE 1, LEVEL 2, PER MIN

## 2024-10-28 PROCEDURE — 93656 COMPRE EP EVAL ABLTJ ATR FIB: CPT | Performed by: ANESTHESIOLOGY

## 2024-10-28 PROCEDURE — C1766 INTRO/SHEATH,STRBLE,NON-PEEL: HCPCS | Performed by: INTERNAL MEDICINE

## 2024-10-28 PROCEDURE — C1759 CATH, INTRA ECHOCARDIOGRAPHY: HCPCS | Performed by: INTERNAL MEDICINE

## 2024-10-28 PROCEDURE — C1769 GUIDE WIRE: HCPCS | Performed by: INTERNAL MEDICINE

## 2024-10-28 PROCEDURE — 250N000009 HC RX 250: Performed by: NURSE ANESTHETIST, CERTIFIED REGISTERED

## 2024-10-28 PROCEDURE — 93010 ELECTROCARDIOGRAM REPORT: CPT | Performed by: STUDENT IN AN ORGANIZED HEALTH CARE EDUCATION/TRAINING PROGRAM

## 2024-10-28 PROCEDURE — 258N000003 HC RX IP 258 OP 636: Performed by: NURSE ANESTHETIST, CERTIFIED REGISTERED

## 2024-10-28 PROCEDURE — 93312 ECHO TRANSESOPHAGEAL: CPT | Mod: 26 | Performed by: INTERNAL MEDICINE

## 2024-10-28 PROCEDURE — 93656 COMPRE EP EVAL ABLTJ ATR FIB: CPT | Performed by: NURSE ANESTHETIST, CERTIFIED REGISTERED

## 2024-10-28 PROCEDURE — C1730 CATH, EP, 19 OR FEW ELECT: HCPCS | Performed by: INTERNAL MEDICINE

## 2024-10-28 PROCEDURE — 250N000011 HC RX IP 250 OP 636: Performed by: INTERNAL MEDICINE

## 2024-10-28 PROCEDURE — 250N000009 HC RX 250: Performed by: INTERNAL MEDICINE

## 2024-10-28 PROCEDURE — 93656 COMPRE EP EVAL ABLTJ ATR FIB: CPT | Performed by: INTERNAL MEDICINE

## 2024-10-28 PROCEDURE — 85347 COAGULATION TIME ACTIVATED: CPT

## 2024-10-28 PROCEDURE — C1733 CATH, EP, OTHR THAN COOL-TIP: HCPCS | Performed by: INTERNAL MEDICINE

## 2024-10-28 PROCEDURE — 93320 DOPPLER ECHO COMPLETE: CPT | Mod: 26 | Performed by: INTERNAL MEDICINE

## 2024-10-28 PROCEDURE — 93615 ESOPHAGEAL RECORDING: CPT | Mod: 26 | Performed by: INTERNAL MEDICINE

## 2024-10-28 PROCEDURE — 370N000017 HC ANESTHESIA TECHNICAL FEE, PER MIN: Performed by: INTERNAL MEDICINE

## 2024-10-28 PROCEDURE — 93005 ELECTROCARDIOGRAM TRACING: CPT

## 2024-10-28 PROCEDURE — 250N000011 HC RX IP 250 OP 636: Performed by: NURSE ANESTHETIST, CERTIFIED REGISTERED

## 2024-10-28 PROCEDURE — 272N000001 HC OR GENERAL SUPPLY STERILE: Performed by: INTERNAL MEDICINE

## 2024-10-28 PROCEDURE — 85014 HEMATOCRIT: CPT | Performed by: INTERNAL MEDICINE

## 2024-10-28 PROCEDURE — 93325 DOPPLER ECHO COLOR FLOW MAPG: CPT | Mod: 26 | Performed by: INTERNAL MEDICINE

## 2024-10-28 PROCEDURE — 93623 PRGRMD STIMJ&PACG IV RX NFS: CPT | Performed by: INTERNAL MEDICINE

## 2024-10-28 PROCEDURE — 99100 ANES PT EXTEME AGE<1 YR&>70: CPT | Performed by: NURSE ANESTHETIST, CERTIFIED REGISTERED

## 2024-10-28 PROCEDURE — C1732 CATH, EP, DIAG/ABL, 3D/VECT: HCPCS | Performed by: INTERNAL MEDICINE

## 2024-10-28 RX ORDER — LIDOCAINE 40 MG/G
CREAM TOPICAL
Status: CANCELLED | OUTPATIENT
Start: 2024-10-28

## 2024-10-28 RX ORDER — HEPARIN SODIUM 1000 [USP'U]/ML
INJECTION, SOLUTION INTRAVENOUS; SUBCUTANEOUS
Status: DISCONTINUED | OUTPATIENT
Start: 2024-10-28 | End: 2024-10-28 | Stop reason: HOSPADM

## 2024-10-28 RX ORDER — LIDOCAINE HYDROCHLORIDE 10 MG/ML
INJECTION, SOLUTION INFILTRATION; PERINEURAL PRN
Status: DISCONTINUED | OUTPATIENT
Start: 2024-10-28 | End: 2024-10-28

## 2024-10-28 RX ORDER — LIDOCAINE 40 MG/G
CREAM TOPICAL
Status: DISCONTINUED | OUTPATIENT
Start: 2024-10-28 | End: 2024-10-28 | Stop reason: HOSPADM

## 2024-10-28 RX ORDER — ONDANSETRON 4 MG/1
4 TABLET, ORALLY DISINTEGRATING ORAL EVERY 6 HOURS PRN
Status: DISCONTINUED | OUTPATIENT
Start: 2024-10-28 | End: 2024-10-28 | Stop reason: HOSPADM

## 2024-10-28 RX ORDER — BUPIVACAINE HYDROCHLORIDE 2.5 MG/ML
INJECTION, SOLUTION EPIDURAL; INFILTRATION; INTRACAUDAL
Status: DISCONTINUED | OUTPATIENT
Start: 2024-10-28 | End: 2024-10-28 | Stop reason: HOSPADM

## 2024-10-28 RX ORDER — LIDOCAINE HYDROCHLORIDE AND EPINEPHRINE 10; 10 MG/ML; UG/ML
INJECTION, SOLUTION INFILTRATION; PERINEURAL
Status: DISCONTINUED | OUTPATIENT
Start: 2024-10-28 | End: 2024-10-28 | Stop reason: HOSPADM

## 2024-10-28 RX ORDER — ACETAMINOPHEN 325 MG/1
650 TABLET ORAL EVERY 4 HOURS PRN
OUTPATIENT
Start: 2024-10-28 | End: 2024-10-30

## 2024-10-28 RX ORDER — ADENOSINE 3 MG/ML
INJECTION, SOLUTION INTRAVENOUS
Status: DISCONTINUED | OUTPATIENT
Start: 2024-10-28 | End: 2024-10-28 | Stop reason: HOSPADM

## 2024-10-28 RX ORDER — MAGNESIUM HYDROXIDE/ALUMINUM HYDROXICE/SIMETHICONE 120; 1200; 1200 MG/30ML; MG/30ML; MG/30ML
30 SUSPENSION ORAL EVERY 8 HOURS PRN
OUTPATIENT
Start: 2024-10-28 | End: 2024-10-30

## 2024-10-28 RX ORDER — SODIUM CHLORIDE, SODIUM LACTATE, POTASSIUM CHLORIDE, CALCIUM CHLORIDE 600; 310; 30; 20 MG/100ML; MG/100ML; MG/100ML; MG/100ML
INJECTION, SOLUTION INTRAVENOUS CONTINUOUS PRN
Status: DISCONTINUED | OUTPATIENT
Start: 2024-10-28 | End: 2024-10-28

## 2024-10-28 RX ORDER — ONDANSETRON 2 MG/ML
4 INJECTION INTRAMUSCULAR; INTRAVENOUS EVERY 6 HOURS PRN
Status: DISCONTINUED | OUTPATIENT
Start: 2024-10-28 | End: 2024-10-28 | Stop reason: HOSPADM

## 2024-10-28 RX ORDER — SODIUM CHLORIDE, SODIUM LACTATE, POTASSIUM CHLORIDE, CALCIUM CHLORIDE 600; 310; 30; 20 MG/100ML; MG/100ML; MG/100ML; MG/100ML
INJECTION, SOLUTION INTRAVENOUS CONTINUOUS
Status: CANCELLED | OUTPATIENT
Start: 2024-10-28

## 2024-10-28 RX ORDER — IBUPROFEN 600 MG/1
600 TABLET, FILM COATED ORAL EVERY 6 HOURS PRN
Status: DISCONTINUED | OUTPATIENT
Start: 2024-10-28 | End: 2024-10-28 | Stop reason: HOSPADM

## 2024-10-28 RX ORDER — PROTAMINE SULFATE 10 MG/ML
INJECTION, SOLUTION INTRAVENOUS PRN
Status: DISCONTINUED | OUTPATIENT
Start: 2024-10-28 | End: 2024-10-28

## 2024-10-28 RX ORDER — BENZOCAINE/MENTHOL 6 MG-10 MG
1 LOZENGE MUCOUS MEMBRANE 3 TIMES DAILY PRN
OUTPATIENT
Start: 2024-10-28 | End: 2024-10-30

## 2024-10-28 RX ORDER — FENTANYL CITRATE 50 UG/ML
25 INJECTION, SOLUTION INTRAMUSCULAR; INTRAVENOUS
Status: DISCONTINUED | OUTPATIENT
Start: 2024-10-28 | End: 2024-10-28 | Stop reason: HOSPADM

## 2024-10-28 RX ORDER — ONDANSETRON 2 MG/ML
INJECTION INTRAMUSCULAR; INTRAVENOUS PRN
Status: DISCONTINUED | OUTPATIENT
Start: 2024-10-28 | End: 2024-10-28

## 2024-10-28 RX ORDER — LIDOCAINE HYDROCHLORIDE 20 MG/ML
SOLUTION OROPHARYNGEAL
Status: COMPLETED | OUTPATIENT
Start: 2024-10-28 | End: 2024-10-28

## 2024-10-28 RX ORDER — SODIUM CHLORIDE 9 MG/ML
100 INJECTION, SOLUTION INTRAVENOUS CONTINUOUS
Status: DISCONTINUED | OUTPATIENT
Start: 2024-10-28 | End: 2024-10-28 | Stop reason: HOSPADM

## 2024-10-28 RX ORDER — HEPARIN SODIUM 10000 [USP'U]/100ML
INJECTION, SOLUTION INTRAVENOUS CONTINUOUS PRN
Status: DISCONTINUED | OUTPATIENT
Start: 2024-10-28 | End: 2024-10-28 | Stop reason: HOSPADM

## 2024-10-28 RX ORDER — FENTANYL CITRATE 50 UG/ML
INJECTION, SOLUTION INTRAMUSCULAR; INTRAVENOUS
Status: COMPLETED | OUTPATIENT
Start: 2024-10-28 | End: 2024-10-28

## 2024-10-28 RX ORDER — DEXAMETHASONE SODIUM PHOSPHATE 10 MG/ML
INJECTION, SOLUTION INTRAMUSCULAR; INTRAVENOUS PRN
Status: DISCONTINUED | OUTPATIENT
Start: 2024-10-28 | End: 2024-10-28

## 2024-10-28 RX ORDER — ACETAMINOPHEN 325 MG/1
650 TABLET ORAL EVERY 4 HOURS PRN
Status: DISCONTINUED | OUTPATIENT
Start: 2024-10-28 | End: 2024-10-28 | Stop reason: HOSPADM

## 2024-10-28 RX ORDER — PROPOFOL 10 MG/ML
INJECTION, EMULSION INTRAVENOUS PRN
Status: DISCONTINUED | OUTPATIENT
Start: 2024-10-28 | End: 2024-10-28

## 2024-10-28 RX ORDER — FENTANYL CITRATE 50 UG/ML
INJECTION, SOLUTION INTRAMUSCULAR; INTRAVENOUS PRN
Status: DISCONTINUED | OUTPATIENT
Start: 2024-10-28 | End: 2024-10-28

## 2024-10-28 RX ADMIN — FENTANYL CITRATE 25 MCG: 50 INJECTION INTRAMUSCULAR; INTRAVENOUS at 11:57

## 2024-10-28 RX ADMIN — LIDOCAINE HYDROCHLORIDE 15 ML: 20 SOLUTION ORAL; TOPICAL at 11:39

## 2024-10-28 RX ADMIN — FENTANYL CITRATE 25 MCG: 50 INJECTION INTRAMUSCULAR; INTRAVENOUS at 11:52

## 2024-10-28 RX ADMIN — MIDAZOLAM 1 MG: 1 INJECTION INTRAMUSCULAR; INTRAVENOUS at 11:51

## 2024-10-28 RX ADMIN — DEXAMETHASONE SODIUM PHOSPHATE 4 MG: 10 INJECTION, SOLUTION INTRAMUSCULAR; INTRAVENOUS at 12:45

## 2024-10-28 RX ADMIN — FENTANYL CITRATE 25 MCG: 50 INJECTION INTRAMUSCULAR; INTRAVENOUS at 11:45

## 2024-10-28 RX ADMIN — FENTANYL CITRATE 50 MCG: 50 INJECTION INTRAMUSCULAR; INTRAVENOUS at 12:44

## 2024-10-28 RX ADMIN — SODIUM CHLORIDE, POTASSIUM CHLORIDE, SODIUM LACTATE AND CALCIUM CHLORIDE: 600; 310; 30; 20 INJECTION, SOLUTION INTRAVENOUS at 12:22

## 2024-10-28 RX ADMIN — SUGAMMADEX 200 MG: 100 INJECTION, SOLUTION INTRAVENOUS at 13:33

## 2024-10-28 RX ADMIN — FENTANYL CITRATE 50 MCG: 50 INJECTION INTRAMUSCULAR; INTRAVENOUS at 13:03

## 2024-10-28 RX ADMIN — MIDAZOLAM 1 MG: 1 INJECTION INTRAMUSCULAR; INTRAVENOUS at 11:48

## 2024-10-28 RX ADMIN — PHENYLEPHRINE HYDROCHLORIDE 0.4 MCG/KG/MIN: 10 INJECTION INTRAVENOUS at 12:35

## 2024-10-28 RX ADMIN — ONDANSETRON 4 MG: 2 INJECTION INTRAMUSCULAR; INTRAVENOUS at 13:33

## 2024-10-28 RX ADMIN — PROTAMINE SULFATE 50 MG: 10 INJECTION, SOLUTION INTRAVENOUS at 13:33

## 2024-10-28 RX ADMIN — MIDAZOLAM 2 MG: 1 INJECTION INTRAMUSCULAR; INTRAVENOUS at 11:43

## 2024-10-28 RX ADMIN — Medication 100 MG: at 12:33

## 2024-10-28 RX ADMIN — ROCURONIUM BROMIDE 50 MG: 50 INJECTION, SOLUTION INTRAVENOUS at 13:02

## 2024-10-28 RX ADMIN — PHENYLEPHRINE HYDROCHLORIDE 100 MCG: 10 INJECTION INTRAVENOUS at 12:48

## 2024-10-28 RX ADMIN — LIDOCAINE HYDROCHLORIDE 5 ML: 10 INJECTION, SOLUTION INFILTRATION; PERINEURAL at 12:33

## 2024-10-28 RX ADMIN — PROPOFOL 200 MG: 10 INJECTION, EMULSION INTRAVENOUS at 12:33

## 2024-10-28 ASSESSMENT — ACTIVITIES OF DAILY LIVING (ADL)
ADLS_ACUITY_SCORE: 0

## 2024-10-28 ASSESSMENT — ENCOUNTER SYMPTOMS: DYSRHYTHMIAS: 1

## 2024-10-28 NOTE — SEDATION DOCUMENTATION
ELY complete. Patient tolerated well, VSS. NPO until 1300 or after ablation, and no hot foods/liquids until 1800.

## 2024-10-28 NOTE — Clinical Note
Arrhythmia Type: atrial fibrillation.   Method of Cardioversion: synchronous.   The arrhythmia was terminated.   Energy shock delivered: 300 joules.   Time shock delivered: 12:58 CDT.   Post cardioversion rhythm: sinus rhythm.

## 2024-10-28 NOTE — Clinical Note
[employer] system 12 lead EKG, hemodynamics 5 lead, pulse oximetery, NIBP, [employer] hands off defibrillator/external pacer, with 3 monitoring leads to patient. Baseline assessment done.

## 2024-10-28 NOTE — DISCHARGE INSTRUCTIONS
[facility]  Cardiac Electrophysiology  [address]  [place] [address]   Office: [phone]  Fax: [phone]       Cardiac Electrophysiology - Post Ablation Discharge Instructions      PROCEDURE   Atrial fibrillation ablation         MEDICATION INSTRUCTIONS   Continue taking your prior to procedure medications.  Continue taking your blood thinner rivaroxaban (Xarelto).          DISCHARGE INSTRUCTIONS   Medications   Take your medications as prescribed.   It is important for you to continue your blood thinner without interruption, unless your electrophysiologist instructs you otherwise.     General instructions   Have an adult stay with you until tomorrow.   You may resume your normal diet.     You may shower tomorrow.  Do not take a bath, or use a hot tub or pool for at least 1 week.    Activity recommendations   Do not drive for 3 days.   Avoid stooping or squatting more than 90 degrees at the hips for 7 days.   Avoid repetitive motions such as loading , vacuuming, raking or shoveling for 7 days.   Avoid heavy lifting (greater than 25lbs) for 7 days.       Groin care instructions   For the first 24 hours after your ablation, check the groin access sites every 1-2 hours while awake.   You may keep a bandaid over the puncture sites for 1 or 2 days post-procedure and thereafter may keep these sites uncovered.  Change the bandaid daily.  If there is minor oozing, apply another bandaid and remove it after 12 hours.   For 2 days, when you cough, sneeze, laugh or move your bowels, hold your hand over the puncture sites and press firmly.   Do not scrub the groin access sites.   Do not use lotion or powder near the groin access sites.       Arrhythmias following ablation  Recurrent atrial fibrillation and palpitations are common within the first 3 months post ablation while your heart recovers from the procedure.  These are usually more frequent in the first few weeks  following ablation and should occur less frequently over time.  Please contact us if you are having frequent or long lasting atrial fibrillation episodes following ablation.    Common findings after ablation  Bruising and a dime or pea size lump at the access sites is common.  If you notice increased swelling, external bleeding, or have other concerns regarding your groin access sites please call your electrophysiology team's office, and if after hours consider emergency department evaluation.   Soreness and mild pain at your groin sites is normal, to help relieve this pain you can apply ice/cold packs to the sites for 20min 3-4 times per day.   Pleuritic chest discomfort (chest pain worse with taking deep breaths, worse with laying flat on your back) can occur after ablation, usually coming about within the first 24-72hrs post ablation.  If this occurs and is severe enough to be troublesome to you, please call us and consider starting a course of ibuprofen 400mg three times daily for 5 to 7 days.     Things to watch for   As with any type of procedure, please be more attentive to unusual symptoms post ablation (eg. fever, neurologic changes, pain with swallowing, loss of consciousness, etc) - we recommend ER evaluation for any such symptoms in the first few weeks post procedure.       Contact the EP Nurse line with any of the following.  Contact the cardiology on-call number after business hours.    Consider ER evaluation for severe symptoms.   Groin pain, swelling, or growing hard lump around the puncture sites.   Groin redness, tenderness, swelling, or drainage (blood or pus).   Neurological changes (for example: leg, arm or face weakness or numbness, difficulties with speech or word finding, problems walking or with your balance, vision changes).   Any numbness, coolness or changes in color in your extremities.  Sudden onset severe abdominal or back pain.  Moderate or severe chest pain not relieved by Tylenol or  Ibuprofen, particularly after the first 48 hours.   Shortness of breath.   Chills or fever greater than 100 F.   Difficulty swallowing food or liquids.  Coughing up blood.   Blood in your stool.  Nausea and vomiting.  Difficulty urinating.  Recurrent atrial fibrillation associated with prolonged rapid heart rates (for example, heart rates over 140bpm for greater than 4 hours) or associated with additional concerning symptoms (for example, chest pain, lightheadedness, loss of consciousness, sweating).     If you are being evaluated at an emergency department, please tell your ER doctor that you have recently underwent an atrial fibrillation ablation and ask the ER to contact our office.  Many special considerations apply following ablation - these may be overlooked during an ER evaluation.      Our office will have a follow-up visit scheduled for you in approximately 6 weeks.  Please do not hesitate to call us before that time should issues arise.         [facility]      To reach the EP nurses working with Dr. Alicia:   [phone]        To reach the general [facility] line or after hours service:   [phone]   If you are calling after hours, please listen to the entire voicemail,    a live  will answer at the end of the message.              What is a Transesophageal Echocardiogram (ELY)?  This is a test that allows your doctor to record images of your heart from inside your esophagus, or food pipe. These images help your doctor identify or treat problems such as infection, disease, blood clots, or defects in your heart s walls or valves.     The test uses sound waves to see images of the heart muscle, valves, and function of the heart. A special instrument called a flexible transducer (ELY probe) is passed through your mouth into your esophagus. The transducer picks up heart sound waves and uses them to project a picture of the heart onto a screen. A recording is  made for the cardiologist to review.  The test itself takes approximately 15-30 minutes, but since medications are given to ensure your comfort, your visit will be longer. Allow 2-4 hours from your arrival to the time you can leave.    Preparation  To ensure an accurate and safe test, follow all instructions given by your doctor, including the following:  Do not eat or drink for six (6) hours prior to the test.   Unless otherwise instructed by your doctor, take your medications as you do normally with a small sip of water.  Bring a list of your current medications, including over-the-counter drugs, vitamins, and herbal supplements. List any allergies.  You must make arrangements for someone to drive you home. We recommend someone stay with you at home for up to 24 hours.    What happens during the test?   When you arrive for your ELY, you will change into a hospital gown, and you will be asked questions about medical history, medications, and allergies. An IV (intravenous) line will be started in your vein.  You will be given oxygen and your throat will be sprayed with a numbing medication. You may be given a mild sedative through your IV to help you relax. You will be asked to lie on your left side.  The doctor gently inserts the ELY probe into your mouth. As you swallow, the tube is slowly guided into your esophagus. The tube will be lubricated to make it slide easily.  You may feel the doctor moving the probe, but it shouldn t hurt or interfere with your breathing. A nurse monitors your heart rhythm, blood pressure, and breathing. The test usually takes 15-30 minutes.    When the test is complete  The medications you receive may make you drowsy. Do not drive for the remainder of the day of the test.  Because the back of your throat will be numb, you should not eat or drink until the numbness wears off (usually about 1 hour after the test). You will receive something to drink and eat 1 hour after the test. You may  not have any hot foods or liquids for 6 hours after the test.    At Home/After the Procedure  Rest the remainder of the day. Over activity may cause nausea and dizziness.  Do not drive or operate any machinery.  Follow your normal diet. Drink plenty of fluids.   Do not drink alcoholic beverages for 24 hours.  Use throat lozenges or ice chips if you experience a mild sore throat.  Call your primary doctor if you have questions, concerns, or experience any of the following:  Increased pain or difficulty when swallowing.    At Home/After the Procedure Continued  Stomach or abdominal pain.  Temperature above 99 degrees.    Test Results  The cardiologist will study and interpret the images. Your doctor s office will be given test results and contact you. If you do not hear from them within 3-5 business days, please contact your doctor directly.       Here at [facility], we are dedicated to providing the best possible care. We hope to make your experience as pleasant as possible. Thank you for taking the time to read this information. Feel free to ask questions if something is not clear to you.

## 2024-10-28 NOTE — INTERVAL H&P NOTE
I have reviewed the surgical (or preoperative) H&P that is linked to this encounter, and examined the patient. There are no significant changes    Clinical Conditions Present on Arrival:  Clinically Significant Risk Factors Present on Admission                 # Drug Induced Coagulation Defect: home medication list includes an anticoagulant medication       # Obesity: Estimated body mass index is 37.03 kg/m  as calculated from the following:    Height as of this encounter: 1.829 m (6').    Weight as of this encounter: 123.8 kg (273 lb).

## 2024-10-28 NOTE — ANESTHESIA CARE TRANSFER NOTE
Patient: Ravin Swanson    Procedure: Procedure(s):  Ablation Atrial Fibrillation       Diagnosis: atrial fibrillation  Diagnosis Additional Information: No value filed.    Anesthesia Type:   General     Note:    Oropharynx: oropharynx clear of all foreign objects  Level of Consciousness: drowsy  Oxygen Supplementation: face mask  Level of Supplemental Oxygen (L/min / FiO2): 6  Independent Airway: airway patency satisfactory and stable  Dentition: dentition unchanged  Vital Signs Stable: post-procedure vital signs reviewed and stable  Report to RN Given: handoff report given  Patient transferred to: Cardiac Special Care          Vitals:  Vitals Value Taken Time   /79    Temp 97.2    Pulse 80    Resp 16    SpO2 98        Electronically Signed By: DARLENE Solano CRNA  October 28, 2024  1:46 PM

## 2024-10-28 NOTE — ANESTHESIA POSTPROCEDURE EVALUATION
Patient: Ravin Swanson    Procedure: Procedure(s):  Ablation Atrial Fibrillation       Anesthesia Type:  General    Note:  Disposition: Outpatient   Postop Pain Control: Uneventful            Sign Out: Well controlled pain   PONV: No   Neuro/Psych: Uneventful            Sign Out: Acceptable/Baseline neuro status   Airway/Respiratory: Uneventful            Sign Out: Acceptable/Baseline resp. status   CV/Hemodynamics: Uneventful            Sign Out: Acceptable CV status; No obvious hypovolemia; No obvious fluid overload   Other NRE: NONE   DID A NON-ROUTINE EVENT OCCUR? No           Last vitals:  Vitals Value Taken Time   /78 10/28/24 1430   Temp 36.2  C (97.2  F) 10/28/24 1345   Pulse 76 10/28/24 1435   Resp 18 10/28/24 1345   SpO2 94 % 10/28/24 1435   Vitals shown include unfiled device data.    Electronically Signed By: Nyasia Joshi MD  October 28, 2024  4:58 PM

## 2024-10-28 NOTE — Clinical Note
Arrhythmia Type: atrial fibrillation.   Method of Cardioversion: synchronous.   The arrhythmia was terminated.   Energy shock delivered: 300 joules.   Time shock delivered: 13:20 CDT.

## 2024-10-28 NOTE — PLAN OF CARE
Pt doing well post procedure. Vitals stable. Right groin site CDI. Denies pain, continue to monitor.

## 2024-10-28 NOTE — INTERVAL H&P NOTE
I have reviewed the surgical (or preoperative) H&P that is linked to this encounter, and examined the patient. There are no significant changes    Clinical Conditions Present on Arrival:  Clinically Significant Risk Factors Present on Admission                 # Drug Induced Coagulation Defect: home medication list includes an anticoagulant medication       # Obesity: Estimated body mass index is 37.03 kg/m  as calculated from the following:    Height as of an earlier encounter on 10/28/24: 1.829 m (6').    Weight as of an earlier encounter on 10/28/24: 123.8 kg (273 lb).

## 2024-10-28 NOTE — ANESTHESIA PROCEDURE NOTES
Airway         Procedure Start/Stop Times: 10/28/2024 12:35 PM  Staff -        Anesthesiologist:  Nyasia Joshi MD       CRNA: Tuyet Santana APRN CRNA       Performed By: CRNAIndications and Patient Condition       Indications for airway management: airway protection       Induction type:intravenous       Mask difficulty assessment: 0 - not attempted    Final Airway Details       Final airway type: endotracheal airway       Successful airway: ETT - single  Endotracheal Airway Details        ETT size (mm): 8.0       Cuffed: yes       Successful intubation technique: direct laryngoscopy       DL Blade Type: Carrillo 2       Grade View of Cords: 2       Adjucts: stylet       Position: Right       Measured from: lips       Secured at (cm): 24       Bite block used: None    Post intubation assessment        Placement verified by: capnometry, equal breath sounds and chest rise        Number of attempts at approach: 1       Secured with: tape       Ease of procedure: easy       Dentition: Intact and Unchanged    Medication(s) Administered   Medication Administration Time: 10/28/2024 12:35 PM

## 2024-10-28 NOTE — PRE-PROCEDURE
ELY    GENERAL PRE-PROCEDURE:     Written consent obtained?: Yes    Risks and benefits: Risks, benefits and alternatives were discussed    Consent given by:  Patient  Patient states understanding of procedure being performed: Yes    Patient's understanding of procedure matches consent: Yes    Procedure consent matches procedure scheduled: Yes    Expected level of sedation:  Moderate  Appropriately NPO:  Yes  Mallampati  :  Grade 1- soft palate, uvula, tonsillar pillars, and posterior pharyngeal wall visible  Lungs:  Lungs clear with good breath sounds bilaterally  Heart:  Normal heart sounds and rate  History & Physical reviewed:  History and physical reviewed and no updates needed  Statement of review:  I have reviewed the lab findings, diagnostic data, medications, and the plan for sedation    Naty Cassidy MD  Noninvasive Cardiologist   [facility]

## 2024-10-28 NOTE — ANESTHESIA PREPROCEDURE EVALUATION
Anesthesia Pre-Procedure Evaluation    Patient: Ravin Swanson   MRN: [id number] : 1940        Procedure : Procedure(s):  Ablation Atrial Fibrillation          Past Medical History:   Diagnosis Date     Hypertension       Past Surgical History:   Procedure Laterality Date     COLONOSCOPY       COLONOSCOPY N/A 5/3/2024    Procedure: COLONOSCOPY with POLYPECTOMY;  Surgeon: Barry Hayes MD;  Location: [facility] Main OR     GENITOURINARY SURGERY        No Known Allergies   Social History     Tobacco Use     Smoking status: Former     Current packs/day: 0.00     Types: Cigarettes     Quit date:      Years since quittin.8     Smokeless tobacco: Never   Substance Use Topics     Alcohol use: Yes     Comment: minimal      Wt Readings from Last 1 Encounters:   10/28/24 123.8 kg (273 lb)        Anesthesia Evaluation            ROS/MED HX  ENT/Pulmonary:    Sleep apnea: denies.   Neurologic:  - neg neurologic ROS     Cardiovascular: Comment: Interpretation Summary     The left ventricle is normal in size.  There is moderate concentric left ventricular hypertrophy.  The visual ejection fraction is 40-45%.  There is mild-moderate global hypokinesia of the left ventricle.  The right ventricle is mildly dilated.  Moderately decreased right ventricular systolic function  The left atrium is severely dilated.  The right ventricular systolic pressure is approximated at 30mmHg plus the  right atrial pressure.  Ascending Aorta dilatation is present.  The rhythm was rapid atrial fibrillation.  There is no comparison study available.      (+)  hypertension- -   -  - -      CHF                  dysrhythmias, a-fib,             METS/Exercise Tolerance:     Hematologic:  - neg hematologic  ROS     Musculoskeletal:  - neg musculoskeletal ROS     GI/Hepatic:  - neg GI/hepatic ROS     Renal/Genitourinary:  - neg Renal ROS     Endo:     (+)               Obesity,       Psychiatric/Substance Use:  - neg psychiatric ROS   "   Infectious Disease:  - neg infectious disease ROS     Malignancy:  - neg malignancy ROS     Other:  - neg other ROS          Physical Exam    Airway  airway exam normal      Mallampati: II       Respiratory Devices and Support         Dental  no notable dental history         Cardiovascular   cardiovascular exam normal       Rhythm and rate: regular and normal     Pulmonary   pulmonary exam normal        breath sounds clear to auscultation       OUTSIDE LABS:  CBC:   Lab Results   Component Value Date    WBC 6.1 10/28/2024    WBC 6.5 10/23/2024    HGB 15.9 10/28/2024    HGB 15.5 10/23/2024    HCT 49.0 10/28/2024    HCT 46.3 10/23/2024     10/28/2024     10/23/2024     BMP:   Lab Results   Component Value Date     10/28/2024     10/23/2024    POTASSIUM 4.9 10/28/2024    POTASSIUM 4.5 10/23/2024    CHLORIDE 103 10/28/2024    CHLORIDE 100 10/23/2024    CO2 27 10/28/2024    CO2 27 10/23/2024    BUN 18.1 10/28/2024    BUN 21.5 10/23/2024    CR 1.24 (H) 10/28/2024    CR 1.16 10/23/2024     (H) 10/28/2024    GLC 88 10/23/2024     COAGS: No results found for: \"PTT\", \"INR\", \"FIBR\"  POC: No results found for: \"BGM\", \"HCG\", \"HCGS\"  HEPATIC:   Lab Results   Component Value Date    ALBUMIN 3.9 01/03/2024    PROTTOTAL 6.8 01/03/2024    ALT 11 01/03/2024    AST 15 01/03/2024    ALKPHOS 89 01/03/2024    BILITOTAL 1.0 01/03/2024     OTHER:   Lab Results   Component Value Date    CHANDA 8.8 10/28/2024    MAG 2.3 03/27/2024       Anesthesia Plan    ASA Status:  3       Anesthesia Type: General.     - Airway: ETT   Induction: Intravenous.   Maintenance: Balanced.        Consents    Anesthesia Plan(s) and associated risks, benefits, and realistic alternatives discussed. Questions answered and patient/representative(s) expressed understanding.     - Discussed:     - Discussed with:  Patient            Postoperative Care    Pain management: IV analgesics, Oral pain medications.   PONV prophylaxis: " Ondansetron (or other 5HT-3), Dexamethasone or Solumedrol     Comments:             Luke Mark MD    I have reviewed the pertinent notes and labs in the chart from the past 30 days and (re)examined the patient.  Any updates or changes from those notes are reflected in this note.            # Drug Induced Coagulation Defect: home medication list includes an anticoagulant medication    # Hypertension: Home medication list includes antihypertensive(s)  # Heart failure, NOS: heart failure noted on the problem list and last echo with EF 40-50%        # Obesity: Estimated body mass index is 37.03 kg/m  as calculated from the following:    Height as of this encounter: 1.829 m (6').    Weight as of this encounter: 123.8 kg (273 lb).

## 2024-10-28 NOTE — PROGRESS NOTES
Pt recovered well after procedure.  R Groin site remains CDI after ambulating to the bathroom before discharge.  CMS intact.  ELY and ablation discharge instructions reviewed with pt and family members.  Pt discharged home with family.

## 2024-10-31 ENCOUNTER — VIRTUAL VISIT (OUTPATIENT)
Dept: CARDIOLOGY | Facility: CLINIC | Age: 84
End: 2024-10-31
Payer: COMMERCIAL

## 2024-10-31 DIAGNOSIS — Z86.79 STATUS POST ABLATION OF ATRIAL FIBRILLATION: ICD-10-CM

## 2024-10-31 DIAGNOSIS — Z98.890 STATUS POST ABLATION OF ATRIAL FIBRILLATION: ICD-10-CM

## 2024-10-31 DIAGNOSIS — I48.19 PERSISTENT ATRIAL FIBRILLATION (H): Primary | ICD-10-CM

## 2024-10-31 PROCEDURE — 99207 PR NO CHARGE NURSE ONLY: CPT | Mod: 93

## 2024-10-31 NOTE — PROGRESS NOTES
Pt is s/p PVI PO day 3, today 10/31/2024 , PVI was completed on 10-28-24 with Dr. Alicia and pt was discharged the same day.  PC was placed to pt, spoke to pt    General Assessment:     Weight: Pt reports weight is slightly up from baseline compared to pre procedural weight, but confirms weight is coming down since discharge and denies s/s of abnormal fluid retention s/p procedure    Vital signs:  Pt reports normal heart rates and blood pressure and Pt has been afebrile.    Pain: Pt denies generalized or localized pain abnormal to healing s/p     /GI: Pt denies difficulty swallowing, denies heart burn, denies constipation, denies urinary retention/difficulty, reports normal appetite, and reports staying hydrated.    Respiratory: Pt denies SOB and denies any further symptoms abnormal to normal healing process s/p PVI.    Activity: Pt is tolerating advancement in activity while following physical restrictions.     Neurological:  Pt denies vision changes, changes in speech, changes in balance, and changes in strength or motor function.    Rhythm Assessment:   Pt denies palpitations and denies symptoms or sustained AF episodes.    Procedure Site Assessment:   Pt reports some bruising around sites without significant change from hospital discharge and normal to PVI recovery    Anticoagulation/Medication:  Pt remain on Xarelto without interruption  Pt  confirm    Education completed with pt at this visit:  Reviewed normal post-op PVI healing process, when to contact EP-RN/EP-MD, contact information was given to the pt for further concerns or questions, after hours contact was reviewed with patient, and pt verbalized understanding    Follow up:  AVS mailed to patient and is available through my chart.  Pt Does have a 6 week follow up scheduled with Holly Fabian on 12-9-24 .    10/31/2024 11:07 AM  Sommer Fang RN

## 2024-10-31 NOTE — PATIENT INSTRUCTIONS
Instructions Following your Ablation Procedure    Your anticoagulation medication Xarelto:  It is important to remain on your anticoagulation medication uninterrupted after your ablation to reduce your risk of a stroke or heart attack, do not stop this medication  Please contact me if you have any questions regarding your anticoagulation medication    Groin care instructions  Keep the site clean and dry, do not place a bandage over the site. If there is minor oozing, apply another bandaid and remove it after 12 hours.  Mild bruising at the access sites is normal. If you notice increased swelling, external bleeding, or have other concerns regarding your access sites please consider emergency department evaluation for significant changes and call your electrophysiology team's office.  You may experience mild discomfort at your groin sites, applying ice packs 20min 3-4 times a day can help alleviate this discomfort.    Activity recommendations  You can resume driving.  Avoid stooping or squatting more than 90 degrees at the hips, repetitive motions such as loading , vacuuming, raking or shoveling, and heavy lifting (greater than 25lbs) for 1 week  Increase your activity gradually over the next 5-10 days, working back to your normal daily activity/routine.    Post ablation instructions  Stay well hydrated, and increase your fluid intake during this recovery period  High protein foods aide in your bodies healing process  You may have some irregular heartbeats and/or atrial fibrillation following your ablation which is normal to recovery, these episodes should occur less frequently over time.   Recurrent atrial fibrillation can occur within the first 3 months post ablation while your heart recovers from the procedure. Please call the electrophysiology team's office if you have an episode lasting greater than 4 hours, or if you notice the episodes are increasing in frequency or duration  Pleuritic chest  discomfort (chest pain worse with taking deep breaths, worse with laying flat on your back) can occur after ablation, usually coming about within the first 24-48hrs post ablation. If this occurs and is severe enough to be troublesome to you, please call us and consider starting a course of ibuprofen 400mg three times daily for 5 to 7 days    Things to watch for  As with any type of procedure, please be more attentive to unusual symptoms post ablation (eg. fever, neurologic changes, pain with swallowing, loss of consciousness, etc) - we recommend ER evaluation for any such symptoms in the first few weeks post procedure.    Consider ER evaluation for the following:  Severe chest pain not relieved by Tylenol or Ibuprofen  You have chills or a fever greater than 101 F (38 C)  Neurologic changes (eg. leg, arm or face weakness or numbness, difficulties with speech or word finding, problems  walking or with your balance, vision changes)  Severe difficulty swallowing and/or you are coughing up blood  Shortness of breath  Increased groin pain or a large or growing hard lump around the site  Groin is red, swollen, hot or tender  Blood or fluid is draining from the groin site  Any numbness, coolness or changes in color in your extremities  Groin pain not relieved by Tylenol or Advil  Recurrent atrial fibrillation associated with sustained rapid heart rates or associated with additional concerning  symptoms.    Your follow up appointments are as follows:  You will be seen by the electrophysiologist nurse practitioner at 6 weeks after your ablation  At your 6 week appointment, a 3 month follow-up appointment will be arranged with either the Nurse Practitioner or the Electrophysiology provider     Sincerely,  Sommer Fang RN [phone]    After hours please contact the on call service at # [phone]

## 2024-12-09 ENCOUNTER — OFFICE VISIT (OUTPATIENT)
Dept: CARDIOLOGY | Facility: CLINIC | Age: 84
End: 2024-12-09
Payer: COMMERCIAL

## 2024-12-09 VITALS
RESPIRATION RATE: 12 BRPM | SYSTOLIC BLOOD PRESSURE: 80 MMHG | DIASTOLIC BLOOD PRESSURE: 60 MMHG | HEART RATE: 60 BPM | HEIGHT: 72 IN | WEIGHT: 267.5 LBS | BODY MASS INDEX: 36.23 KG/M2

## 2024-12-09 DIAGNOSIS — Z98.890 STATUS POST ABLATION OF ATRIAL FIBRILLATION: ICD-10-CM

## 2024-12-09 DIAGNOSIS — I48.19 PERSISTENT ATRIAL FIBRILLATION (H): Primary | ICD-10-CM

## 2024-12-09 DIAGNOSIS — I50.20 HFREF (HEART FAILURE WITH REDUCED EJECTION FRACTION) (H): ICD-10-CM

## 2024-12-09 DIAGNOSIS — R29.818 SUSPECTED SLEEP APNEA: ICD-10-CM

## 2024-12-09 DIAGNOSIS — Z86.79 STATUS POST ABLATION OF ATRIAL FIBRILLATION: ICD-10-CM

## 2024-12-09 PROCEDURE — 99214 OFFICE O/P EST MOD 30 MIN: CPT | Performed by: NURSE PRACTITIONER

## 2024-12-09 PROCEDURE — G2211 COMPLEX E/M VISIT ADD ON: HCPCS | Performed by: NURSE PRACTITIONER

## 2024-12-09 RX ORDER — METOPROLOL SUCCINATE 50 MG/1
25 TABLET, EXTENDED RELEASE ORAL DAILY
Status: SHIPPED
Start: 2024-12-09

## 2024-12-09 NOTE — PROGRESS NOTES
Thank you, Dr. Alicia, for asking the [facility] team to see . Ravin WICK Sky to evaluate 6 weeks post ablation.    Assessment/Recommendations     Assessment/Plan:    Diagnoses and all orders for this visit:  Persistent atrial fibrillation (H)  Status post ablation of atrial fibrillation  - symptomatic with dyspnea, associated with systolic heart failure, a component of which may be tachycardia-mediated   --s/p PVI ablation 10/28/24 Dr Alicia, no recurrence of atrial fibrillation post ablation, he has been dealing with some ongoing intermittent positional lightheadedness and near syncope since ablation, mild orthostatic hypotension noted today in clinic.  No ER visits since ablation      [id number] score of 3 due to age > 75, HF and on Xarelto. No missed doses x3 weeks, taking with full meal at dinnertime.  X 3 episodes of hematochezia noted since ablation.  He did undergo colonoscopy in May of this year which showed a 7 mm polyp along with diverticulitis in the sigmoid colon along with internal hemorrhoids.  No further follow-up or repeat colonoscopy was recommended due to his age.     --reduce metoprolol XL to 25 mg daily (HFrEF/GDMT) due to orthostatic hypotension and ongoing lightheadedness/near syncope  --Continue Xarelto daily, take with full meal  --Follow up with me 3 months post ablation (1/28/25)    HFrEF (heart failure with reduced ejection fraction) (H)  --LVEF 42.7% with small endomyocardial mid-distal inferolateral LV by 8/20/2024 cardiac MRI. Cannot exclude a component of tachycardia-mediated cardiomyopathy   --GDMT: Lasix, metoprolol, Lisinopril, Spironolactone  --reassess LV function after 2-3 months in sinus rhythm     Suspected WALTER  --patient canceled sleep study.  We discussed correlation between untreated sleep apnea and atrial fibrillation.  Recommend rescheduling and/or further follow-up at later date          History of Present Illness/Subjective     Ravin Swanson  is a very pleasant 84 year old male who comes in today for EP follow up 6 weeks post ablation    Ravin Swanson has a known history of persistent atrial fibrillation with severe bi-atrial dilation and fibrosis by 8/20/2024 cardiac MRI, systolic heart failure (LVEF 42.7% with small endomyocardial mid-distal inferolateral LV by 8/20/2024 cardiac MRI), HTN, obesity, WALTER .    Mr. Swanson's atrial fibrillation history is as summarized below:  Symptoms: dyspnea  Symptom onset date: around time of COVID infection 11/2023  Diagnosis date: 1/8/2024 by TTE (AF/RVR), LVEF 40-45%  Admissions/ER visits: none  Prior medical therapies: no prior AADs  Prior DCCVs: none  Prior ablations: none  Percutaneous left atrial appendage occlusion: none     He notes dyspnea with varying degrees of physical activity.  He exercises at the gym on most days.  He denies chest pain, syncope.    Bill is maintaining normal sinus rhythm 6 weeks post ablation with controlled ventricular rates in the 60s on his current dosing of metoprolol XL 50 mg daily.  He does not perform any interval ECG monitoring at home by way of [employer] mobile device or smart watch.  He has been dealing with ongoing lightheadedness and 2 episodes of near syncope since ablation.  He notes this mainly on the days when he is exercising at the gym and when he bends over.  His last episode occurred roughly 3 weeks ago when he was sitting on the bench at the gym and bent over to  his towel and felt that he was going to pass out.  Blood pressure is borderline hypotensive today.  Blood pressure assessed in the standing position today and noted to be 80/60 which is a 15 point drop when compared to sitting position.  He denies any recent chest pain or palpitations, shortness of breath on exertion or at rest, he notes improved stamina since his ablation.  He is mainly just concerned with the ongoing lightheadedness and 2 episodes of near syncope when he was at the gym.  Weight is  down 6 pounds since ablation.  He does weigh himself on a daily basis due to his heart failure with reduced ejection fraction, he also monitors some sodium intake.  GDMT includes furosemide, metoprolol, lisinopril and spironolactone.  He was recently evaluated by the heart failure clinic, no medication changes were made at that appointment.  Denies any complications with groin site access since ablation.  No ER visits for any acute complications following his procedure.  Continues to take Xarelto daily for stroke prevention, he is taking this with a full meal daily.  He has not missed any doses over the last 3 weeks.  He does note 3 episodes of hematochezia since his ablation.  He did undergo a colonoscopy in May of this year which showed a 7 mm polyp and some diverticulosis along with internal hemorrhoids.  No further follow-up was recommended due to his age.           Cardiographics (reviewed):  ECGs (tracings independently reviewed)  10/28/24 NSR incomplete RBBB 79 bpm  ms QT/QTC: 422/483 ms (post ablation)  8/20/2024 - AF, 83bpm, inferior Qw  3/6/2024 - AF, 107bpm  2/10/2015 - SR 63bpm     3/7/2024 Holter monitoring (duration 24hrs) (independently reviewed)  Continuous atrial fibrillation, 75 to 136bpm, average 100bpm.  There were no pauses of greater than 3 seconds.  Rare premature ventricular contractions (<1%).  Symptom triggers - none.     8/20/2024 cardiac MRI  1.  Pharmacological Regadenoson stress cardiac MRI is negative for inducible myocardial ischemia.   2.  Pharmacological stress ECG is negative for inducible myocardial ischemia. Atrial fib with controlled  heart rated noted during stress testing.    3. Normal left ventricular size and moderately reduced global systolic function. The quantified left  ventricular ejection fraction is 42.7%.  Small area of endomyocardial fibrosis involving the mid to distal  inferolateral wall segments.  No consistent with non-coronary pattern such as prior  myocarditis.      Pre-contrast T1: 993 ms (normal).  4.  Mildly enlarged right ventricular size with moderate to severe reduction in right ventricular systolic  function.  RVEF: 31.8%.    5.  No significant valvular abnormalities.  6.  Severe right and left atrial enlargement.  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   7.  Mild to moderate ascending aortic enlargement, 43x44 mm (non-contrast enhanced imaging).       1/8/2024 TTE  The left ventricle is normal in size.  There is moderate concentric left ventricular hypertrophy.  The visual ejection fraction is 40-45%.  There is mild-moderate global hypokinesia of the left ventricle.  The right ventricle is mildly dilated.  Moderately decreased right ventricular systolic function  The left atrium is severely dilated.  The right ventricular systolic pressure is approximated at 30mmHg plus the  right atrial pressure.  Ascending Aorta dilatation is present.  The rhythm was rapid atrial fibrillation.  There is no comparison study available.                Problem List:  Patient Active Problem List   Diagnosis    Herpes Zoster (Shingles)    Morbid obesity (H)    Tachycardia induced cardiomyopathy (H)    Acute systolic congestive heart failure (H)    Persistent atrial fibrillation (H) with RVR    Possible WALTER (obstructive sleep apnea)    Primary malignant neoplasm of prostate (H)    Male urinary stress incontinence    History of radiation therapy    Disappearance and death of family member    Abnormal prostate specific antigen (PSA)     Revi  e  Physical Examination Review of Systems   [facility]  There were no vitals taken for this visit.  There is no height or weight on file to calculate BMI.  Wt Readings from Last 3 Encounters:   10/28/24 123.8 kg (273 lb)   10/23/24 123.9 kg (273 lb 1.6 oz)   10/02/24 122.9 kg (271 lb)     General Appearance:   Alert, well-appearing and in no acute distress.   HEENT: Atraumatic, normocephalic.  No scleral icterus, normal conjunctivae;  mucous membranes pink and moist.     Chest: Chest symmetric, spine straight.   Lungs:   Respirations unlabored: Lungs are clear to auscultation.   Cardiovascular:   Normal first and second heart sounds with no murmurs, rubs, or gallops.  Regular, regular.   Normal JVD, no edema.       Extremities: No cyanosis or clubbing   Musculoskeletal: Moves all extremities   Skin: Warm, dry, intact.    Neurologic: Mood and affect are appropriate, alert and oriented to person, place, time, and situation     ROS: 10 point ROS neg other than the symptoms noted above in the HPI.     Medical History  Surgical History Family History Social History     Past Medical History:   Diagnosis Date    Hypertension     Past Surgical History:   Procedure Laterality Date    COLONOSCOPY      COLONOSCOPY N/A 5/3/2024    Procedure: COLONOSCOPY with POLYPECTOMY;  Surgeon: Barry Hayes MD;  Location: [facility] Main OR    EP ABLATION PULMONARY VEIN ISOLATION N/A 10/28/2024    Procedure: Ablation Atrial Fibrillation;  Surgeon: Isak Alicia MD;  Location: [facility] CV    GENITOURINARY SURGERY      No family history on file. History   Smoking Status    Former    Types: Cigarettes   Smokeless Tobacco    Never     Social History    Substance and Sexual Activity      Alcohol use: Yes        Comment: minimal       Medications  Allergies     Current Outpatient Medications   Medication Sig Dispense Refill    furosemide (LASIX) 20 MG tablet Take 2 tablets (40 mg) by mouth daily 180 tablet 1    lisinopril (ZESTRIL) 5 MG tablet Take 1 tablet (5 mg) by mouth daily 90 tablet 3    metoprolol succinate ER (TOPROL XL) 50 MG 24 hr tablet Take 1 tablet (50 mg) by mouth daily 90 tablet 3    pantoprazole (PROTONIX) 40 MG EC tablet Take 1 tablet (40 mg) by mouth daily. 45 tablet 0    rivaroxaban ANTICOAGULANT (XARELTO) 20 MG TABS tablet Take 1 tablet (20 mg) by mouth daily (with dinner) 30 tablet 11    spironolactone (ALDACTONE) 25 MG tablet  "Take 1 tablet (25 mg) by mouth daily 90 tablet 3      No Known Allergies   Medical, surgical, family, social history, and medications were all reviewed and updated as necessary.   Lab Results    Chemistry/lipid CBC Cardiac Enzymes/BNP/TSH/INR   Recent Labs   Lab Test 01/03/24  0810   CHOL 124   HDL 44   LDL 67   TRIG 66     Recent Labs   Lab Test 01/03/24  0810 05/25/21  1116   LDL 67 77     Recent Labs   Lab Test 10/28/24  1032      POTASSIUM 4.9   CHLORIDE 103   CO2 27   *   BUN 18.1   CR 1.24*   GFRESTIMATED 57*   CHANDA 8.8     Recent Labs   Lab Test 10/28/24  1032 10/23/24  1131 08/13/24  1120   CR 1.24* 1.16 1.15     No results for input(s): \"A1C\" in the last 18351 hours.       Recent Labs   Lab Test 10/28/24  1032   WBC 6.1   HGB 15.9   HCT 49.0   MCV 95        Recent Labs   Lab Test 10/28/24  1032 10/23/24  1131   HGB 15.9 15.5    No results for input(s): \"TROPONINI\" in the last 80805 hours.  Recent Labs   Lab Test 03/27/24  1146   NTBNP 742     No results for input(s): \"TSH\" in the last 09383 hours.  No results for input(s): \"INR\" in the last 03702 hours.       Total Time- 28 minutes spent on date of encounter doing chart review, history and exam, documentation and further activities as noted above.  This note has been dictated using voice recognition software. Any grammatical, typographical, or context distortions are unintentional and inherent to the software.    Holly Fabian RUST  [phone]                       "

## 2024-12-09 NOTE — PATIENT INSTRUCTIONS
Ravin Swanson,    It was a pleasure to see you today at the [facility].     My recommendations after this visit include:    --reduce metoprolol XL to 25 mg daily (HFrEF/GDMT) due to orthostatic hypotension and ongoing lightheadedness  --Continue Xarelto daily, take with full meal  --recommend interval EKG monitoring by [employer] device   --Follow up with me 3 months post ablation (1/28/25)    Holly Fabian CNP  [facility], Electrophysiology  [phone]  EP nurses [phone]

## 2024-12-09 NOTE — LETTER
12/9/2024    Ravin Dockery MD  [facility] [address] [place] [address]    RE: Ravin Swanson       Dear Colleague,     I had the pleasure of seeing Ravin Swanson in the [facility].    Thank you, Dr. Alicia, for asking the [facility] team to see . Ravin Swanson to evaluate 6 weeks post ablation.    Assessment/Recommendations     Assessment/Plan:    Diagnoses and all orders for this visit:  Persistent atrial fibrillation (H)  Status post ablation of atrial fibrillation  - symptomatic with dyspnea, associated with systolic heart failure, a component of which may be tachycardia-mediated   --s/p PVI ablation 10/28/24 Dr Alicia, no recurrence of atrial fibrillation post ablation, he has been dealing with some ongoing intermittent positional lightheadedness and near syncope since ablation, mild orthostatic hypotension noted today in clinic.  No ER visits since ablation      [id number] score of 3 due to age > 75, HF and on Xarelto. No missed doses x3 weeks, taking with full meal at dinnertime.  X 3 episodes of hematochezia noted since ablation.  He did undergo colonoscopy in May of this year which showed a 7 mm polyp along with diverticulitis in the sigmoid colon along with internal hemorrhoids.  No further follow-up or repeat colonoscopy was recommended due to his age.     --reduce metoprolol XL to 25 mg daily (HFrEF/GDMT) due to orthostatic hypotension and ongoing lightheadedness/near syncope  --Continue Xarelto daily, take with full meal  --Follow up with me 3 months post ablation (1/28/25)    HFrEF (heart failure with reduced ejection fraction) (H)  --LVEF 42.7% with small endomyocardial mid-distal inferolateral LV by 8/20/2024 cardiac MRI. Cannot exclude a component of tachycardia-mediated cardiomyopathy   --GDMT: Lasix, metoprolol, Lisinopril, Spironolactone  --reassess LV function after 2-3 months in sinus rhythm     Suspected WALTER  --patient  canceled sleep study.  We discussed correlation between untreated sleep apnea and atrial fibrillation.  Recommend rescheduling and/or further follow-up at later date          History of Present Illness/Subjective     Ravin Swanson is a very pleasant 84 year old male who comes in today for EP follow up 6 weeks post ablation    Ravin Swanson has a known history of persistent atrial fibrillation with severe bi-atrial dilation and fibrosis by 8/20/2024 cardiac MRI, systolic heart failure (LVEF 42.7% with small endomyocardial mid-distal inferolateral LV by 8/20/2024 cardiac MRI), HTN, obesity, WALTER .    Mr. Swanson's atrial fibrillation history is as summarized below:  Symptoms: dyspnea  Symptom onset date: around time of COVID infection 11/2023  Diagnosis date: 1/8/2024 by TTE (AF/RVR), LVEF 40-45%  Admissions/ER visits: none  Prior medical therapies: no prior AADs  Prior DCCVs: none  Prior ablations: none  Percutaneous left atrial appendage occlusion: none     He notes dyspnea with varying degrees of physical activity.  He exercises at the gym on most days.  He denies chest pain, syncope.    Alhaji is maintaining normal sinus rhythm 6 weeks post ablation with controlled ventricular rates in the 60s on his current dosing of metoprolol XL 50 mg daily.  He does not perform any interval ECG monitoring at home by way of [employer] mobile device or smart watch.  He has been dealing with ongoing lightheadedness and 2 episodes of near syncope since ablation.  He notes this mainly on the days when he is exercising at the gym and when he bends over.  His last episode occurred roughly 3 weeks ago when he was sitting on the bench at the gym and bent over to  his towel and felt that he was going to pass out.  Blood pressure is borderline hypotensive today.  Blood pressure assessed in the standing position today and noted to be 80/60 which is a 15 point drop when compared to sitting position.  He denies any recent chest pain or  palpitations, shortness of breath on exertion or at rest, he notes improved stamina since his ablation.  He is mainly just concerned with the ongoing lightheadedness and 2 episodes of near syncope when he was at the gym.  Weight is down 6 pounds since ablation.  He does weigh himself on a daily basis due to his heart failure with reduced ejection fraction, he also monitors some sodium intake.  GDMT includes furosemide, metoprolol, lisinopril and spironolactone.  He was recently evaluated by the heart failure clinic, no medication changes were made at that appointment.  Denies any complications with groin site access since ablation.  No ER visits for any acute complications following his procedure.  Continues to take Xarelto daily for stroke prevention, he is taking this with a full meal daily.  He has not missed any doses over the last 3 weeks.  He does note 3 episodes of hematochezia since his ablation.  He did undergo a colonoscopy in May of this year which showed a 7 mm polyp and some diverticulosis along with internal hemorrhoids.  No further follow-up was recommended due to his age.           Cardiographics (reviewed):  ECGs (tracings independently reviewed)  10/28/24 NSR incomplete RBBB 79 bpm  ms QT/QTC: 422/483 ms (post ablation)  8/20/2024 - AF, 83bpm, inferior Qw  3/6/2024 - AF, 107bpm  2/10/2015 - SR 63bpm     3/7/2024 Holter monitoring (duration 24hrs) (independently reviewed)  Continuous atrial fibrillation, 75 to 136bpm, average 100bpm.  There were no pauses of greater than 3 seconds.  Rare premature ventricular contractions (<1%).  Symptom triggers - none.     8/20/2024 cardiac MRI  1.  Pharmacological Regadenoson stress cardiac MRI is negative for inducible myocardial ischemia.   2.  Pharmacological stress ECG is negative for inducible myocardial ischemia. Atrial fib with controlled  heart rated noted during stress testing.    3. Normal left ventricular size and moderately reduced global  systolic function. The quantified left  ventricular ejection fraction is 42.7%.  Small area of endomyocardial fibrosis involving the mid to distal  inferolateral wall segments.  No consistent with non-coronary pattern such as prior myocarditis.      Pre-contrast T1: 993 ms (normal).  4.  Mildly enlarged right ventricular size with moderate to severe reduction in right ventricular systolic  function.  RVEF: 31.8%.    5.  No significant valvular abnormalities.  6.  Severe right and left atrial enlargement.  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   7.  Mild to moderate ascending aortic enlargement, 43x44 mm (non-contrast enhanced imaging).       1/8/2024 TTE  The left ventricle is normal in size.  There is moderate concentric left ventricular hypertrophy.  The visual ejection fraction is 40-45%.  There is mild-moderate global hypokinesia of the left ventricle.  The right ventricle is mildly dilated.  Moderately decreased right ventricular systolic function  The left atrium is severely dilated.  The right ventricular systolic pressure is approximated at 30mmHg plus the  right atrial pressure.  Ascending Aorta dilatation is present.  The rhythm was rapid atrial fibrillation.  There is no comparison study available.                Problem List:  Patient Active Problem List   Diagnosis     Herpes Zoster (Shingles)     Morbid obesity (H)     Tachycardia induced cardiomyopathy (H)     Acute systolic congestive heart failure (H)     Persistent atrial fibrillation (H) with RVR     Possible WALTER (obstructive sleep apnea)     Primary malignant neoplasm of prostate (H)     Male urinary stress incontinence     History of radiation therapy     Disappearance and death of family member     Abnormal prostate specific antigen (PSA)     Revi  e  Physical Examination Review of Systems   [facility]  There were no vitals taken for this visit.  There is no height or weight on file to calculate BMI.  Wt Readings from Last 3 Encounters:    10/28/24 123.8 kg (273 lb)   10/23/24 123.9 kg (273 lb 1.6 oz)   10/02/24 122.9 kg (271 lb)     General Appearance:   Alert, well-appearing and in no acute distress.   HEENT: Atraumatic, normocephalic.  No scleral icterus, normal conjunctivae; mucous membranes pink and moist.     Chest: Chest symmetric, spine straight.   Lungs:   Respirations unlabored: Lungs are clear to auscultation.   Cardiovascular:   Normal first and second heart sounds with no murmurs, rubs, or gallops.  Regular, regular.   Normal JVD, no edema.       Extremities: No cyanosis or clubbing   Musculoskeletal: Moves all extremities   Skin: Warm, dry, intact.    Neurologic: Mood and affect are appropriate, alert and oriented to person, place, time, and situation     ROS: 10 point ROS neg other than the symptoms noted above in the HPI.     Medical History  Surgical History Family History Social History     Past Medical History:   Diagnosis Date     Hypertension     Past Surgical History:   Procedure Laterality Date     COLONOSCOPY       COLONOSCOPY N/A 5/3/2024    Procedure: COLONOSCOPY with POLYPECTOMY;  Surgeon: Barry Hayes MD;  Location: [facility] Main OR     EP ABLATION PULMONARY VEIN ISOLATION N/A 10/28/2024    Procedure: Ablation Atrial Fibrillation;  Surgeon: Isak Alicia MD;  Location: [facility] CV     GENITOURINARY SURGERY      No family history on file. History   Smoking Status     Former     Types: Cigarettes   Smokeless Tobacco     Never     Social History    Substance and Sexual Activity      Alcohol use: Yes        Comment: minimal       Medications  Allergies     Current Outpatient Medications   Medication Sig Dispense Refill     furosemide (LASIX) 20 MG tablet Take 2 tablets (40 mg) by mouth daily 180 tablet 1     lisinopril (ZESTRIL) 5 MG tablet Take 1 tablet (5 mg) by mouth daily 90 tablet 3     metoprolol succinate ER (TOPROL XL) 50 MG 24 hr tablet Take 1 tablet (50 mg) by mouth daily 90 tablet 3  "    pantoprazole (PROTONIX) 40 MG EC tablet Take 1 tablet (40 mg) by mouth daily. 45 tablet 0     rivaroxaban ANTICOAGULANT (XARELTO) 20 MG TABS tablet Take 1 tablet (20 mg) by mouth daily (with dinner) 30 tablet 11     spironolactone (ALDACTONE) 25 MG tablet Take 1 tablet (25 mg) by mouth daily 90 tablet 3      No Known Allergies   Medical, surgical, family, social history, and medications were all reviewed and updated as necessary.   Lab Results    Chemistry/lipid CBC Cardiac Enzymes/BNP/TSH/INR   Recent Labs   Lab Test 01/03/24  0810   CHOL 124   HDL 44   LDL 67   TRIG 66     Recent Labs   Lab Test 01/03/24  0810 05/25/21  1116   LDL 67 77     Recent Labs   Lab Test 10/28/24  1032      POTASSIUM 4.9   CHLORIDE 103   CO2 27   *   BUN 18.1   CR 1.24*   GFRESTIMATED 57*   CHANDA 8.8     Recent Labs   Lab Test 10/28/24  1032 10/23/24  1131 08/13/24  1120   CR 1.24* 1.16 1.15     No results for input(s): \"A1C\" in the last 78557 hours.       Recent Labs   Lab Test 10/28/24  1032   WBC 6.1   HGB 15.9   HCT 49.0   MCV 95        Recent Labs   Lab Test 10/28/24  1032 10/23/24  1131   HGB 15.9 15.5    No results for input(s): \"TROPONINI\" in the last 96482 hours.  Recent Labs   Lab Test 03/27/24  1146   NTBNP 742     No results for input(s): \"TSH\" in the last 54270 hours.  No results for input(s): \"INR\" in the last 50243 hours.       Total Time- 28 minutes spent on date of encounter doing chart review, history and exam, documentation and further activities as noted above.  This note has been dictated using voice recognition software. Any grammatical, typographical, or context distortions are unintentional and inherent to the software.    Holly Fabian [facility]  [phone]                         Thank you for allowing me to participate in the care of your patient.      Sincerely,     Holly Fabian NP     [facility]  cc: "   Isak Alicia MD  [address]  [place] [address]

## 2025-01-03 ENCOUNTER — TRANSFERRED RECORDS (OUTPATIENT)
Dept: HEALTH INFORMATION MANAGEMENT | Facility: CLINIC | Age: 85
End: 2025-01-03

## 2025-01-03 ENCOUNTER — LAB REQUISITION (OUTPATIENT)
Dept: LAB | Facility: CLINIC | Age: 85
End: 2025-01-03
Payer: COMMERCIAL

## 2025-01-03 DIAGNOSIS — Z00.00 ENCOUNTER FOR GENERAL ADULT MEDICAL EXAMINATION WITHOUT ABNORMAL FINDINGS: ICD-10-CM

## 2025-01-03 DIAGNOSIS — E66.01 MORBID (SEVERE) OBESITY DUE TO EXCESS CALORIES (H): ICD-10-CM

## 2025-01-03 LAB
ALBUMIN SERPL BCG-MCNC: 3.9 G/DL (ref 3.5–5.2)
ALP SERPL-CCNC: 111 U/L (ref 40–150)
ALT SERPL W P-5'-P-CCNC: 21 U/L (ref 0–70)
ANION GAP SERPL CALCULATED.3IONS-SCNC: 13 MMOL/L (ref 7–15)
AST SERPL W P-5'-P-CCNC: 21 U/L (ref 0–45)
BILIRUB SERPL-MCNC: 0.8 MG/DL
BUN SERPL-MCNC: 17.2 MG/DL (ref 8–23)
CALCIUM SERPL-MCNC: 9.2 MG/DL (ref 8.8–10.4)
CHLORIDE SERPL-SCNC: 101 MMOL/L (ref 98–107)
CHOLEST SERPL-MCNC: 152 MG/DL
CREAT SERPL-MCNC: 1.12 MG/DL (ref 0.67–1.17)
EGFRCR SERPLBLD CKD-EPI 2021: 65 ML/MIN/1.73M2
FASTING STATUS PATIENT QL REPORTED: YES
FASTING STATUS PATIENT QL REPORTED: YES
GLUCOSE SERPL-MCNC: 95 MG/DL (ref 70–99)
HCO3 SERPL-SCNC: 24 MMOL/L (ref 22–29)
HDLC SERPL-MCNC: 40 MG/DL
LDLC SERPL CALC-MCNC: 85 MG/DL
NONHDLC SERPL-MCNC: 112 MG/DL
POTASSIUM SERPL-SCNC: 4.3 MMOL/L (ref 3.4–5.3)
PROT SERPL-MCNC: 7.2 G/DL (ref 6.4–8.3)
SODIUM SERPL-SCNC: 138 MMOL/L (ref 135–145)
TRIGL SERPL-MCNC: 133 MG/DL
TSH SERPL DL<=0.005 MIU/L-ACNC: 1.49 UIU/ML (ref 0.3–4.2)

## 2025-01-03 PROCEDURE — 80061 LIPID PANEL: CPT | Mod: ORL | Performed by: FAMILY MEDICINE

## 2025-01-03 PROCEDURE — 80053 COMPREHEN METABOLIC PANEL: CPT | Mod: ORL | Performed by: FAMILY MEDICINE

## 2025-01-03 PROCEDURE — 84443 ASSAY THYROID STIM HORMONE: CPT | Mod: ORL | Performed by: FAMILY MEDICINE

## 2025-01-08 ENCOUNTER — TELEPHONE (OUTPATIENT)
Dept: CARDIOLOGY | Facility: CLINIC | Age: 85
End: 2025-01-08
Payer: COMMERCIAL

## 2025-01-08 DIAGNOSIS — I48.19 PERSISTENT ATRIAL FIBRILLATION (H): ICD-10-CM

## 2025-01-08 DIAGNOSIS — Z98.890 STATUS POST ABLATION OF ATRIAL FIBRILLATION: ICD-10-CM

## 2025-01-08 DIAGNOSIS — I50.20 HFREF (HEART FAILURE WITH REDUCED EJECTION FRACTION) (H): ICD-10-CM

## 2025-01-08 DIAGNOSIS — Z86.79 STATUS POST ABLATION OF ATRIAL FIBRILLATION: ICD-10-CM

## 2025-01-08 RX ORDER — METOPROLOL SUCCINATE 50 MG/1
25 TABLET, EXTENDED RELEASE ORAL DAILY
Qty: 45 TABLET | Refills: 3 | Status: SHIPPED | OUTPATIENT
Start: 2025-01-08

## 2025-01-08 NOTE — TELEPHONE ENCOUNTER
Patient called asking for a refill on his metoprolol.  He requests a call when completed.      Thank you    Yohannes Corrales RN

## 2025-01-28 NOTE — PROGRESS NOTES
Thank you, Dr. Alicia, for asking the [facility] team to see Mr. Ravin Swanson to evaluate 3 months post ablation.    Assessment/Recommendations     Assessment/Plan:    Diagnoses and all orders for this visit:  Persistent atrial fibrillation (H) with RVR  Status post ablation of atrial fibrillation  - symptomatic with dyspnea, associated with systolic heart failure, a component of which may be tachycardia-mediated   --s/p PVI ablation 10/28/24 Dr Alicia, no recurrence of atrial fibrillation post ablation per review of [facility] history today, intermittent positional lightheadedness and near syncope with mild orthostatic hypotension resolved after metoprolol dose reduced 6 weeks post ablation  -Metoprolol XL 25 mg daily, continue due to HFrEF  -continue Xarelto daily, take with full meal daily at dinner  -follow up with Dr Landrum July 2025 as planned  -follow up with me 1 year post ablation October 2025    [id number] score of 3 due to age > 75, HF and on Xarelto. Missed dose on 1/26/25, taking with full meal at dinnertime.  X 3 episodes of hematochezia noted since ablation.  He did undergo colonoscopy in May of this year which showed a 7 mm polyp along with diverticulitis in the sigmoid colon along with internal hemorrhoids.  No further follow-up or repeat colonoscopy was recommended due to his age.     HFrEF (heart failure with reduced ejection fraction) (H)  --LVEF 42.7% with small endomyocardial mid-distal inferolateral LV by 8/20/2024 cardiac MRI. Cannot exclude a component of tachycardia-mediated cardiomyopathy   --GDMT: Lasix, metoprolol, Lisinopril, Spironolactone  --TTE scheduled 2/4/25 to re-assess LV function post ablation, results pending  -managed by HF clinic, next appointment scheduled for 3/5/25    Possible WALTER (obstructive sleep apnea)  --patient canceled sleep study.  We discussed correlation between untreated sleep apnea and atrial fibrillation.  Recommend rescheduling  and/or further follow-up at later date          History of Present Illness/Subjective     Ravin Swanson is a very pleasant 84 year old male who comes in today for EP follow up 3 months post ablation    Ravin Swanson has a known history of persistent atrial fibrillation with severe bi-atrial dilation and fibrosis by 8/20/2024 cardiac MRI, systolic heart failure (LVEF 42.7% with small endomyocardial mid-distal inferolateral LV by 8/20/2024 cardiac MRI), HTN, obesity, WALTER .     Mr. Swanson's atrial fibrillation history is as summarized below:  Symptoms: dyspnea  Symptom onset date: around time of COVID infection 11/2023  Diagnosis date: 1/8/2024 by TTE (AF/RVR), LVEF 40-45%  Admissions/ER visits: none  Prior medical therapies: no prior AADs  Prior DCCVs: none  Prior ablations: none  Percutaneous left atrial appendage occlusion: none     He notes dyspnea with varying degrees of physical activity.  He exercises at the gym on most days.  He denies chest pain, syncope.    Bill is maintaining normal sinus rhythm 3 months post ablation per review of [facility] history today.  Ventricular rates are controlled between 70s and 90s, remains on metoprolol XL 25 mg daily.  He has been dealing with some positional lightheadedness, orthostatic hypotension and near syncope on the 50 mg dose.  Symptoms resolved once metoprolol dose was reduced.  He remains on Xarelto daily for stroke prevention, taking with full meal daily at dinnertime.  He did miss his dose of Xarelto on 1/26/2025.  Found to have a reduced LV function on cardiac MRI in August at 42%.  He is scheduled for repeat TTE on 2/4/2025 to reevaluate LV function now that he is 3 months post ablation.  He appears euvolemic on exam today. GDMT includes Lasix, metoprolol, Lisinopril, Spironolactone.  He is managed by the heart failure clinic.  He was referred to sleep medicine clinic due to his suspected WALTER, he did cancel his sleep study.  He has no intention at this time  of rescheduling his appointment.        Cardiographics (reviewed):  ECGs (tracings independently reviewed)  10/28/24 NSR incomplete RBBB 79 bpm  ms QT/QTC: 422/483 ms (post ablation)  8/20/2024 - AF, 83bpm, inferior Qw  3/6/2024 - AF, 107bpm  2/10/2015 - SR 63bpm     3/7/2024 Holter monitoring (duration 24hrs) (independently reviewed)  Continuous atrial fibrillation, 75 to 136bpm, average 100bpm.  There were no pauses of greater than 3 seconds.  Rare premature ventricular contractions (<1%).  Symptom triggers - none.     8/20/2024 cardiac MRI  1.  Pharmacological Regadenoson stress cardiac MRI is negative for inducible myocardial ischemia.   2.  Pharmacological stress ECG is negative for inducible myocardial ischemia. Atrial fib with controlled  heart rated noted during stress testing.    3. Normal left ventricular size and moderately reduced global systolic function. The quantified left  ventricular ejection fraction is 42.7%.  Small area of endomyocardial fibrosis involving the mid to distal  inferolateral wall segments.  No consistent with non-coronary pattern such as prior myocarditis.      Pre-contrast T1: 993 ms (normal).  4.  Mildly enlarged right ventricular size with moderate to severe reduction in right ventricular systolic  function.  RVEF: 31.8%.    5.  No significant valvular abnormalities.  6.  Severe right and left atrial enlargement.  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   7.  Mild to moderate ascending aortic enlargement, 43x44 mm (non-contrast enhanced imaging).       1/8/2024 TTE  The left ventricle is normal in size.  There is moderate concentric left ventricular hypertrophy.  The visual ejection fraction is 40-45%.  There is mild-moderate global hypokinesia of the left ventricle.  The right ventricle is mildly dilated.  Moderately decreased right ventricular systolic function  The left atrium is severely dilated.  The right ventricular systolic pressure is approximated at 30mmHg plus  the  right atrial pressure.  Ascending Aorta dilatation is present.  The rhythm was rapid atrial fibrillation.  There is no comparison study available.                      Problem List:  Patient Active Problem List   Diagnosis    Herpes Zoster (Shingles)    Morbid obesity (H)    Tachycardia induced cardiomyopathy (H)    Acute systolic congestive heart failure (H)    Persistent atrial fibrillation (H) with RVR    Possible WALTER (obstructive sleep apnea)    Primary malignant neoplasm of prostate (H)    Male urinary stress incontinence    History of radiation therapy    Disappearance and death of family member    Abnormal prostate specific antigen (PSA)    Status post ablation of atrial fibrillation    HFrEF (heart failure with reduced ejection fraction) (H)    Suspected sleep apnea     Revi  e  Physical Examination Review of Systems   w [facility]  There were no vitals taken for this visit.  There is no height or weight on file to calculate BMI.  Wt Readings from Last 3 Encounters:   01/10/25 122.9 kg (271 lb)   12/09/24 121.3 kg (267 lb 8 oz)   10/28/24 123.8 kg (273 lb)     General Appearance:   Alert, well-appearing and in no acute distress.   HEENT: Atraumatic, normocephalic.  No scleral icterus, normal conjunctivae; mucous membranes pink and moist.     Chest: Chest symmetric, spine straight.   Lungs:   Respirations unlabored: Lungs are clear to auscultation.   Cardiovascular:   Normal first and second heart sounds with no murmurs, rubs, or gallops.  Regular, regular.   Normal JVD, no edema.       Extremities: No cyanosis or clubbing   Musculoskeletal: Moves all extremities   Skin: Warm, dry, intact.    Neurologic: Mood and affect are appropriate, alert and oriented to person, place, time, and situation     ROS: 10 point ROS neg other than the symptoms noted above in the HPI.     Medical History  Surgical History Family History Social History     Past Medical History:   Diagnosis Date    Hypertension     Past Surgical  "History:   Procedure Laterality Date    COLONOSCOPY      COLONOSCOPY N/A 5/3/2024    Procedure: COLONOSCOPY with POLYPECTOMY;  Surgeon: Barry Hayes MD;  Location: [facility] Main OR    EP ABLATION PULMONARY VEIN ISOLATION N/A 10/28/2024    Procedure: Ablation Atrial Fibrillation;  Surgeon: Isak Alicia MD;  Location: [facility] CV    GENITOURINARY SURGERY      No family history on file. History   Smoking Status    Former    Types: Cigarettes   Smokeless Tobacco    Never     Social History    Substance and Sexual Activity      Alcohol use: Yes        Comment: minimal       Medications  Allergies     Current Outpatient Medications   Medication Sig Dispense Refill    furosemide (LASIX) 20 MG tablet Take 2 tablets (40 mg) by mouth daily 180 tablet 1    lisinopril (ZESTRIL) 5 MG tablet Take 1 tablet (5 mg) by mouth daily 90 tablet 3    metoprolol succinate ER (TOPROL XL) 50 MG 24 hr tablet Take 0.5 tablets (25 mg) by mouth daily. 45 tablet 3    rivaroxaban ANTICOAGULANT (XARELTO) 20 MG TABS tablet Take 1 tablet (20 mg) by mouth daily (with dinner) 30 tablet 11    spironolactone (ALDACTONE) 25 MG tablet Take 1 tablet (25 mg) by mouth daily 90 tablet 3      No Known Allergies   Medical, surgical, family, social history, and medications were all reviewed and updated as necessary.   Lab Results    Chemistry/lipid CBC Cardiac Enzymes/BNP/TSH/INR   Recent Labs   Lab Test 01/03/25  1135   CHOL 152   HDL 40   LDL 85   TRIG 133     Recent Labs   Lab Test 01/03/25  1135 01/03/24  0810 05/25/21  1116   LDL 85 67 77     Recent Labs   Lab Test 01/03/25  1135      POTASSIUM 4.3   CHLORIDE 101   CO2 24   GLC 95   BUN 17.2   CR 1.12   GFRESTIMATED 65   CHANDA 9.2     Recent Labs   Lab Test 01/03/25  1135 10/28/24  1032 10/23/24  1131   CR 1.12 1.24* 1.16     No results for input(s): \"A1C\" in the last 63987 hours.       Recent Labs   Lab Test 10/28/24  1032   WBC 6.1   HGB 15.9   HCT 49.0   MCV 95    " "    Recent Labs   Lab Test 10/28/24  1032 10/23/24  1131   HGB 15.9 15.5    No results for input(s): \"TROPONINI\" in the last 10626 hours.  Recent Labs   Lab Test 03/27/24  1146   NTBNP 742     Recent Labs   Lab Test 01/03/25  1135   TSH 1.49     No results for input(s): \"INR\" in the last 56002 hours.       Total Time- 25 minutes spent on date of encounter doing chart review, history and exam, documentation and further activities as noted above.  This note has been dictated using voice recognition software. Any grammatical, typographical, or context distortions are unintentional and inherent to the software.    Holly Fabian [facility]  [phone]                       "

## 2025-01-29 ENCOUNTER — OFFICE VISIT (OUTPATIENT)
Dept: CARDIOLOGY | Facility: CLINIC | Age: 85
End: 2025-01-29
Attending: NURSE PRACTITIONER
Payer: COMMERCIAL

## 2025-01-29 VITALS
HEART RATE: 93 BPM | OXYGEN SATURATION: 98 % | WEIGHT: 264.25 LBS | BODY MASS INDEX: 35.84 KG/M2 | SYSTOLIC BLOOD PRESSURE: 96 MMHG | DIASTOLIC BLOOD PRESSURE: 64 MMHG

## 2025-01-29 DIAGNOSIS — Z98.890 STATUS POST ABLATION OF ATRIAL FIBRILLATION: ICD-10-CM

## 2025-01-29 DIAGNOSIS — I50.20 HFREF (HEART FAILURE WITH REDUCED EJECTION FRACTION) (H): ICD-10-CM

## 2025-01-29 DIAGNOSIS — G47.33 OSA (OBSTRUCTIVE SLEEP APNEA): ICD-10-CM

## 2025-01-29 DIAGNOSIS — I48.19 PERSISTENT ATRIAL FIBRILLATION (H): Primary | ICD-10-CM

## 2025-01-29 DIAGNOSIS — Z86.79 STATUS POST ABLATION OF ATRIAL FIBRILLATION: ICD-10-CM

## 2025-01-29 DIAGNOSIS — I48.19 PERSISTENT ATRIAL FIBRILLATION (H): ICD-10-CM

## 2025-01-29 PROCEDURE — 99214 OFFICE O/P EST MOD 30 MIN: CPT | Performed by: NURSE PRACTITIONER

## 2025-01-29 NOTE — PATIENT INSTRUCTIONS
Ravin Swanson,    It was a pleasure to see you today at the [facility].     My recommendations after this visit include:    -continue Metoprolol XL 25 mg daily  -continue Xarelto daily, take with full meal at dinner  -continue interval EKG monitoring by [place]  -ECHO is scheduled for 24/25 at [facility]  -follow up with heart failure clinic on 3/5/25  -follow up with Dr Landrum in July 2025  -follow up with me again 1 year post ablation end of October 2025    Holly Fabian CNP  [facility], Electrophysiology  [phone]  EP nurses [phone]

## 2025-01-29 NOTE — LETTER
1/29/2025    Ravin Dockery MD  [facility] [address] [place] [address]    RE: Ravin Swanson       Dear Colleague,     I had the pleasure of seeing Ravin Swanson in the [facility].    Thank you, Dr. Alicia, for asking the [facility] Heart Care team to see . Ravin Swanson to evaluate 3 months post ablation.    Assessment/Recommendations     Assessment/Plan:    Diagnoses and all orders for this visit:  Persistent atrial fibrillation (H) with RVR  Status post ablation of atrial fibrillation  - symptomatic with dyspnea, associated with systolic heart failure, a component of which may be tachycardia-mediated   --s/p PVI ablation 10/28/24 Dr Alicia, no recurrence of atrial fibrillation post ablation per review of [facility] history today, intermittent positional lightheadedness and near syncope with mild orthostatic hypotension resolved after metoprolol dose reduced 6 weeks post ablation  -Metoprolol XL 25 mg daily, continue due to HFrEF  -continue Xarelto daily, take with full meal daily at dinner  -follow up with Dr Landrum July 2025 as planned  -follow up with me 1 year post ablation October 2025    [id number] score of 3 due to age > 75, HF and on Xarelto. Missed dose on 1/26/25, taking with full meal at dinnertime.  X 3 episodes of hematochezia noted since ablation.  He did undergo colonoscopy in May of this year which showed a 7 mm polyp along with diverticulitis in the sigmoid colon along with internal hemorrhoids.  No further follow-up or repeat colonoscopy was recommended due to his age.     HFrEF (heart failure with reduced ejection fraction) (H)  --LVEF 42.7% with small endomyocardial mid-distal inferolateral LV by 8/20/2024 cardiac MRI. Cannot exclude a component of tachycardia-mediated cardiomyopathy   --GDMT: Lasix, metoprolol, Lisinopril, Spironolactone  --TTE scheduled 2/4/25 to re-assess LV function post ablation, results pending  -managed by HF  clinic, next appointment scheduled for 3/5/25    Possible WALTER (obstructive sleep apnea)  --patient canceled sleep study.  We discussed correlation between untreated sleep apnea and atrial fibrillation.  Recommend rescheduling and/or further follow-up at later date          History of Present Illness/Subjective     Ravin Swanson is a very pleasant 84 year old male who comes in today for EP follow up 3 months post ablation    Ravin Swanson has a known history of persistent atrial fibrillation with severe bi-atrial dilation and fibrosis by 8/20/2024 cardiac MRI, systolic heart failure (LVEF 42.7% with small endomyocardial mid-distal inferolateral LV by 8/20/2024 cardiac MRI), HTN, obesity, WALTER .     Mr. Swanson's atrial fibrillation history is as summarized below:  Symptoms: dyspnea  Symptom onset date: around time of COVID infection 11/2023  Diagnosis date: 1/8/2024 by TTE (AF/RVR), LVEF 40-45%  Admissions/ER visits: none  Prior medical therapies: no prior AADs  Prior DCCVs: none  Prior ablations: none  Percutaneous left atrial appendage occlusion: none     He notes dyspnea with varying degrees of physical activity.  He exercises at the gym on most days.  He denies chest pain, syncope.    Bill is maintaining normal sinus rhythm 3 months post ablation per review of [facility] history today.  Ventricular rates are controlled between 70s and 90s, remains on metoprolol XL 25 mg daily.  He has been dealing with some positional lightheadedness, orthostatic hypotension and near syncope on the 50 mg dose.  Symptoms resolved once metoprolol dose was reduced.  He remains on Xarelto daily for stroke prevention, taking with full meal daily at dinnertime.  He did miss his dose of Xarelto on 1/26/2025.  Found to have a reduced LV function on cardiac MRI in August at 42%.  He is scheduled for repeat TTE on 2/4/2025 to reevaluate LV function now that he is 3 months post ablation.  He appears euvolemic on exam today. [facility], metoprolol, Lisinopril, Spironolactone.  He is managed by the heart failure clinic.  He was referred to sleep medicine clinic due to his suspected WALTER, he did cancel his sleep study.  He has no intention at this time of rescheduling his appointment.        Cardiographics (reviewed):  ECGs (tracings independently reviewed)  10/28/24 NSR incomplete RBBB 79 bpm  ms QT/QTC: 422/483 ms (post ablation)  8/20/2024 - AF, 83bpm, inferior Qw  3/6/2024 - AF, 107bpm  2/10/2015 - SR 63bpm     3/7/2024 Holter monitoring (duration 24hrs) (independently reviewed)  Continuous atrial fibrillation, 75 to 136bpm, average 100bpm.  There were no pauses of greater than 3 seconds.  Rare premature ventricular contractions (<1%).  Symptom triggers - none.     8/20/2024 cardiac MRI  1.  Pharmacological Regadenoson stress cardiac MRI is negative for inducible myocardial ischemia.   2.  Pharmacological stress ECG is negative for inducible myocardial ischemia. Atrial fib with controlled  heart rated noted during stress testing.    3. Normal left ventricular size and moderately reduced global systolic function. The quantified left  ventricular ejection fraction is 42.7%.  Small area of endomyocardial fibrosis involving the mid to distal  inferolateral wall segments.  No consistent with non-coronary pattern such as prior myocarditis.      Pre-contrast T1: 993 ms (normal).  4.  Mildly enlarged right ventricular size with moderate to severe reduction in right ventricular systolic  function.  RVEF: 31.8%.    5.  No significant valvular abnormalities.  6.  Severe right and left atrial enlargement.  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   7.  Mild to moderate ascending aortic enlargement, 43x44 mm (non-contrast enhanced imaging).       1/8/2024 TTE  The left ventricle is normal in size.  There is moderate concentric left ventricular hypertrophy.  The visual ejection fraction is 40-45%.  There is mild-moderate global hypokinesia  of the left ventricle.  The right ventricle is mildly dilated.  Moderately decreased right ventricular systolic function  The left atrium is severely dilated.  The right ventricular systolic pressure is approximated at 30mmHg plus the  right atrial pressure.  Ascending Aorta dilatation is present.  The rhythm was rapid atrial fibrillation.  There is no comparison study available.                      Problem List:  Patient Active Problem List   Diagnosis     Herpes Zoster (Shingles)     Morbid obesity (H)     Tachycardia induced cardiomyopathy (H)     Acute systolic congestive heart failure (H)     Persistent atrial fibrillation (H) with RVR     Possible WALTER (obstructive sleep apnea)     Primary malignant neoplasm of prostate (H)     Male urinary stress incontinence     History of radiation therapy     Disappearance and death of family member     Abnormal prostate specific antigen (PSA)     Status post ablation of atrial fibrillation     HFrEF (heart failure with reduced ejection fraction) (H)     Suspected sleep apnea     Revi  e  Physical Examination Review of Systems   w [facility]  There were no vitals taken for this visit.  There is no height or weight on file to calculate BMI.  Wt Readings from Last 3 Encounters:   01/10/25 122.9 kg (271 lb)   12/09/24 121.3 kg (267 lb 8 oz)   10/28/24 123.8 kg (273 lb)     General Appearance:   Alert, well-appearing and in no acute distress.   HEENT: Atraumatic, normocephalic.  No scleral icterus, normal conjunctivae; mucous membranes pink and moist.     Chest: Chest symmetric, spine straight.   Lungs:   Respirations unlabored: Lungs are clear to auscultation.   Cardiovascular:   Normal first and second heart sounds with no murmurs, rubs, or gallops.  Regular, regular.   Normal JVD, no edema.       Extremities: No cyanosis or clubbing   Musculoskeletal: Moves all extremities   Skin: Warm, dry, intact.    Neurologic: Mood and affect are appropriate, alert and oriented to person,  place, time, and situation     ROS: 10 point ROS neg other than the symptoms noted above in the HPI.     Medical History  Surgical History Family History Social History     Past Medical History:   Diagnosis Date     Hypertension     Past Surgical History:   Procedure Laterality Date     COLONOSCOPY       COLONOSCOPY N/A 5/3/2024    Procedure: COLONOSCOPY with POLYPECTOMY;  Surgeon: Barry Hayes MD;  Location: [facility] Main OR     EP ABLATION PULMONARY VEIN ISOLATION N/A 10/28/2024    Procedure: Ablation Atrial Fibrillation;  Surgeon: Isak Alicia MD;  Location: [facility] CV     GENITOURINARY SURGERY      No family history on file. History   Smoking Status     Former     Types: Cigarettes   Smokeless Tobacco     Never     Social History    Substance and Sexual Activity      Alcohol use: Yes        Comment: minimal       Medications  Allergies     Current Outpatient Medications   Medication Sig Dispense Refill     furosemide (LASIX) 20 MG tablet Take 2 tablets (40 mg) by mouth daily 180 tablet 1     lisinopril (ZESTRIL) 5 MG tablet Take 1 tablet (5 mg) by mouth daily 90 tablet 3     metoprolol succinate ER (TOPROL XL) 50 MG 24 hr tablet Take 0.5 tablets (25 mg) by mouth daily. 45 tablet 3     rivaroxaban ANTICOAGULANT (XARELTO) 20 MG TABS tablet Take 1 tablet (20 mg) by mouth daily (with dinner) 30 tablet 11     spironolactone (ALDACTONE) 25 MG tablet Take 1 tablet (25 mg) by mouth daily 90 tablet 3      No Known Allergies   Medical, surgical, family, social history, and medications were all reviewed and updated as necessary.   Lab Results    Chemistry/lipid CBC Cardiac Enzymes/BNP/TSH/INR   Recent Labs   Lab Test 01/03/25  1135   CHOL 152   HDL 40   LDL 85   TRIG 133     Recent Labs   Lab Test 01/03/25  1135 01/03/24  0810 05/25/21  1116   LDL 85 67 77     Recent Labs   Lab Test 01/03/25  1135      POTASSIUM 4.3   CHLORIDE 101   CO2 24   GLC 95   BUN 17.2   CR 1.12   GFRESTIMATED  "65   CHANDA 9.2     Recent Labs   Lab Test 01/03/25  1135 10/28/24  1032 10/23/24  1131   CR 1.12 1.24* 1.16     No results for input(s): \"A1C\" in the last 79289 hours.       Recent Labs   Lab Test 10/28/24  1032   WBC 6.1   HGB 15.9   HCT 49.0   MCV 95        Recent Labs   Lab Test 10/28/24  1032 10/23/24  1131   HGB 15.9 15.5    No results for input(s): \"TROPONINI\" in the last 71702 hours.  Recent Labs   Lab Test 03/27/24  1146   NTBNP 742     Recent Labs   Lab Test 01/03/25  1135   TSH 1.49     No results for input(s): \"INR\" in the last 03347 hours.       Total Time- 25 minutes spent on date of encounter doing chart review, history and exam, documentation and further activities as noted above.  This note has been dictated using voice recognition software. Any grammatical, typographical, or context distortions are unintentional and inherent to the software.    Holly Fabian CNP  [facility]  [phone]                         Thank you for allowing me to participate in the care of your patient.      Sincerely,     Holly Fabian NP     [facility]  cc:   Holly Fabian NP  [address], [address]  [place] [address]      "

## 2025-02-04 ENCOUNTER — HOSPITAL ENCOUNTER (OUTPATIENT)
Dept: CARDIOLOGY | Facility: CLINIC | Age: 85
Discharge: HOME | End: 2025-02-04
Payer: COMMERCIAL

## 2025-02-04 DIAGNOSIS — I48.19 PERSISTENT ATRIAL FIBRILLATION (H): ICD-10-CM

## 2025-02-04 DIAGNOSIS — I42.9 CARDIOMYOPATHY, UNSPECIFIED TYPE (H): ICD-10-CM

## 2025-02-04 LAB — LVEF ECHO: NORMAL

## 2025-02-04 PROCEDURE — 93306 TTE W/DOPPLER COMPLETE: CPT | Mod: 26 | Performed by: STUDENT IN AN ORGANIZED HEALTH CARE EDUCATION/TRAINING PROGRAM

## 2025-02-04 PROCEDURE — 93306 TTE W/DOPPLER COMPLETE: CPT

## 2025-02-19 DIAGNOSIS — I50.22 CHRONIC SYSTOLIC CONGESTIVE HEART FAILURE (H): ICD-10-CM

## 2025-02-19 RX ORDER — FUROSEMIDE 20 MG/1
40 TABLET ORAL DAILY
Qty: 180 TABLET | Refills: 0 | Status: SHIPPED | OUTPATIENT
Start: 2025-02-19

## 2025-02-26 DIAGNOSIS — I48.19 PERSISTENT ATRIAL FIBRILLATION (H): ICD-10-CM

## 2025-02-26 RX ORDER — RIVAROXABAN 20 MG/1
20 TABLET, FILM COATED ORAL
Qty: 90 TABLET | Refills: 1 | Status: SHIPPED | OUTPATIENT
Start: 2025-02-26

## 2025-03-04 PROBLEM — R29.818 SUSPECTED SLEEP APNEA: Status: RESOLVED | Noted: 2024-12-09 | Resolved: 2025-03-04

## 2025-03-05 ENCOUNTER — OFFICE VISIT (OUTPATIENT)
Dept: CARDIOLOGY | Facility: CLINIC | Age: 85
End: 2025-03-05
Attending: NURSE PRACTITIONER
Payer: COMMERCIAL

## 2025-03-05 VITALS
DIASTOLIC BLOOD PRESSURE: 68 MMHG | WEIGHT: 266.9 LBS | HEART RATE: 80 BPM | RESPIRATION RATE: 16 BRPM | HEIGHT: 72 IN | OXYGEN SATURATION: 99 % | BODY MASS INDEX: 36.15 KG/M2 | SYSTOLIC BLOOD PRESSURE: 98 MMHG

## 2025-03-05 DIAGNOSIS — R00.0 TACHYCARDIA INDUCED CARDIOMYOPATHY (H): Primary | ICD-10-CM

## 2025-03-05 DIAGNOSIS — E66.01 MORBID OBESITY (H): ICD-10-CM

## 2025-03-05 DIAGNOSIS — Z86.79 STATUS POST ABLATION OF ATRIAL FIBRILLATION: ICD-10-CM

## 2025-03-05 DIAGNOSIS — G47.33 OSA (OBSTRUCTIVE SLEEP APNEA): ICD-10-CM

## 2025-03-05 DIAGNOSIS — I95.2 HYPOTENSION DUE TO DRUGS: ICD-10-CM

## 2025-03-05 DIAGNOSIS — I50.32 HEART FAILURE WITH IMPROVED EJECTION FRACTION (HFIMPEF) (H): ICD-10-CM

## 2025-03-05 DIAGNOSIS — Z98.890 STATUS POST ABLATION OF ATRIAL FIBRILLATION: ICD-10-CM

## 2025-03-05 DIAGNOSIS — I43 TACHYCARDIA INDUCED CARDIOMYOPATHY (H): Primary | ICD-10-CM

## 2025-03-05 DIAGNOSIS — I48.19 PERSISTENT ATRIAL FIBRILLATION (H): ICD-10-CM

## 2025-03-05 PROBLEM — I50.21 ACUTE SYSTOLIC CONGESTIVE HEART FAILURE (H): Status: RESOLVED | Noted: 2024-03-06 | Resolved: 2025-03-05

## 2025-03-05 RX ORDER — LISINOPRIL 5 MG/1
2.5 TABLET ORAL DAILY
Status: SHIPPED
Start: 2025-03-05

## 2025-03-05 NOTE — PATIENT INSTRUCTIONS
Ravin Swanson,    It was a pleasure to see you today at the [facility].     My recommendations after this visit include:    - Reduce lisinopril from 5 mg to 2.5 mg daily due to low BP and dizziness    - Low sodium diet < 2000 mg/day, daily weight monitoring, and maintain adequate fluid intake at 50 to 60 ounces per day    -Please call if you experience persistent weight gain 2 to 3 pounds 2 days in a row or 5 pounds in a week with shortness of breath, abdominal bloating and leg swelling    - Follow up with Shalom Chi CNP in 6-8 weeks     - Follow up with Dr. Landrum in 4 months     - Please call Heart Failure Nurse Line at [phone], if you have any questions or concerns

## 2025-03-05 NOTE — LETTER
3/5/2025    Ravin Dockery MD  [facility] [address] [place] [address]    RE: Ravin Swanson       Dear Colleague,     I had the pleasure of seeing Ravin Swanson in the [facility].          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    Echocardiogram in February 2025 showed LVEF improved to 55 to 60% with grade I early diastolic dysfunction.  Previous echo with LVEF of 45 to 50% per ELY in October 2024.  Cardiac stress MRI negative for inducible myocardial ischemia in August 2024.    Current home weight is 263 lbs. Weight is down 3 pounds.  Dyspnea on exertion and fatigue improved since ablation.  Patient is mild hypervolemic on exam.     Heart failure regimen includes:    -Beta-blocker therapy with metoprolol succinate 25 mg daily dose reduced due to dizziness-    -ACE inhibitor with lisinopril 5 mg daily    -Aldosterone blocker therapy with spironolactone 25 mg daily    -Not on SGLT2 Inhibitor -not started due to dizziness with low blood pressure    -Diuretic therapy with furosemide 40 mg daily    Stable renal function in January 2025.    # Hypotension: Likely due to heart failure medications.  He reported an episode of dizziness recently needing to sit for 5 to 10 minutes before he recovered.  He denies syncope or fall.    #  Persistent Atrial fibrillation with status post PVI ablation in October 2024: Denies symptoms.  He expressed concern about cost issue with Xarelto.  We briefly discussed about [facility].  Patient expressed interest.  However he would like to try alternative anticoagulation therapy first.    #  Morbid obesity with BMI of  36.20: Patient exercises regularly.    # Possible obstructive sleep apnea: Patient had a sleep evaluation consultation.  It appears that he did not  complete sleep  "study.    Plan/Recommendation:  -Reduce lisinopril from 5 mg to 2.5 mg daily due to symptomatic hypotension   - Will discuss with Dr. Landrum about alternative therapy for Xarelto.  Consider referral to [facility] clinic in the near future if patient wants to come off of anticoagulation therapy due to cost issue  - Continue on low salt diet <2000 mg per day, daily weight monitoring, and maintain adequate fluid intake with 50-60 ounces per day   - Encouraged patient to call back if no improvement with blood pressure and dizziness.    Follow up with Shalom in 6-8 weeks.   Dr. Landrum in July 2025.      The longitudinal plan of care for cardiomyopathy likely tachycardia mediated, heart failure with reduced ejection fraction was addressed during this visit.?Due to the added complexity in care, I will continue to support Ravin Swanson in the subsequent management of this condition(s) and with the ongoing continuity of care of this condition(s)\".     History of Present Illness/Subjective    Mr. Ravin Swanson is a 84 year old male with a past medical history of morbid obesity, COVID infection in November 2023, dyspnea on exertion, and tachycardia mediated cardiomyopathy, persistent atrial fibrillation and chronic systolic heart failure who is seen at [facility] for continued heart failure follow-up.    During last heart failure clinic visit, patient reported 10 pounds weight loss.  He continued to have shortness of breath on exertion especially walking up an incline which is unchanged from baseline.  He has some fatigue.  His lower extremity edema resolved.    Today, Bill reports feeling much improved with his fatigue and dyspnea on exertion and activity tolerance since ablation.  He denies lightheadedness, shortness of breath, orthopnea, PND, palpitations, chest pain, abdominal fullness/bloating, and lower extremity edema.     He reports following low-sodium diet.  He reports adequate fluid " intake.      He continues to go to gym and workout 5-6 times a week for about 45 minutes at a time.    Echo from 2/5/2024-reviewed  Interpretation Summary   The left ventricle is normal in size. There is mild concentric left  ventricular hypertrophy.  Left ventricular systolic function is normal. The visual ejection fraction is  55-60%. No regional wall motion abnormalities noted.  Grade I or early diastolic dysfunction.   The right ventricle is mildly dilated. Mildly decreased right ventricular  systolic function  Normal left atrial size. Borderline right atrial enlargement.  A bicuspid aortic valve cannot be excluded. No aortic regurgitation is  present. No aortic stenosis is present.  IVC diameter and respiratory changes fall into an intermediate range  suggesting an RA pressure of 8 mmHg.  Ascending Aorta dilatation is present measuring 4.3 cm.  When compared with previous study from 10/28/2024 the LVEF has improved.    ELY from October 2024-reviewed  Interpretation Summary     No thrombus is detected in the left atrial appendage.  A contrast injection (Bubble Study) was performed that was negative for flow  across the interatrial septum.  The left ventricle is normal in size.  The visual ejection fraction is 45-50%.  There is mild global hypokinesia of the left ventricle.  No significant valve disease  Mildly dilated ascending aorta, 41mm    ECHO from 1/8/24-Reviewed:   Interpretation Summary   The left ventricle is normal in size.  There is moderate concentric left ventricular hypertrophy.  The visual ejection fraction is 40-45%.  There is mild-moderate global hypokinesia of the left ventricle.  The right ventricle is mildly dilated.  Moderately decreased right ventricular systolic function  The left atrium is severely dilated.  The right ventricular systolic pressure is approximated at 30mmHg plus the  right atrial pressure.  Ascending Aorta dilatation is present.  The rhythm was rapid atrial  fibrillation.  There is no comparison study available.     Physical Examination Review of Systems   BP 98/68 (BP Location: Right arm, Patient Position: Sitting, Cuff Size: Adult Large)   Pulse 80   Resp 16   Ht 1.829 m (6')   Wt 121.1 kg (266 lb 14.4 oz)   SpO2 99%   BMI 36.20 kg/m    Body mass index is 36.2 kg/m .  Wt Readings from Last 3 Encounters:   03/05/25 121.1 kg (266 lb 14.4 oz)   01/29/25 119.9 kg (264 lb 4 oz)   01/10/25 122.9 kg (271 lb)     General Appearance:   no distress, normal body habitus   ENT/Mouth: membranes moist, no oral lesions or bleeding gums.      EYES:  no scleral icterus, normal conjunctivae   Neck: no  thyromegaly   Chest/Lungs:   lungs are clear to auscultation, no rales or wheezing, equal chest wall expansion    Cardiovascular:   Heart rate regular; Normal first and second heart sounds with no murmurs, rubs, or gallops; the carotid, radial and posterior tibial pulses are intact, JVP is difficult to assess due to the patient's obesity and body habitus   , trace edema bilaterally    Abdomen:  Large but soft; organomegaly, masses, bruits, or tenderness; bowel sounds are present   Extremities   no cyanosis or clubbing; CMS intact   Skin: no xanthelasma, warm.    Neurologic: normal  bilateral, no tremors   Psychiatric: alert and oriented x3, calm                                                    Negative unless noted in HPI     Medical History  Surgical History Family History Social History   Past Medical History:   Diagnosis Date     Hypertension     Past Surgical History:   Procedure Laterality Date     COLONOSCOPY       COLONOSCOPY N/A 5/3/2024    Procedure: COLONOSCOPY with POLYPECTOMY;  Surgeon: Barry Hayes MD;  Location: [facility] Main OR     EP ABLATION PULMONARY VEIN ISOLATION N/A 10/28/2024    Procedure: Ablation Atrial Fibrillation;  Surgeon: Isak Alicia MD;  Location: [facility] CV     GENITOURINARY SURGERY      No family history on  file. Social History     Socioeconomic History     Marital status:      Spouse name: Not on file     Number of children: Not on file     Years of education: Not on file     Highest education level: Not on file   Occupational History     Not on file   Tobacco Use     Smoking status: Former     Current packs/day: 0.00     Types: Cigarettes     Quit date:      Years since quittin.2     Smokeless tobacco: Never   Vaping Use     Vaping status: Never Used   Substance and Sexual Activity     Alcohol use: Yes     Comment: minimal     Drug use: Never     Sexual activity: Not on file   Other Topics Concern     Not on file   Social History Narrative     Not on file     Social Drivers of Health     Financial Resource Strain: Not on file   Food Insecurity: Not on file   Transportation Needs: Not on file   Physical Activity: Not on file   Stress: Not on file   Social Connections: Not on file   Interpersonal Safety: Low Risk  (10/28/2024)    Interpersonal Safety      Do you feel physically and emotionally safe where you currently live?: Yes      Within the past 12 months, have you been hit, slapped, kicked or otherwise physically hurt by someone?: No      Within the past 12 months, have you been humiliated or emotionally abused in other ways by your partner or ex-partner?: No   Housing Stability: Not on file          Medications  Allergies   Current Outpatient Medications   Medication Sig Dispense Refill     furosemide (LASIX) 20 MG tablet TAKE 2 TABLETS(40 MG) BY MOUTH DAILY 180 tablet 0     lisinopril (ZESTRIL) 5 MG tablet Take 0.5 tablets (2.5 mg) by mouth daily.       metoprolol succinate ER (TOPROL XL) 50 MG 24 hr tablet Take 0.5 tablets (25 mg) by mouth daily. 45 tablet 3     rivaroxaban ANTICOAGULANT (XARELTO ANTICOAGULANT) 20 MG TABS tablet TAKE 1 TABLET(20 MG) BY MOUTH DAILY WITH DINNER 90 tablet 1     spironolactone (ALDACTONE) 25 MG tablet Take 1 tablet (25 mg) by mouth daily 90 tablet 3    No Known  Allergies      Lab Results    Chemistry/lipid CBC Cardiac Enzymes/BNP/TSH/INR   Lab Results   Component Value Date    CHOL 152 01/03/2025    HDL 40 01/03/2025    TRIG 133 01/03/2025    BUN 17.2 01/03/2025     01/03/2025    CO2 24 01/03/2025    Lab Results   Component Value Date    WBC 6.1 10/28/2024    HGB 15.9 10/28/2024    HCT 49.0 10/28/2024    MCV 95 10/28/2024     10/28/2024    Lab Results   Component Value Date    TSH 1.49 01/03/2025        36 minutes spent on the date of encounter doing chart review, review of test results, interpretation with above tests, patient visit, documentation, and discussion with other provider.        This note has been dictated using voice recognition software. Any grammatical, typographical, or context distortions are unintentional and inherent to the software          Thank you for allowing me to participate in the care of your patient.      Sincerely,     DARLENE Vaughn CNP     [facility]  cc:   DARLENE Vaughn CNP  [address]  [place] [address]

## 2025-03-05 NOTE — PROGRESS NOTES
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    Echocardiogram in February 2025 showed LVEF improved to 55 to 60% with grade I early diastolic dysfunction.  Previous echo with LVEF of 45 to 50% per ELY in October 2024.  Cardiac stress MRI negative for inducible myocardial ischemia in August 2024.    Current home weight is 263 lbs. Weight is down 3 pounds.  Dyspnea on exertion and fatigue improved since ablation.  Patient is mild hypervolemic on exam.     Heart failure regimen includes:    -Beta-blocker therapy with metoprolol succinate 25 mg daily dose reduced due to dizziness-    -ACE inhibitor with lisinopril 5 mg daily    -Aldosterone blocker therapy with spironolactone 25 mg daily    -Not on SGLT2 Inhibitor -not started due to dizziness with low blood pressure    -Diuretic therapy with furosemide 40 mg daily    Stable renal function in January 2025.    # Hypotension: Likely due to heart failure medications.  He reported an episode of dizziness recently needing to sit for 5 to 10 minutes before he recovered.  He denies syncope or fall.    #  Persistent Atrial fibrillation with status post PVI ablation in October 2024: Denies symptoms.  He expressed concern about cost issue with Xarelto.  We briefly discussed about Watchman.  Patient expressed interest.  However he would like to try alternative anticoagulation therapy first.    #  Morbid obesity with BMI of  36.20: Patient exercises regularly.    # Possible obstructive sleep apnea: Patient had a sleep evaluation consultation.  It appears that he did not  complete sleep study.    Plan/Recommendation:  -Reduce lisinopril from 5 mg to 2.5 mg daily due to symptomatic hypotension   - Will discuss with Dr. Landrum about alternative therapy for Xarelto.  Consider referral to watchman clinic in the near future if patient wants to  "come off of anticoagulation therapy due to cost issue  - Continue on low salt diet <2000 mg per day, daily weight monitoring, and maintain adequate fluid intake with 50-60 ounces per day   - Encouraged patient to call back if no improvement with blood pressure and dizziness.    Follow up with Shalom in 6-8 weeks.   Dr. Landrum in July 2025.      The longitudinal plan of care for cardiomyopathy likely tachycardia mediated, heart failure with reduced ejection fraction was addressed during this visit.?Due to the added complexity in care, I will continue to support Ravin Swanson in the subsequent management of this condition(s) and with the ongoing continuity of care of this condition(s)\".     History of Present Illness/Subjective    Mr. Ravin Swanson is a 84 year old male with a past medical history of morbid obesity, COVID infection in November 2023, dyspnea on exertion, and tachycardia mediated cardiomyopathy, persistent atrial fibrillation and chronic systolic heart failure who is seen at [facility] for continued heart failure follow-up.    During last heart failure clinic visit, patient reported 10 pounds weight loss.  He continued to have shortness of breath on exertion especially walking up an incline which is unchanged from baseline.  He has some fatigue.  His lower extremity edema resolved.    Today, Bill reports feeling much improved with his fatigue and dyspnea on exertion and activity tolerance since ablation.  He denies lightheadedness, shortness of breath, orthopnea, PND, palpitations, chest pain, abdominal fullness/bloating, and lower extremity edema.     He reports following low-sodium diet.  He reports adequate fluid intake.      He continues to go to gym and workout 5-6 times a week for about 45 minutes at a time.    Echo from 2/5/2024-reviewed  Interpretation Summary   The left ventricle is normal in size. There is mild concentric left  ventricular hypertrophy.  Left " ventricular systolic function is normal. The visual ejection fraction is  55-60%. No regional wall motion abnormalities noted.  Grade I or early diastolic dysfunction.   The right ventricle is mildly dilated. Mildly decreased right ventricular  systolic function  Normal left atrial size. Borderline right atrial enlargement.  A bicuspid aortic valve cannot be excluded. No aortic regurgitation is  present. No aortic stenosis is present.  IVC diameter and respiratory changes fall into an intermediate range  suggesting an RA pressure of 8 mmHg.  Ascending Aorta dilatation is present measuring 4.3 cm.  When compared with previous study from 10/28/2024 the LVEF has improved.    ELY from October 2024-reviewed  Interpretation Summary     No thrombus is detected in the left atrial appendage.  A contrast injection (Bubble Study) was performed that was negative for flow  across the interatrial septum.  The left ventricle is normal in size.  The visual ejection fraction is 45-50%.  There is mild global hypokinesia of the left ventricle.  No significant valve disease  Mildly dilated ascending aorta, 41mm    ECHO from 1/8/24-Reviewed:   Interpretation Summary   The left ventricle is normal in size.  There is moderate concentric left ventricular hypertrophy.  The visual ejection fraction is 40-45%.  There is mild-moderate global hypokinesia of the left ventricle.  The right ventricle is mildly dilated.  Moderately decreased right ventricular systolic function  The left atrium is severely dilated.  The right ventricular systolic pressure is approximated at 30mmHg plus the  right atrial pressure.  Ascending Aorta dilatation is present.  The rhythm was rapid atrial fibrillation.  There is no comparison study available.     Physical Examination Review of Systems   BP 98/68 (BP Location: Right arm, Patient Position: Sitting, Cuff Size: Adult Large)   Pulse 80   Resp 16   Ht 1.829 m (6')   Wt 121.1 kg (266 lb 14.4 oz)   SpO2 99%    BMI 36.20 kg/m    Body mass index is 36.2 kg/m .  Wt Readings from Last 3 Encounters:   03/05/25 121.1 kg (266 lb 14.4 oz)   01/29/25 119.9 kg (264 lb 4 oz)   01/10/25 122.9 kg (271 lb)     General Appearance:   no distress, normal body habitus   ENT/Mouth: membranes moist, no oral lesions or bleeding gums.      EYES:  no scleral icterus, normal conjunctivae   Neck: no  thyromegaly   Chest/Lungs:   lungs are clear to auscultation, no rales or wheezing, equal chest wall expansion    Cardiovascular:   Heart rate regular; Normal first and second heart sounds with no murmurs, rubs, or gallops; the carotid, radial and posterior tibial pulses are intact, JVP is difficult to assess due to the patient's obesity and body habitus   , trace edema bilaterally    Abdomen:  Large but soft; organomegaly, masses, bruits, or tenderness; bowel sounds are present   Extremities   no cyanosis or clubbing; CMS intact   Skin: no xanthelasma, warm.    Neurologic: normal  bilateral, no tremors   Psychiatric: alert and oriented x3, calm                                                    Negative unless noted in HPI     Medical History  Surgical History Family History Social History   Past Medical History:   Diagnosis Date    Hypertension     Past Surgical History:   Procedure Laterality Date    COLONOSCOPY      COLONOSCOPY N/A 5/3/2024    Procedure: COLONOSCOPY with POLYPECTOMY;  Surgeon: Barry Hayes MD;  Location: [facility] Main OR    EP ABLATION PULMONARY VEIN ISOLATION N/A 10/28/2024    Procedure: Ablation Atrial Fibrillation;  Surgeon: Isak Alicia MD;  Location: [facility] CV    GENITOURINARY SURGERY      No family history on file. Social History     Socioeconomic History    Marital status:      Spouse name: Not on file    Number of children: Not on file    Years of education: Not on file    Highest education level: Not on file   Occupational History    Not on file   Tobacco Use    Smoking status:  Former     Current packs/day: 0.00     Types: Cigarettes     Quit date:      Years since quittin.2    Smokeless tobacco: Never   Vaping Use    Vaping status: Never Used   Substance and Sexual Activity    Alcohol use: Yes     Comment: minimal    Drug use: Never    Sexual activity: Not on file   Other Topics Concern    Not on file   Social History Narrative    Not on file     Social Drivers of Health     Financial Resource Strain: Not on file   Food Insecurity: Not on file   Transportation Needs: Not on file   Physical Activity: Not on file   Stress: Not on file   Social Connections: Not on file   Interpersonal Safety: Low Risk  (10/28/2024)    Interpersonal Safety     Do you feel physically and emotionally safe where you currently live?: Yes     Within the past 12 months, have you been hit, slapped, kicked or otherwise physically hurt by someone?: No     Within the past 12 months, have you been humiliated or emotionally abused in other ways by your partner or ex-partner?: No   Housing Stability: Not on file          Medications  Allergies   Current Outpatient Medications   Medication Sig Dispense Refill    furosemide (LASIX) 20 MG tablet TAKE 2 TABLETS(40 MG) BY MOUTH DAILY 180 tablet 0    lisinopril (ZESTRIL) 5 MG tablet Take 0.5 tablets (2.5 mg) by mouth daily.      metoprolol succinate ER (TOPROL XL) 50 MG 24 hr tablet Take 0.5 tablets (25 mg) by mouth daily. 45 tablet 3    rivaroxaban ANTICOAGULANT (XARELTO ANTICOAGULANT) 20 MG TABS tablet TAKE 1 TABLET(20 MG) BY MOUTH DAILY WITH DINNER 90 tablet 1    spironolactone (ALDACTONE) 25 MG tablet Take 1 tablet (25 mg) by mouth daily 90 tablet 3    No Known Allergies      Lab Results    Chemistry/lipid CBC Cardiac Enzymes/BNP/TSH/INR   Lab Results   Component Value Date    CHOL 152 2025    HDL 40 2025    TRIG 133 2025    BUN 17.2 2025     2025    CO2 24 2025    Lab Results   Component Value Date    WBC 6.1 10/28/2024     HGB 15.9 10/28/2024    HCT 49.0 10/28/2024    MCV 95 10/28/2024     10/28/2024    Lab Results   Component Value Date    TSH 1.49 01/03/2025        36 minutes spent on the date of encounter doing chart review, review of test results, interpretation with above tests, patient visit, documentation, and discussion with other provider.        This note has been dictated using voice recognition software. Any grammatical, typographical, or context distortions are unintentional and inherent to the software

## 2025-03-07 ENCOUNTER — DOCUMENTATION ONLY (OUTPATIENT)
Dept: CARDIOLOGY | Facility: CLINIC | Age: 85
End: 2025-03-07
Payer: COMMERCIAL

## 2025-03-07 DIAGNOSIS — I48.19 PERSISTENT ATRIAL FIBRILLATION (H): Primary | ICD-10-CM

## 2025-03-07 NOTE — PROGRESS NOTES
From: Hedy Landrum MD  Sent: 3/6/2025   1:55 PM CST  To: Jackie Isaacs    Eliquis 5 mg bid  ----- Message -----  From: Jackie Isaacs  Sent: 3/6/2025   7:38 AM CST  To: Hedy Landrum MD    From: Jackie Isaacs  Sent: 3/5/2025   1:53 PM CST  To: Hedy Landrum MD    Hi Dr. Landrum,    Eliquis 5 mg BID or 2.5 mg? Thank you  ----- Message -----  From: Shalom Chi APRN CNP  Sent: 3/5/2025   1:45 PM CST  To: [facility] R[facility] Team [facility]    Please follow up with recommendation from Dr. Landrum.  Thank you!  CY  ----- Message -----  From: Hedy Landrum MD  Sent: 3/5/2025   1:36 PM CST  To: DARLENE Vaughn CNP    Yes, please.  ----- Message -----  From: Shalom Chi APRN CNP  Sent: 3/5/2025  11:51 AM CST  To: Hedy Landrum MD    Hi Dr. Landrum,  Saw Bill in HF clinic today.  He is wondering if he can switch from Xarelto to Eliquis d/t cost issue.  Please address.  Thanks  CY

## 2025-03-13 ENCOUNTER — TRANSFERRED RECORDS (OUTPATIENT)
Dept: HEALTH INFORMATION MANAGEMENT | Facility: CLINIC | Age: 85
End: 2025-03-13

## 2025-03-15 ENCOUNTER — HEALTH MAINTENANCE LETTER (OUTPATIENT)
Age: 85
End: 2025-03-15

## 2025-04-23 DIAGNOSIS — I50.22 CHRONIC SYSTOLIC CONGESTIVE HEART FAILURE (H): Primary | ICD-10-CM

## 2025-04-23 RX ORDER — LISINOPRIL 2.5 MG/1
2.5 TABLET ORAL DAILY
Qty: 90 TABLET | Refills: 0 | Status: SHIPPED | OUTPATIENT
Start: 2025-04-23

## 2025-05-07 ENCOUNTER — OFFICE VISIT (OUTPATIENT)
Dept: CARDIOLOGY | Facility: CLINIC | Age: 85
End: 2025-05-07
Attending: NURSE PRACTITIONER
Payer: COMMERCIAL

## 2025-05-07 VITALS
DIASTOLIC BLOOD PRESSURE: 66 MMHG | BODY MASS INDEX: 35.4 KG/M2 | WEIGHT: 261 LBS | HEART RATE: 92 BPM | OXYGEN SATURATION: 97 % | SYSTOLIC BLOOD PRESSURE: 97 MMHG

## 2025-05-07 DIAGNOSIS — E66.01 MORBID OBESITY (H): ICD-10-CM

## 2025-05-07 DIAGNOSIS — I50.32 HEART FAILURE WITH IMPROVED EJECTION FRACTION (HFIMPEF) (H): Primary | ICD-10-CM

## 2025-05-07 DIAGNOSIS — I48.19 PERSISTENT ATRIAL FIBRILLATION (H): ICD-10-CM

## 2025-05-07 DIAGNOSIS — I95.2 HYPOTENSION DUE TO DRUGS: ICD-10-CM

## 2025-05-07 RX ORDER — LISINOPRIL 2.5 MG/1
2.5 TABLET ORAL DAILY
Qty: 90 TABLET | Refills: 1 | Status: SHIPPED | OUTPATIENT
Start: 2025-05-07

## 2025-05-07 RX ORDER — ABIRATERONE ACETATE 250 MG/1
TABLET ORAL
COMMUNITY
Start: 2025-04-23

## 2025-05-07 RX ORDER — FUROSEMIDE 20 MG/1
20 TABLET ORAL DAILY
Qty: 90 TABLET | Refills: 1 | Status: SHIPPED | OUTPATIENT
Start: 2025-05-07

## 2025-05-07 RX ORDER — HYDROCORTISONE SODIUM SUCCINATE 100 MG/2ML
100 INJECTION INTRAMUSCULAR; INTRAVENOUS
COMMUNITY
Start: 2025-09-12

## 2025-05-07 RX ORDER — HYDROCORTISONE SODIUM SUCCINATE 100 MG/2ML
100 INJECTION INTRAMUSCULAR; INTRAVENOUS
COMMUNITY
Start: 2025-03-28

## 2025-05-07 RX ORDER — HYDROCORTISONE SODIUM SUCCINATE 100 MG/2ML
100 INJECTION INTRAMUSCULAR; INTRAVENOUS
COMMUNITY
Start: 2025-12-05

## 2025-05-07 NOTE — LETTER
5/7/2025    Ravin Dockery MD  [facility] [address] [place] [address]    RE: Ravin Swanson       Dear Colleague,     I had the pleasure of seeing Ravin Swanson in the [facility].          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  NT proBNP is 742 on March 2024.    Echocardiogram in February 2025 showed LVEF improved to 55 to 60% with grade I early diastolic dysfunction.  Previous echo with LVEF of 45 to 50% per ELY in October 2024.  Cardiac stress MRI negative for inducible myocardial ischemia in August 2024.    Current home is 258 lbs. Weight is down another 5 pounds since last heart failure clinic visit.    Patient is well compensated on exam.  Dyspnea on exertion and fatigue improved since ablation.     Heart failure regimen includes:    -Beta-blocker therapy with metoprolol succinate 25 mg daily dose reduced due to dizziness    -ACE inhibitor with lisinopril 2.5 mg daily-dose reduced in the past due to symptomatic hypotension    -Aldosterone blocker therapy with spironolactone 25 mg daily    -Not on SGLT2 Inhibitor -not started due to dizziness with low blood pressure    -Diuretic therapy with furosemide 40 mg daily    Stable renal function in January 2025.    # Hypotension: Blood pressure in clinic today is 97/66.  He reports some improvement in his lightheadedness although he continues to have some lightheadedness and dizziness.  His blood pressure still running low despite cutting back on lisinopril.    #  Persistent Atrial fibrillation with status post PVI ablation in October 2024: He expressed concern about cost issue with Xarelto.  We briefly discussed about Watchman in the past and  patient expressed interest.  However he would like to try alternative anticoagulation therapy first.  He is currently taking leftover supply of Xarelto.  He  "plans to switch to Eliquis if cheaper otherwise he will call back his primary cardiology team  if he decides to remain on Xarelto.    #  Morbid obesity with BMI of 35.40: He continues to exercise regularly.    Plan/Recommendation:  -Reduce furosemide from 40 mg to 20 mg daily.  -Encouraged to call if persistent weight gain with worsening heart failure symptoms  - Continue on low salt diet <2000 mg per day, daily weight monitoring, and maintain adequate fluid intake with 50-60 ounces per day    Follow up with  Dr. Landrum in July 2025.   Follow up with me in 6 months or sooner if needed in heart failure clinic     The longitudinal plan of care for cardiomyopathy likely tachycardia mediated, heart failure with reduced ejection fraction was addressed during this visit.?Due to the added complexity in care, I will continue to support Ravin Swanson in the subsequent management of this condition(s) and with the ongoing continuity of care of this condition(s)\".     History of Present Illness/Subjective    Mr. Ravin Swanson is a 84 year old male with a past medical history of morbid obesity, COVID infection in November 2023, dyspnea on exertion, and tachycardia mediated cardiomyopathy, persistent atrial fibrillation and chronic systolic heart failure who is seen at [facility] for continued heart failure follow-up.    During last heart failure clinic visit, patient reported improved in his fatigue and dyspnea on exertion and activity tolerance since ablation.  Due to symptomatic hypotension, his lisinopril dose was reduced.    Today, Bill reports that he continues to do well from heart failure standpoint.  He continues to have some lightheadedness and dizziness at times although he reduced his lisinopril.  His blood pressure is still running on the low side.  His weight is down another 5 pounds since last heart failure clinic visit.   he reported an episode of feeling fatigued, weak and " dizzy last Sunday when he was throwing his boat.  Apparently this was around lunchtime and he has not had his lunch or had much fluid for the day.  He denies recurrent episodes.  He denies shortness of breath, orthopnea, PND, palpitations, chest pain, abdominal fullness/bloating, and lower extremity edema.     He reports following low-salt diet.  He reports adequate fluid intake.    He continues to go to gym and workout 5-6 times a week for about 45 minutes at a time.    Echo from 2/5/2024-reviewed  Interpretation Summary   The left ventricle is normal in size. There is mild concentric left  ventricular hypertrophy.  Left ventricular systolic function is normal. The visual ejection fraction is  55-60%. No regional wall motion abnormalities noted.  Grade I or early diastolic dysfunction.   The right ventricle is mildly dilated. Mildly decreased right ventricular  systolic function  Normal left atrial size. Borderline right atrial enlargement.  A bicuspid aortic valve cannot be excluded. No aortic regurgitation is  present. No aortic stenosis is present.  IVC diameter and respiratory changes fall into an intermediate range  suggesting an RA pressure of 8 mmHg.  Ascending Aorta dilatation is present measuring 4.3 cm.  When compared with previous study from 10/28/2024 the LVEF has improved.    ELY from October 2024-reviewed  Interpretation Summary   No thrombus is detected in the left atrial appendage.  A contrast injection (Bubble Study) was performed that was negative for flow  across the interatrial septum.  The left ventricle is normal in size.  The visual ejection fraction is 45-50%.  There is mild global hypokinesia of the left ventricle.  No significant valve disease  Mildly dilated ascending aorta, 41mm    ECHO from 1/8/24-Reviewed:   Interpretation Summary   The left ventricle is normal in size.  There is moderate concentric left ventricular hypertrophy.  The visual ejection fraction is 40-45%.  There is  mild-moderate global hypokinesia of the left ventricle.  The right ventricle is mildly dilated.  Moderately decreased right ventricular systolic function  The left atrium is severely dilated.  The right ventricular systolic pressure is approximated at 30mmHg plus the  right atrial pressure.  Ascending Aorta dilatation is present.  The rhythm was rapid atrial fibrillation.  There is no comparison study available.     Physical Examination Review of Systems   BP 97/66 (BP Location: Left arm, Patient Position: Sitting, Cuff Size: Adult Large)   Pulse 92   Wt 118.4 kg (261 lb)   SpO2 97%   BMI 35.40 kg/m    Body mass index is 35.4 kg/m .  Wt Readings from Last 3 Encounters:   05/07/25 118.4 kg (261 lb)   03/05/25 121.1 kg (266 lb 14.4 oz)   01/29/25 119.9 kg (264 lb 4 oz)     General Appearance:   no distress, normal body habitus   ENT/Mouth: membranes moist, no oral lesions or bleeding gums.      EYES:  no scleral icterus, normal conjunctivae   Neck: no  thyromegaly   Chest/Lungs:   lungs are clear to auscultation, no rales or wheezing, equal chest wall expansion    Cardiovascular:   Heart rate regular; Normal first and second heart sounds with no murmurs, rubs, or gallops;JVP is difficult to assess due to the patient's obesity and body habitus   , trace edema bilaterally    Abdomen:  Large but soft; organomegaly, masses, bruits, or tenderness; bowel sounds are present   Extremities   no cyanosis or clubbing; CMS intact   Skin: no xanthelasma, warm.    Neurologic: normal  bilateral, no tremors   Psychiatric: alert and oriented x3, calm                                                    Negative unless noted in HPI     Medical History  Surgical History Family History Social History   Past Medical History:   Diagnosis Date     Hypertension     Past Surgical History:   Procedure Laterality Date     COLONOSCOPY       COLONOSCOPY N/A 5/3/2024    Procedure: COLONOSCOPY with POLYPECTOMY;  Surgeon: Barry Hayes  MD CIARAN;  Location: [facility] Main OR     EP ABLATION PULMONARY VEIN ISOLATION N/A 10/28/2024    Procedure: Ablation Atrial Fibrillation;  Surgeon: Isak Alicia MD;  Location: [facility]     GENITOURINARY SURGERY      No family history on file. Social History     Socioeconomic History     Marital status:      Spouse name: Not on file     Number of children: Not on file     Years of education: Not on file     Highest education level: Not on file   Occupational History     Not on file   Tobacco Use     Smoking status: Former     Current packs/day: 0.00     Types: Cigarettes     Quit date:      Years since quittin.3     Smokeless tobacco: Never   Vaping Use     Vaping status: Never Used   Substance and Sexual Activity     Alcohol use: Yes     Comment: minimal     Drug use: Never     Sexual activity: Not on file   Other Topics Concern     Not on file   Social History Narrative     Not on file     Social Drivers of Health     Financial Resource Strain: Not on file   Food Insecurity: Not on file   Transportation Needs: Not on file   Physical Activity: Not on file   Stress: Not on file   Social Connections: Not on file   Interpersonal Safety: Low Risk  (10/28/2024)    Interpersonal Safety      Do you feel physically and emotionally safe where you currently live?: Yes      Within the past 12 months, have you been hit, slapped, kicked or otherwise physically hurt by someone?: No      Within the past 12 months, have you been humiliated or emotionally abused in other ways by your partner or ex-partner?: No   Housing Stability: Not on file          Medications  Allergies   Current Outpatient Medications   Medication Sig Dispense Refill     abiraterone (ZYTIGA) 250 MG tablet        apixaban ANTICOAGULANT (ELIQUIS ANTICOAGULANT) 5 MG tablet Take 1 tablet (5 mg) by mouth 2 times daily. 180 tablet 3     furosemide (LASIX) 20 MG tablet Take 1 tablet (20 mg) by mouth daily. 90 tablet 1     [START ON  6/20/2025] goserelin (ZOLADEX) 10.8 MG injection Inject 10.8 mg subcutaneously.       [START ON 12/5/2025] hydrocortisone sodium succinate PF (SOLU-CORTEF) 100 mg/2 mL injection Inject 100 mg into the vein.       [START ON 9/12/2025] hydrocortisone sodium succinate PF (SOLU-CORTEF) 100 mg/2 mL injection Inject 100 mg into the vein.       hydrocortisone sodium succinate PF (SOLU-CORTEF) 100 mg/2 mL injection Inject 100 mg into the vein.       lisinopril (ZESTRIL) 2.5 MG tablet Take 1 tablet (2.5 mg) by mouth daily. 90 tablet 1     methylprednisoLONE sodium succinate (SOLU-MEDROL) 2000 MG injection Inject 125 mg into the vein.       metoprolol succinate ER (TOPROL XL) 50 MG 24 hr tablet Take 0.5 tablets (25 mg) by mouth daily. 45 tablet 3     spironolactone (ALDACTONE) 25 MG tablet Take 1 tablet (25 mg) by mouth daily 90 tablet 3    No Known Allergies      Lab Results    Chemistry/lipid CBC Cardiac Enzymes/BNP/TSH/INR   Lab Results   Component Value Date    CHOL 152 01/03/2025    HDL 40 01/03/2025    TRIG 133 01/03/2025    BUN 17.2 01/03/2025     01/03/2025    CO2 24 01/03/2025    Lab Results   Component Value Date    WBC 6.1 10/28/2024    HGB 15.9 10/28/2024    HCT 49.0 10/28/2024    MCV 95 10/28/2024     10/28/2024    Lab Results   Component Value Date    TSH 1.49 01/03/2025        30 minutes spent on the date of encounter doing chart review, review of test results, interpretation with above tests, patient visit, and documentation.        This note has been dictated using voice recognition software. Any grammatical, typographical, or context distortions are unintentional and inherent to the software          Thank you for allowing me to participate in the care of your patient.      Sincerely,     DARLENE Vaughn CNP     [facility]  cc:   DARLENE Vaughn CNP  [address]  [place] [phone]

## 2025-05-07 NOTE — PROGRESS NOTES
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  NT proBNP is 742 on March 2024.    Echocardiogram in February 2025 showed LVEF improved to 55 to 60% with grade I early diastolic dysfunction.  Previous echo with LVEF of 45 to 50% per ELY in October 2024.  Cardiac stress MRI negative for inducible myocardial ischemia in August 2024.    Current home is 258 lbs. Weight is down another 5 pounds since last heart failure clinic visit.    Patient is well compensated on exam.  Dyspnea on exertion and fatigue improved since ablation.     Heart failure regimen includes:    -Beta-blocker therapy with metoprolol succinate 25 mg daily dose reduced due to dizziness    -ACE inhibitor with lisinopril 2.5 mg daily-dose reduced in the past due to symptomatic hypotension    -Aldosterone blocker therapy with spironolactone 25 mg daily    -Not on SGLT2 Inhibitor -not started due to dizziness with low blood pressure    -Diuretic therapy with furosemide 40 mg daily    Stable renal function in January 2025.    # Hypotension: Blood pressure in clinic today is 97/66.  He reports some improvement in his lightheadedness although he continues to have some lightheadedness and dizziness.  His blood pressure still running low despite cutting back on lisinopril.    #  Persistent Atrial fibrillation with status post PVI ablation in October 2024: He expressed concern about cost issue with Xarelto.  We briefly discussed about Watchman in the past and  patient expressed interest.  However he would like to try alternative anticoagulation therapy first.  He is currently taking leftover supply of Xarelto.  He plans to switch to Eliquis if cheaper otherwise he will call back his primary cardiology team  if he decides to remain on Xarelto.    #  Morbid obesity with BMI of 35.40: He continues to exercise  "regularly.    Plan/Recommendation:  -Reduce furosemide from 40 mg to 20 mg daily.  -Encouraged to call if persistent weight gain with worsening heart failure symptoms  - Continue on low salt diet <2000 mg per day, daily weight monitoring, and maintain adequate fluid intake with 50-60 ounces per day    Follow up with  Dr. Landrum in July 2025.   Follow up with me in 6 months or sooner if needed in heart failure clinic     The longitudinal plan of care for cardiomyopathy likely tachycardia mediated, heart failure with reduced ejection fraction was addressed during this visit.?Due to the added complexity in care, I will continue to support Ravin Swanson in the subsequent management of this condition(s) and with the ongoing continuity of care of this condition(s)\".     History of Present Illness/Subjective    Mr. Ravin Swanson is a 84 year old male with a past medical history of morbid obesity, COVID infection in November 2023, dyspnea on exertion, and tachycardia mediated cardiomyopathy, persistent atrial fibrillation and chronic systolic heart failure who is seen at [facility] for continued heart failure follow-up.    During last heart failure clinic visit, patient reported improved in his fatigue and dyspnea on exertion and activity tolerance since ablation.  Due to symptomatic hypotension, his lisinopril dose was reduced.    Today, Bill reports that he continues to do well from heart failure standpoint.  He continues to have some lightheadedness and dizziness at times although he reduced his lisinopril.  His blood pressure is still running on the low side.  His weight is down another 5 pounds since last heart failure clinic visit.   he reported an episode of feeling fatigued, weak and dizzy last Sunday when he was throwing his boat.  Apparently this was around lunchtime and he has not had his lunch or had much fluid for the day.  He denies recurrent episodes.  He denies shortness " of breath, orthopnea, PND, palpitations, chest pain, abdominal fullness/bloating, and lower extremity edema.     He reports following low-salt diet.  He reports adequate fluid intake.    He continues to go to gym and workout 5-6 times a week for about 45 minutes at a time.    Echo from 2/5/2024-reviewed  Interpretation Summary   The left ventricle is normal in size. There is mild concentric left  ventricular hypertrophy.  Left ventricular systolic function is normal. The visual ejection fraction is  55-60%. No regional wall motion abnormalities noted.  Grade I or early diastolic dysfunction.   The right ventricle is mildly dilated. Mildly decreased right ventricular  systolic function  Normal left atrial size. Borderline right atrial enlargement.  A bicuspid aortic valve cannot be excluded. No aortic regurgitation is  present. No aortic stenosis is present.  IVC diameter and respiratory changes fall into an intermediate range  suggesting an RA pressure of 8 mmHg.  Ascending Aorta dilatation is present measuring 4.3 cm.  When compared with previous study from 10/28/2024 the LVEF has improved.    ELY from October 2024-reviewed  Interpretation Summary   No thrombus is detected in the left atrial appendage.  A contrast injection (Bubble Study) was performed that was negative for flow  across the interatrial septum.  The left ventricle is normal in size.  The visual ejection fraction is 45-50%.  There is mild global hypokinesia of the left ventricle.  No significant valve disease  Mildly dilated ascending aorta, 41mm    ECHO from 1/8/24-Reviewed:   Interpretation Summary   The left ventricle is normal in size.  There is moderate concentric left ventricular hypertrophy.  The visual ejection fraction is 40-45%.  There is mild-moderate global hypokinesia of the left ventricle.  The right ventricle is mildly dilated.  Moderately decreased right ventricular systolic function  The left atrium is severely dilated.  The right  ventricular systolic pressure is approximated at 30mmHg plus the  right atrial pressure.  Ascending Aorta dilatation is present.  The rhythm was rapid atrial fibrillation.  There is no comparison study available.     Physical Examination Review of Systems   BP 97/66 (BP Location: Left arm, Patient Position: Sitting, Cuff Size: Adult Large)   Pulse 92   Wt 118.4 kg (261 lb)   SpO2 97%   BMI 35.40 kg/m    Body mass index is 35.4 kg/m .  Wt Readings from Last 3 Encounters:   05/07/25 118.4 kg (261 lb)   03/05/25 121.1 kg (266 lb 14.4 oz)   01/29/25 119.9 kg (264 lb 4 oz)     General Appearance:   no distress, normal body habitus   ENT/Mouth: membranes moist, no oral lesions or bleeding gums.      EYES:  no scleral icterus, normal conjunctivae   Neck: no  thyromegaly   Chest/Lungs:   lungs are clear to auscultation, no rales or wheezing, equal chest wall expansion    Cardiovascular:   Heart rate regular; Normal first and second heart sounds with no murmurs, rubs, or gallops;JVP is difficult to assess due to the patient's obesity and body habitus   , trace edema bilaterally    Abdomen:  Large but soft; organomegaly, masses, bruits, or tenderness; bowel sounds are present   Extremities   no cyanosis or clubbing; CMS intact   Skin: no xanthelasma, warm.    Neurologic: normal  bilateral, no tremors   Psychiatric: alert and oriented x3, calm                                                    Negative unless noted in HPI     Medical History  Surgical History Family History Social History   Past Medical History:   Diagnosis Date    Hypertension     Past Surgical History:   Procedure Laterality Date    COLONOSCOPY      COLONOSCOPY N/A 5/3/2024    Procedure: COLONOSCOPY with POLYPECTOMY;  Surgeon: Barry Hayes MD;  Location: [facility] Main OR    EP ABLATION PULMONARY VEIN ISOLATION N/A 10/28/2024    Procedure: Ablation Atrial Fibrillation;  Surgeon: Isak Alicia MD;  Location: [facility] CATH LAB CV     GENITOURINARY SURGERY      No family history on file. Social History     Socioeconomic History    Marital status:      Spouse name: Not on file    Number of children: Not on file    Years of education: Not on file    Highest education level: Not on file   Occupational History    Not on file   Tobacco Use    Smoking status: Former     Current packs/day: 0.00     Types: Cigarettes     Quit date:      Years since quittin.3    Smokeless tobacco: Never   Vaping Use    Vaping status: Never Used   Substance and Sexual Activity    Alcohol use: Yes     Comment: minimal    Drug use: Never    Sexual activity: Not on file   Other Topics Concern    Not on file   Social History Narrative    Not on file     Social Drivers of Health     Financial Resource Strain: Not on file   Food Insecurity: Not on file   Transportation Needs: Not on file   Physical Activity: Not on file   Stress: Not on file   Social Connections: Not on file   Interpersonal Safety: Low Risk  (10/28/2024)    Interpersonal Safety     Do you feel physically and emotionally safe where you currently live?: Yes     Within the past 12 months, have you been hit, slapped, kicked or otherwise physically hurt by someone?: No     Within the past 12 months, have you been humiliated or emotionally abused in other ways by your partner or ex-partner?: No   Housing Stability: Not on file          Medications  Allergies   Current Outpatient Medications   Medication Sig Dispense Refill    abiraterone (ZYTIGA) 250 MG tablet       apixaban ANTICOAGULANT (ELIQUIS ANTICOAGULANT) 5 MG tablet Take 1 tablet (5 mg) by mouth 2 times daily. 180 tablet 3    furosemide (LASIX) 20 MG tablet Take 1 tablet (20 mg) by mouth daily. 90 tablet 1    [START ON 2025] goserelin (ZOLADEX) 10.8 MG injection Inject 10.8 mg subcutaneously.      [START ON 2025] hydrocortisone sodium succinate PF (SOLU-CORTEF) 100 mg/2 mL injection Inject 100 mg into the vein.      [START ON  9/12/2025] hydrocortisone sodium succinate PF (SOLU-CORTEF) 100 mg/2 mL injection Inject 100 mg into the vein.      hydrocortisone sodium succinate PF (SOLU-CORTEF) 100 mg/2 mL injection Inject 100 mg into the vein.      lisinopril (ZESTRIL) 2.5 MG tablet Take 1 tablet (2.5 mg) by mouth daily. 90 tablet 1    methylprednisoLONE sodium succinate (SOLU-MEDROL) 2000 MG injection Inject 125 mg into the vein.      metoprolol succinate ER (TOPROL XL) 50 MG 24 hr tablet Take 0.5 tablets (25 mg) by mouth daily. 45 tablet 3    spironolactone (ALDACTONE) 25 MG tablet Take 1 tablet (25 mg) by mouth daily 90 tablet 3    No Known Allergies      Lab Results    Chemistry/lipid CBC Cardiac Enzymes/BNP/TSH/INR   Lab Results   Component Value Date    CHOL 152 01/03/2025    HDL 40 01/03/2025    TRIG 133 01/03/2025    BUN 17.2 01/03/2025     01/03/2025    CO2 24 01/03/2025    Lab Results   Component Value Date    WBC 6.1 10/28/2024    HGB 15.9 10/28/2024    HCT 49.0 10/28/2024    MCV 95 10/28/2024     10/28/2024    Lab Results   Component Value Date    TSH 1.49 01/03/2025        30 minutes spent on the date of encounter doing chart review, review of test results, interpretation with above tests, patient visit, and documentation.        This note has been dictated using voice recognition software. Any grammatical, typographical, or context distortions are unintentional and inherent to the software

## 2025-05-07 NOTE — PATIENT INSTRUCTIONS
Ravin Swanson,    It was a pleasure to see you today at the [facility].     My recommendations after this visit include:    - Reduce furosemide (lasix) from 40 mg to 20 mg daily.    - Low sodium diet < 2000 mg/day, daily weight monitoring, and maintain adequate fluid intake at 50 to 60 ounces per day    -Please call if you experience persistent weight gain 2 to 3 pounds 2 days in a row or 5 pounds in a week with shortness of breath, abdominal bloating and leg swelling or persistent worsening lightheaded or dizziness    - Follow up with Dr. Landrum in July    - Follow up with Shalom Chi CNP in 6 months    - Please call Heart Failure Nurse Line at [phone], if you have any questions or concerns

## 2025-06-02 ENCOUNTER — TELEPHONE (OUTPATIENT)
Dept: CARDIOLOGY | Facility: CLINIC | Age: 85
End: 2025-06-02
Payer: COMMERCIAL

## 2025-06-02 DIAGNOSIS — I48.19 PERSISTENT ATRIAL FIBRILLATION (H): Primary | ICD-10-CM

## 2025-06-02 NOTE — TELEPHONE ENCOUNTER
Signature obtained and faxed to pharmacy-    From: Hedy Landrum MD  Sent: 6/2/2025   4:56 PM CDT  To: Brianna Isaacs    Yes  ----- Message -----  From: Brianna Isaacs  Sent: 6/2/2025   2:10 PM CDT  To: Hedy Landrum MD    ----- Message from Brianna Isaacs sent at 6/2/2025  2:10 PM CDT -----    Hi Dr. Landrum,    Please see encounter, ok to return back to xarelto 20 mg? If so , I will come find you for a signature for this [facility] pharmacy. Thanks-brianna         Pt called in- has been using up his xarelto, has not started eliquis nor wishes too. He has found a cheaper pharmacy for his xarelto.     [facility]    ID number [id number]  Fax [phone]

## 2025-06-02 NOTE — TELEPHONE ENCOUNTER
Patient called reporting he has a question about Xarelto, he is currently prescribed Eliquis.  Patient's Eliquis is managed by Dr Landrum.  Please call the patient back to discuss.    Yohannes Corrales RN

## 2025-06-11 DIAGNOSIS — I50.22 CHRONIC SYSTOLIC CONGESTIVE HEART FAILURE (H): ICD-10-CM

## 2025-06-11 RX ORDER — SPIRONOLACTONE 25 MG/1
12.5 TABLET ORAL DAILY
Qty: 30 TABLET | Refills: 4 | Status: SHIPPED | OUTPATIENT
Start: 2025-06-11

## 2025-06-16 ENCOUNTER — TELEPHONE (OUTPATIENT)
Dept: CARDIOLOGY | Facility: CLINIC | Age: 85
End: 2025-06-16
Payer: COMMERCIAL

## 2025-06-16 DIAGNOSIS — I48.19 PERSISTENT ATRIAL FIBRILLATION (H): ICD-10-CM

## 2025-06-16 NOTE — TELEPHONE ENCOUNTER
[facility] Call Center    Phone Message    May a detailed message be left on voicemail: yes     Reason for Call: Medication Refill Request    Has the patient contacted the pharmacy for the refill? Yes   Name of medication being requested: rivaroxaban ANTICOAGULANT (XARELTO) 20 MG TABS tablet   Provider who prescribed the medication: Dr. Landrum  Pharmacy: [id number] [address] AT [facility] [address]    Date medication is needed: 1 week - patient has some coming via mail but it won't be delivered for 2 weeks.       Action Taken: Message routed to:  Other: Cardio    Travel Screening: Not Applicable     Date of Service:                          Thank you!  [facility]

## 2025-06-29 ENCOUNTER — HEALTH MAINTENANCE LETTER (OUTPATIENT)
Age: 85
End: 2025-06-29

## 2025-06-30 ENCOUNTER — TRANSFERRED RECORDS (OUTPATIENT)
Dept: HEALTH INFORMATION MANAGEMENT | Facility: CLINIC | Age: 85
End: 2025-06-30
Payer: COMMERCIAL

## 2025-07-29 ENCOUNTER — OFFICE VISIT (OUTPATIENT)
Dept: CARDIOLOGY | Facility: CLINIC | Age: 85
End: 2025-07-29
Payer: COMMERCIAL

## 2025-07-29 VITALS
WEIGHT: 262 LBS | OXYGEN SATURATION: 98 % | HEIGHT: 72 IN | BODY MASS INDEX: 35.49 KG/M2 | SYSTOLIC BLOOD PRESSURE: 101 MMHG | HEART RATE: 83 BPM | RESPIRATION RATE: 16 BRPM | DIASTOLIC BLOOD PRESSURE: 69 MMHG

## 2025-07-29 DIAGNOSIS — I50.21 ACUTE SYSTOLIC CONGESTIVE HEART FAILURE (H): Primary | ICD-10-CM

## 2025-07-29 DIAGNOSIS — I48.19 PERSISTENT ATRIAL FIBRILLATION (H): ICD-10-CM

## 2025-07-29 DIAGNOSIS — I42.8 TACHYCARDIA INDUCED CARDIOMYOPATHY (H): ICD-10-CM

## 2025-07-29 DIAGNOSIS — E66.01 MORBID OBESITY (H): ICD-10-CM

## 2025-07-29 RX ORDER — PREDNISONE 5 MG/1
5 TABLET ORAL DAILY
COMMUNITY

## 2025-07-29 NOTE — LETTER
7/29/2025    Ravin Dockery MD  [facility] [address] [place] [address]    RE: Ravin WICK Sky       Dear Colleague,     I had the pleasure of seeing Ravin Swanson in the [facility].      Click to link to [facility] HEART CARE NOTE       Assessment/Plan:   Persistent atrial fibrillation s/p ablation in October 2024: The patient feels much better after ablation.  Less shortness of breath on exertion and fatigue.  He is in sinus rhythm.  His pulse is well-controlled with metoprolol XL 25 mg daily.  He has no palpitations.  Continue metoprolol XL 50 mg daily. His [id number] score 3 due to age, CHF.  Continue Xarelto 20 mg at suppertime for prevention of stroke.  He noticed blood in his stool a few days ago, no recurrent episode.  He bought Xarelto from [facility] pharmacy, made in [place].  I told him that I do not have confidence for that medication without FDA monitoring.  Due to blood in stools, discussed Watchman device as alternative option for the prevention of stroke.  The patient agreed with the plan.  The patient is a referred to Watchman device team.  Cardiomyopathy, LVEF improved to normal range, chronic congestive heart failure with preserved ejection fraction: The patient is compensated at this time.  No signs of fluid retention.  Continue metoprolol succinate 25 mg daily, lisinopril 2.5 mg daily, spironolactone 12.5 mg daily, furosemide 20 mg daily.  Routine laboratory test CBC and BMP today.  Morbid obesity: Continue lifestyle modification.  Referred the patient to sleep medicine for evaluation of obstructive sleep apnea.    Thank you for the opportunity to be involved in the care of Ravin Swanson. If you have any questions, please feel free to contact me.  I will see the patient again in 12 months and as needed    Much or all of the text in this note was generated through the use of Dragon Dictate voice-to-text software. Errors in  spelling or words which seem out of context are unintentional.   Sound alike errors, in particular, may have escaped editing.       History of Present Illness:   It is my pleasure to see Ravin Swanson at the [facility] for routine cardiology follow-up. Ravin Swanson is a 85 year old male with a medical history of morbid obesity, history of prostate cancer status post surgical treatment, persistent atrial fibrillation s/p ablation on October 4, 2024, tachycardia induced cardiomyopathy, chronic congestive heart failure with preserved ejection fraction.    The patient states that he feels much better after he had ablation in October 2024.  His shortness of breath on exertion is much improved.  Less fatigue. He denies chest pain, palpitations, dizziness, orthopnea, PND.  He has no leg edema.  His weight has been stable.  His blood pressure and heart rate are controlled.  He did mention that he noticed of 1 times of blood in stools a few days ago.  No recurrent incidence.  Currently he is on Xarelto 20 mg at suppertime.    Past Medical History:     Patient Active Problem List   Diagnosis     Herpes Zoster (Shingles)     Morbid obesity (H)     Tachycardia induced cardiomyopathy (H)     Persistent atrial fibrillation (H)     Possible WALTER (obstructive sleep apnea)     Primary malignant neoplasm of prostate (H)     Male urinary stress incontinence     History of radiation therapy     Disappearance and death of family member     Abnormal prostate specific antigen (PSA)     Status post ablation of atrial fibrillation     HFrEF (heart failure with reduced ejection fraction) (H)     Hypotension due to drugs       Past Surgical History:     Past Surgical History:   Procedure Laterality Date     COLONOSCOPY       COLONOSCOPY N/A 5/3/2024    Procedure: COLONOSCOPY with POLYPECTOMY;  Surgeon: Barry Hayes MD;  Location: [facility] Main OR     EP ABLATION PULMONARY VEIN ISOLATION N/A 10/28/2024     Procedure: Ablation Atrial Fibrillation;  Surgeon: Isak Alicia MD;  Location: [facility] CV     GENITOURINARY SURGERY         Family History:   Reviewed: His brother has some kind of heart disease.    Social History:    reports that he quit smoking about 39 years ago. His smoking use included cigarettes. He has never used smokeless tobacco. He reports current alcohol use. He reports that he does not use drugs.    Review of Systems:   12 systems are reviewed negative except for in HPI.    Meds:     Current Outpatient Medications:      abiraterone (ZYTIGA) 250 MG tablet, , Disp: , Rfl:      furosemide (LASIX) 20 MG tablet, Take 1 tablet (20 mg) by mouth daily., Disp: 90 tablet, Rfl: 1     goserelin (ZOLADEX) 10.8 MG injection, Inject 10.8 mg subcutaneously., Disp: , Rfl:      [START ON 9/12/2025] hydrocortisone sodium succinate PF (SOLU-CORTEF) 100 mg/2 mL injection, Inject 100 mg into the vein., Disp: , Rfl:      hydrocortisone sodium succinate PF (SOLU-CORTEF) 100 mg/2 mL injection, Inject 100 mg into the vein., Disp: , Rfl:      lisinopril (ZESTRIL) 2.5 MG tablet, Take 1 tablet (2.5 mg) by mouth daily., Disp: 90 tablet, Rfl: 1     methylprednisoLONE sodium succinate (SOLU-MEDROL) 2000 MG injection, Inject 125 mg into the vein., Disp: , Rfl:      metoprolol succinate ER (TOPROL XL) 50 MG 24 hr tablet, Take 0.5 tablets (25 mg) by mouth daily., Disp: 45 tablet, Rfl: 3     predniSONE (DELTASONE) 5 MG tablet, Take 5 mg by mouth daily., Disp: , Rfl:      rivaroxaban ANTICOAGULANT (XARELTO) 20 MG TABS tablet, Take 1 tablet (20 mg) by mouth daily (with dinner)., Disp: 14 tablet, Rfl: 0     spironolactone (ALDACTONE) 25 MG tablet, TAKE 1/2 TABLET(12.5 MG) BY MOUTH DAILY, Disp: 30 tablet, Rfl: 4     [START ON 12/5/2025] hydrocortisone sodium succinate PF (SOLU-CORTEF) 100 mg/2 mL injection, Inject 100 mg into the vein. (Patient not taking: Reported on 7/29/2025), Disp: , Rfl:      Allergies:   Patient  has no known allergies.    Objective:      Physical Exam  118.8 kg (262 lb)  1.829 m (6')  Body mass index is 35.53 kg/m .  /69 (BP Location: Right arm, Patient Position: Sitting, Cuff Size: Adult Large)   Pulse 83   Resp 16   Ht 1.829 m (6')   Wt 118.8 kg (262 lb)   SpO2 98%   BMI 35.53 kg/m      General Appearance:   Awake, Alert, No acute distress.   HEENT:  Pupil equal, reactive to light. No scleral icterus; the mucous membranes were moist. No oral ulcers or thrush.    Neck: No cervical bruits. No JVD. No thyromegaly. No lymph node enlargement or tenderness.   Chest: The spine was straight. The chest was symmetric.   Lungs:   Respirations unlabored. Lungs are clear to auscultation. No crackles. No wheezing.   Cardiovascular:   RRR, normal rate, normal first and second heart sounds with no murmurs. No rubs or gallops.    Abdomen:  Obese. Soft. No tenderness. Non-distended. Bowels sounds are present   Extremities: Equal posterior tibial pulses.  No leg edema.   Skin: No rashes or ulcers. Warm, Dry.   Musculoskeletal: No tenderness. No deformity.   Neurologic: Mood and affect are appropriate. No focal deficits.         EKG:  Personally reivewed  Atrial fibrillation with RVR  No previous ECG for comparison    Cardiac Imaging Studies  ECHO on February 4, 2025:  The left ventricle is normal in size. There is mild concentric left  ventricular hypertrophy.  Left ventricular systolic function is normal. The visual ejection fraction is  55-60%. No regional wall motion abnormalities noted.  Grade I or early diastolic dysfunction.   The right ventricle is mildly dilated. Mildly decreased right ventricular  systolic function  Normal left atrial size. Borderline right atrial enlargement.  A bicuspid aortic valve cannot be excluded. No aortic regurgitation is  present. No aortic stenosis is present.  IVC diameter and respiratory changes fall into an intermediate range  suggesting an RA pressure of 8  "mmHg.  Ascending Aorta dilatation is present measuring 4.3 cm.  When compared with previous study from 10/28/2024 the LVEF has improved.    Stress cardiac MRI on August 20, 2024:  1.  Pharmacological Regadenoson stress cardiac MRI is negative for inducible myocardial ischemia.   2.  Pharmacological stress ECG is negative for inducible myocardial ischemia. Atrial fib with controlled heart rated noted during stress testing.    3. Normal left ventricular size and moderately reduced global systolic function. The quantified left ventricular ejection fraction is 42.7%.  Small area of endomyocardial fibrosis involving the mid to distal inferolateral wall segments.  No consistent with non-coronary pattern such as prior myocarditis.   Pre-contrast T1: 993 ms (normal).  4.  Mildly enlarged right ventricular size with moderate to severe reduction in right ventricular systolic function.  RVEF: 31.8%.    5.  No significant valvular abnormalities.  6.  Severe right and left atrial enlargement.  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   7.  Mild to moderate ascending aortic enlargement, 43x44 mm (non-contrast enhanced imaging).    Lab Review   Lab Results   Component Value Date     01/03/2024    CO2 27 01/03/2024    BUN 13.6 01/03/2024     No results found for: \"WBC\", \"HGB\", \"HCT\", \"MCV\", \"PLT\"  Lab Results   Component Value Date    CHOL 124 01/03/2024    TRIG 66 01/03/2024    HDL 44 01/03/2024    LDL 67 01/03/2024     LDL Cholesterol Calculated   Date Value Ref Range Status   01/03/2025 85 <100 mg/dL Final                 Thank you for allowing me to participate in the care of your patient.      Sincerely,     Hedy Landrum MD     [facility]  cc:   Luz Maria Stiles PA-C  [address]  [address]  [place] [address]      "

## 2025-07-29 NOTE — PROGRESS NOTES
Click to link to [facility] HEART CARE NOTE       Assessment/Plan:   Persistent atrial fibrillation s/p ablation in October 2024: The patient feels much better after ablation.  Less shortness of breath on exertion and fatigue.  He is in sinus rhythm.  His pulse is well-controlled with metoprolol XL 25 mg daily.  He has no palpitations.  Continue metoprolol XL 50 mg daily. His [id number] score 3 due to age, CHF.  Continue Xarelto 20 mg at suppertime for prevention of stroke.  He noticed blood in his stool a few days ago, no recurrent episode.  He bought Xarelto from [employer], made in [place].  I told him that I do not have confidence for that medication without FDA monitoring.  Due to blood in stools, discussed Watchman device as alternative option for the prevention of stroke.  The patient agreed with the plan.  The patient is a referred to Watchman device team.  Cardiomyopathy, LVEF improved to normal range, chronic congestive heart failure with preserved ejection fraction: The patient is compensated at this time.  No signs of fluid retention.  Continue metoprolol succinate 25 mg daily, lisinopril 2.5 mg daily, spironolactone 12.5 mg daily, furosemide 20 mg daily.  Routine laboratory test CBC and BMP today.  Morbid obesity: Continue lifestyle modification.  Referred the patient to sleep medicine for evaluation of obstructive sleep apnea.    Thank you for the opportunity to be involved in the care of Ravin Swanson. If you have any questions, please feel free to contact me.  I will see the patient again in 12 months and as needed    Much or all of the text in this note was generated through the use of Dragon Dictate voice-to-text software. Errors in spelling or words which seem out of context are unintentional.   Sound alike errors, in particular, may have escaped editing.       History of Present Illness:   It is my pleasure to see Ravin Swanson at the [facility]  Heart Care clinic for routine cardiology follow-up. Ravin Swanson is a 85 year old male with a medical history of morbid obesity, history of prostate cancer status post surgical treatment, persistent atrial fibrillation s/p ablation on October 4, 2024, tachycardia induced cardiomyopathy, chronic congestive heart failure with preserved ejection fraction.    The patient states that he feels much better after he had ablation in October 2024.  His shortness of breath on exertion is much improved.  Less fatigue. He denies chest pain, palpitations, dizziness, orthopnea, PND.  He has no leg edema.  His weight has been stable.  His blood pressure and heart rate are controlled.  He did mention that he noticed of 1 times of blood in stools a few days ago.  No recurrent incidence.  Currently he is on Xarelto 20 mg at suppertime.    Past Medical History:     Patient Active Problem List   Diagnosis    Herpes Zoster (Shingles)    Morbid obesity (H)    Tachycardia induced cardiomyopathy (H)    Persistent atrial fibrillation (H)    Possible WALTER (obstructive sleep apnea)    Primary malignant neoplasm of prostate (H)    Male urinary stress incontinence    History of radiation therapy    Disappearance and death of family member    Abnormal prostate specific antigen (PSA)    Status post ablation of atrial fibrillation    HFrEF (heart failure with reduced ejection fraction) (H)    Hypotension due to drugs       Past Surgical History:     Past Surgical History:   Procedure Laterality Date    COLONOSCOPY      COLONOSCOPY N/A 5/3/2024    Procedure: COLONOSCOPY with POLYPECTOMY;  Surgeon: Barry Hayes MD;  Location: [facility] Main OR    EP ABLATION PULMONARY VEIN ISOLATION N/A 10/28/2024    Procedure: Ablation Atrial Fibrillation;  Surgeon: Isak Alicia MD;  Location: [facility]    GENITOURINARY SURGERY         Family History:   Reviewed: His brother has some kind of heart disease.    Social History:     reports that he quit smoking about 39 years ago. His smoking use included cigarettes. He has never used smokeless tobacco. He reports current alcohol use. He reports that he does not use drugs.    Review of Systems:   12 systems are reviewed negative except for in HPI.    Meds:     Current Outpatient Medications:     abiraterone (ZYTIGA) 250 MG tablet, , Disp: , Rfl:     furosemide (LASIX) 20 MG tablet, Take 1 tablet (20 mg) by mouth daily., Disp: 90 tablet, Rfl: 1    goserelin (ZOLADEX) 10.8 MG injection, Inject 10.8 mg subcutaneously., Disp: , Rfl:     [START ON 9/12/2025] hydrocortisone sodium succinate PF (SOLU-CORTEF) 100 mg/2 mL injection, Inject 100 mg into the vein., Disp: , Rfl:     hydrocortisone sodium succinate PF (SOLU-CORTEF) 100 mg/2 mL injection, Inject 100 mg into the vein., Disp: , Rfl:     lisinopril (ZESTRIL) 2.5 MG tablet, Take 1 tablet (2.5 mg) by mouth daily., Disp: 90 tablet, Rfl: 1    methylprednisoLONE sodium succinate (SOLU-MEDROL) 2000 MG injection, Inject 125 mg into the vein., Disp: , Rfl:     metoprolol succinate ER (TOPROL XL) 50 MG 24 hr tablet, Take 0.5 tablets (25 mg) by mouth daily., Disp: 45 tablet, Rfl: 3    predniSONE (DELTASONE) 5 MG tablet, Take 5 mg by mouth daily., Disp: , Rfl:     rivaroxaban ANTICOAGULANT (XARELTO) 20 MG TABS tablet, Take 1 tablet (20 mg) by mouth daily (with dinner)., Disp: 14 tablet, Rfl: 0    spironolactone (ALDACTONE) 25 MG tablet, TAKE 1/2 TABLET(12.5 MG) BY MOUTH DAILY, Disp: 30 tablet, Rfl: 4    [START ON 12/5/2025] hydrocortisone sodium succinate PF (SOLU-CORTEF) 100 mg/2 mL injection, Inject 100 mg into the vein. (Patient not taking: Reported on 7/29/2025), Disp: , Rfl:      Allergies:   Patient has no known allergies.    Objective:      Physical Exam  118.8 kg (262 lb)  1.829 m (6')  Body mass index is 35.53 kg/m .  /69 (BP Location: Right arm, Patient Position: Sitting, Cuff Size: Adult Large)   Pulse 83   Resp 16   Ht 1.829 m (6')    Wt 118.8 kg (262 lb)   SpO2 98%   BMI 35.53 kg/m      General Appearance:   Awake, Alert, No acute distress.   HEENT:  Pupil equal, reactive to light. No scleral icterus; the mucous membranes were moist. No oral ulcers or thrush.    Neck: No cervical bruits. No JVD. No thyromegaly. No lymph node enlargement or tenderness.   Chest: The spine was straight. The chest was symmetric.   Lungs:   Respirations unlabored. Lungs are clear to auscultation. No crackles. No wheezing.   Cardiovascular:   RRR, normal rate, normal first and second heart sounds with no murmurs. No rubs or gallops.    Abdomen:  Obese. Soft. No tenderness. Non-distended. Bowels sounds are present   Extremities: Equal posterior tibial pulses.  No leg edema.   Skin: No rashes or ulcers. Warm, Dry.   Musculoskeletal: No tenderness. No deformity.   Neurologic: Mood and affect are appropriate. No focal deficits.         EKG:  Personally reivewed  Atrial fibrillation with RVR  No previous ECG for comparison    Cardiac Imaging Studies  ECHO on February 4, 2025:  The left ventricle is normal in size. There is mild concentric left  ventricular hypertrophy.  Left ventricular systolic function is normal. The visual ejection fraction is  55-60%. No regional wall motion abnormalities noted.  Grade I or early diastolic dysfunction.   The right ventricle is mildly dilated. Mildly decreased right ventricular  systolic function  Normal left atrial size. Borderline right atrial enlargement.  A bicuspid aortic valve cannot be excluded. No aortic regurgitation is  present. No aortic stenosis is present.  IVC diameter and respiratory changes fall into an intermediate range  suggesting an RA pressure of 8 mmHg.  Ascending Aorta dilatation is present measuring 4.3 cm.  When compared with previous study from 10/28/2024 the LVEF has improved.    Stress cardiac MRI on August 20, 2024:  1.  Pharmacological Regadenoson stress cardiac MRI is negative for inducible myocardial  "ischemia.   2.  Pharmacological stress ECG is negative for inducible myocardial ischemia. Atrial fib with controlled heart rated noted during stress testing.    3. Normal left ventricular size and moderately reduced global systolic function. The quantified left ventricular ejection fraction is 42.7%.  Small area of endomyocardial fibrosis involving the mid to distal inferolateral wall segments.  No consistent with non-coronary pattern such as prior myocarditis.   Pre-contrast T1: 993 ms (normal).  4.  Mildly enlarged right ventricular size with moderate to severe reduction in right ventricular systolic function.  RVEF: 31.8%.    5.  No significant valvular abnormalities.  6.  Severe right and left atrial enlargement.  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   7.  Mild to moderate ascending aortic enlargement, 43x44 mm (non-contrast enhanced imaging).    Lab Review   Lab Results   Component Value Date     01/03/2024    CO2 27 01/03/2024    BUN 13.6 01/03/2024     No results found for: \"WBC\", \"HGB\", \"HCT\", \"MCV\", \"PLT\"  Lab Results   Component Value Date    CHOL 124 01/03/2024    TRIG 66 01/03/2024    HDL 44 01/03/2024    LDL 67 01/03/2024     LDL Cholesterol Calculated   Date Value Ref Range Status   01/03/2025 85 <100 mg/dL Final               "

## (undated) DEVICE — CATHETER OCTARAY LONG SPLINE CURVE F 3-3-3-3-3 D160906

## (undated) DEVICE — TRANSDUCER TRAY ARTERIAL 42646-06

## (undated) DEVICE — CATH EP 7FR X 115CM DECANAV CA

## (undated) DEVICE — TUBING SUCTION MEDI-VAC 1/4"X20' N620A

## (undated) DEVICE — INTRODUCER SHEATH VASC CATH 8.5FR CARTO GIDE STH MED D138502

## (undated) DEVICE — CATH TRANSSEPTAL VERSACROSS 45D 63X230CM VXSK0032

## (undated) DEVICE — ENDO SNARE EXACTO COLD 9MM LOOP 2.4MMX230CM 00711115

## (undated) DEVICE — SUCTION MANIFOLD NEPTUNE 2 SYS 1 PORT 702-025-000

## (undated) DEVICE — CUSTOM PACK EP

## (undated) DEVICE — INTRO SHEATH 9FRX10CM PINNACLE RSS902

## (undated) DEVICE — ELECTRODE DEFIB CADENCE 22550R

## (undated) DEVICE — PATCH CARTO 3 EXTERNAL REFERENCE 3D MAPPING CREFP6

## (undated) DEVICE — PROBE TEMP CIRCA S-CATH M ESPH 12-SNSR 3D MAP CS-21EP

## (undated) DEVICE — INTRO SHEATH 8FRX10CM PINNACLE RSS802

## (undated) DEVICE — SOL WATER IRRIG 1000ML BOTTLE 2F7114

## (undated) DEVICE — CATH ABLATION 8FR 115CM F-J CURVE BI-DIR QDOT MICRO D139504

## (undated) DEVICE — TUBE SET SMARKABLATE IRRIGATION

## (undated) DEVICE — 8F SOUNDSTAR ECO ULTRASOUND CATHETER

## (undated) RX ORDER — FENTANYL CITRATE-0.9 % NACL/PF 10 MCG/ML
PLASTIC BAG, INJECTION (ML) INTRAVENOUS
Status: DISPENSED
Start: 2024-10-28

## (undated) RX ORDER — FENTANYL CITRATE 50 UG/ML
INJECTION, SOLUTION INTRAMUSCULAR; INTRAVENOUS
Status: DISPENSED
Start: 2024-10-28